# Patient Record
Sex: FEMALE | Race: WHITE | NOT HISPANIC OR LATINO | Employment: UNEMPLOYED | URBAN - METROPOLITAN AREA
[De-identification: names, ages, dates, MRNs, and addresses within clinical notes are randomized per-mention and may not be internally consistent; named-entity substitution may affect disease eponyms.]

---

## 2018-08-09 ENCOUNTER — OFFICE VISIT (OUTPATIENT)
Dept: INTERNAL MEDICINE | Facility: CLINIC | Age: 28
End: 2018-08-09
Payer: COMMERCIAL

## 2018-08-09 VITALS
BODY MASS INDEX: 47.09 KG/M2 | WEIGHT: 293 LBS | HEART RATE: 112 BPM | HEIGHT: 66 IN | SYSTOLIC BLOOD PRESSURE: 110 MMHG | TEMPERATURE: 98.8 F | DIASTOLIC BLOOD PRESSURE: 80 MMHG | RESPIRATION RATE: 16 BRPM

## 2018-08-09 DIAGNOSIS — Z12.4 CERVICAL CANCER SCREENING: ICD-10-CM

## 2018-08-09 DIAGNOSIS — N92.6 IRREGULAR PERIODS: ICD-10-CM

## 2018-08-09 DIAGNOSIS — Z11.3 SCREEN FOR STD (SEXUALLY TRANSMITTED DISEASE): ICD-10-CM

## 2018-08-09 DIAGNOSIS — E66.01 MORBID OBESITY (H): ICD-10-CM

## 2018-08-09 DIAGNOSIS — Z13.6 CARDIOVASCULAR SCREENING; LDL GOAL LESS THAN 160: ICD-10-CM

## 2018-08-09 DIAGNOSIS — Z00.00 ROUTINE GENERAL MEDICAL EXAMINATION AT A HEALTH CARE FACILITY: Primary | ICD-10-CM

## 2018-08-09 LAB — HCG SERPL QL: NEGATIVE

## 2018-08-09 PROCEDURE — 80053 COMPREHEN METABOLIC PANEL: CPT | Performed by: PHYSICIAN ASSISTANT

## 2018-08-09 PROCEDURE — 99213 OFFICE O/P EST LOW 20 MIN: CPT | Mod: 25 | Performed by: PHYSICIAN ASSISTANT

## 2018-08-09 PROCEDURE — 84439 ASSAY OF FREE THYROXINE: CPT | Performed by: PHYSICIAN ASSISTANT

## 2018-08-09 PROCEDURE — 84443 ASSAY THYROID STIM HORMONE: CPT | Performed by: PHYSICIAN ASSISTANT

## 2018-08-09 PROCEDURE — 87591 N.GONORRHOEAE DNA AMP PROB: CPT | Performed by: PHYSICIAN ASSISTANT

## 2018-08-09 PROCEDURE — 80061 LIPID PANEL: CPT | Performed by: PHYSICIAN ASSISTANT

## 2018-08-09 PROCEDURE — G0145 SCR C/V CYTO,THINLAYER,RESCR: HCPCS | Performed by: PHYSICIAN ASSISTANT

## 2018-08-09 PROCEDURE — 84703 CHORIONIC GONADOTROPIN ASSAY: CPT | Performed by: PHYSICIAN ASSISTANT

## 2018-08-09 PROCEDURE — 87491 CHLMYD TRACH DNA AMP PROBE: CPT | Performed by: PHYSICIAN ASSISTANT

## 2018-08-09 PROCEDURE — 99385 PREV VISIT NEW AGE 18-39: CPT | Performed by: PHYSICIAN ASSISTANT

## 2018-08-09 PROCEDURE — G0476 HPV COMBO ASSAY CA SCREEN: HCPCS | Performed by: PHYSICIAN ASSISTANT

## 2018-08-09 PROCEDURE — 36415 COLL VENOUS BLD VENIPUNCTURE: CPT | Performed by: PHYSICIAN ASSISTANT

## 2018-08-09 RX ORDER — NORETHINDRONE ACETATE AND ETHINYL ESTRADIOL .02; 1 MG/1; MG/1
1 TABLET ORAL DAILY
Qty: 28 TABLET | Refills: 11 | Status: SHIPPED | OUTPATIENT
Start: 2018-08-09 | End: 2018-10-31

## 2018-08-09 NOTE — PROGRESS NOTES
SUBJECTIVE:   CC: Siddharth Fontanez is an 28 year old woman who presents for preventive health visit.     Physical   Annual:     Getting at least 3 servings of Calcium per day:  Yes    Bi-annual eye exam:  NO    Dental care twice a year:  NO    Sleep apnea or symptoms of sleep apnea:  None    Diet:  Regular (no restrictions), Low salt and Low fat/cholesterol    Frequency of exercise:  2-3 days/week    Duration of exercise:  30-45 minutes    Taking medications regularly:  Yes    Medication side effects:  None    Additional concerns today:  No    Patient is here for a physical and fasting lab work today. She has a longstanding history of irregular periods and would like to ensure that she is not pregnant and to discuss possible birth control to regulate her periods. She would also like STD screening and other general fasting lab work today.    -------------------------------------    Today's PHQ-2 Score:   PHQ-2 ( 1999 Pf[employer]) 8/9/2018   Q1: Little interest or pleasure in doing things 1   Q2: Feeling down, depressed or hopeless 0   PHQ-2 Score 1   Q1: Little interest or pleasure in doing things Several days   Q2: Feeling down, depressed or hopeless Not at all   PHQ-2 Score 1       Abuse: Current or Past(Physical, Sexual or Emotional)- No  Do you feel safe in your environment - Yes    Social History   Substance Use Topics     Smoking status: Never Smoker     Smokeless tobacco: Never Used     Alcohol use No     Alcohol Use 8/9/2018   If you drink alcohol do you typically have greater than 3 drinks per day OR greater than 7 drinks per week? No       Reviewed orders with patient.  Reviewed health maintenance and updated orders accordingly - Yes  BP Readings from Last 3 Encounters:   08/09/18 110/80   06/18/14 100/80   02/11/14 100/80    Wt Readings from Last 3 Encounters:   08/09/18 313 lb (142 kg)   06/18/14 300 lb (136.1 kg)   02/11/14 300 lb (136.1 kg)         Mammogram not appropriate for this patient based on  "age.    Pertinent mammograms are reviewed under the imaging tab.  History of abnormal Pap smear: NO - age 30- 65 PAP every 3 years recommended  PAP / HPV 9/23/2013   PAP NIL     Reviewed and updated as needed this visit by clinical staff  Tobacco  Allergies         Reviewed and updated as needed this visit by Provider            Review of Systems  CONSTITUTIONAL: NEGATIVE for fever, chills, change in weight  INTEGUMENTARU/SKIN: NEGATIVE for worrisome rashes, moles or lesions  EYES: NEGATIVE for vision changes or irritation  ENT: NEGATIVE for ear, mouth and throat problems  RESP: NEGATIVE for significant cough or SOB  BREAST: NEGATIVE for masses, tenderness or discharge  CV: NEGATIVE for chest pain, palpitations or peripheral edema  GI: NEGATIVE for nausea, abdominal pain, heartburn, or change in bowel habits   female: irregular periods  MUSCULOSKELETAL: NEGATIVE for significant arthralgias or myalgia  NEURO: NEGATIVE for weakness, dizziness or paresthesias  ENDOCRINE: NEGATIVE for temperature intolerance, skin/hair changes  PSYCHIATRIC: NEGATIVE for changes in mood or affect     OBJECTIVE:   /80  Pulse 112  Temp 98.8  F (37.1  C) (Oral)  Resp 16  Ht 5' 5.75\" (1.67 m)  Wt 313 lb (142 kg)  LMP 07/26/2018  BMI 50.9 kg/m2  Physical Exam  GENERAL: alert and obese  EYES: Eyes grossly normal to inspection, PERRL and conjunctivae and sclerae normal  HENT: ear canals and TM's normal, nose and mouth without ulcers or lesions  NECK: no adenopathy, no asymmetry, masses, or scars and thyroid normal to palpation  RESP: lungs clear to auscultation - no rales, rhonchi or wheezes  CV: regular rate and rhythm, normal S1 S2, no S3 or S4, no murmur, click or rub, no peripheral edema and peripheral pulses strong  ABDOMEN: soft, nontender, no hepatosplenomegaly, no masses and bowel sounds normal   (female): normal female external genitalia, normal urethral meatus, vaginal mucosa pink, moist, well rugated, and normal " cervix/adnexa/uterus without masses or discharge  MS: no gross musculoskeletal defects noted, no edema  SKIN: no suspicious lesions or rashes  NEURO: Normal strength and tone, mentation intact and speech normal  PSYCH: mentation appears normal, affect normal/bright    Diagnostic Test Results:  No results found for this or any previous visit (from the past 24 hour(s)).  Patient will get fasting lab work as well as PAP and STD screening.    ASSESSMENT/PLAN:       ICD-10-CM    1. Routine general medical examination at a health care facility Z00.00 Lipid panel reflex to direct LDL Fasting     Comprehensive metabolic panel   2. Morbid obesity (H) E66.01 Lipid panel reflex to direct LDL Fasting     Comprehensive metabolic panel     TSH with free T4 reflex   3. CARDIOVASCULAR SCREENING; LDL GOAL LESS THAN 160 Z13.6 Lipid panel reflex to direct LDL Fasting   4. Irregular periods N92.6 HCG Qual, Blood ([id number])     TSH with free T4 reflex     norethindrone-ethinyl estradiol (MICROGESTIN 1/20) 1-20 MG-MCG per tablet   5. Cervical cancer screening Z12.4 Pap imaged thin layer screen with HPV - recommended age 30 - 65 years (select HPV order below)     HPV High Risk Types DNA Cervical   6. Screen for STD (sexually transmitted disease) Z11.3 NEISSERIA GONORRHOEA PCR     CHLAMYDIA TRACHOMATIS PCR     Patient does not have a PCP here at [facility] and reports that she is also frequently seen at [facility] but does not have a PCP there either.   I will get fasting blood work today and follow up with results. I discussed birth control and irregular periods with patient and will have her start an OCP and follow up if breakthrough bleeding or other concerns.   I discussed diet and exercise changes and she will be making lifestyle changes and was encouraged to follow up and establish care with a PCP in 3-6 months.       COUNSELING:  Reviewed preventive health counseling, as reflected in patient instructions       Regular exercise        "Healthy diet/nutrition       Contraception       Safe sex practices/STD prevention    BP Readings from Last 1 Encounters:   08/09/18 110/80     Estimated body mass index is 50.9 kg/(m^2) as calculated from the following:    Height as of this encounter: 5' 5.75\" (1.67 m).    Weight as of this encounter: 313 lb (142 kg).      Weight management plan: Discussed healthy diet and exercise guidelines and patient will follow up in 3 months in clinic to re-evaluate.     reports that she has never smoked. She has never used smokeless tobacco.      Counseling Resources:  ATP IV Guidelines  Pooled Cohorts Equation Calculator  Breast Cancer Risk Calculator  FRAX Risk Assessment  ICSI Preventive Guidelines  Dietary Guidelines for Americans, 2010  USDA's MyPlate  ASA Prophylaxis  Lung CA Screening    Grisel Palacios PA-C  [facility]  Answers for HPI/ROS submitted by the patient on 8/9/2018   PHQ-2 Score: 1    "

## 2018-08-09 NOTE — LETTER
August 10, 2018      Siddharth Fontanez  [address] [address]        Dear ,    We are writing to inform you of your test results.    Siddharth lab work has returned and shows normal thyroid function, cholesterol levels are at goal with exception of HDL(good cholesterol) which can be improved with increased cardiovascular exercise, Kidney function, liver function, blood sugar and electrolytes are within normal range with exception of a mildly low calcium level. STD screening tests are negative.    Resulted Orders   Lipid panel reflex to direct LDL Fasting   Result Value Ref Range    Cholesterol 140 <200 mg/dL    Triglycerides 78 <150 mg/dL    HDL Cholesterol 45 (L) >49 mg/dL    LDL Cholesterol Calculated 79 <100 mg/dL      Comment:      Desirable:       <100 mg/dl    Non HDL Cholesterol 95 <130 mg/dL   Comprehensive metabolic panel   Result Value Ref Range    Sodium 139 133 - 144 mmol/L    Potassium 3.9 3.4 - 5.3 mmol/L    Chloride 107 94 - 109 mmol/L    Carbon Dioxide 22 20 - 32 mmol/L    Anion Gap 10 3 - 14 mmol/L    Glucose 91 70 - 99 mg/dL    Urea Nitrogen 11 7 - 30 mg/dL    Creatinine 0.74 0.52 - 1.04 mg/dL    GFR Estimate >90 >60 mL/min/1.7m2      Comment:      Non  GFR Calc    GFR Estimate If Black >90 >60 mL/min/1.7m2      Comment:       GFR Calc    Calcium 7.9 (L) 8.5 - 10.1 mg/dL    Bilirubin Total 0.3 0.2 - 1.3 mg/dL    Albumin 3.2 (L) 3.4 - 5.0 g/dL    Protein Total 8.0 6.8 - 8.8 g/dL    Alkaline Phosphatase 116 40 - 150 U/L    ALT 15 0 - 50 U/L    AST 12 0 - 45 U/L   HCG Qual, Blood ([id number])   Result Value Ref Range    HCG Qualitative Serum Negative NEG^Negative      Comment:      This test is for screening purposes.  Results should be interpreted along with   the clinical picture.  Confirmation testing is available if warranted by   ordering [id number], HCG Quantitative Pregnancy.     TSH with free T4 reflex   Result Value Ref Range    TSH 4.24 (H) 0.40 -  4.00 mU/L   NEISSERIA GONORRHOEA PCR   Result Value Ref Range    Specimen Descrip Cervix     N Gonorrhea PCR Negative NEG^Negative      Comment:      Negative for N. gonorrhoeae rRNA by transcription mediated amplification.  A negative result by transcription mediated amplification does not preclude   the presence of N. gonorrhoeae infection because results are dependent on   proper and adequate collection, absence of inhibitors, and sufficient rRNA to   be detected.     CHLAMYDIA TRACHOMATIS PCR   Result Value Ref Range    Specimen Description Cervix     Chlamydia Trachomatis PCR Negative NEG^Negative      Comment:      Negative for C. trachomatis rRNA by transcription mediated amplification.  A negative result by transcription mediated amplification does not preclude   the presence of C. trachomatis infection because results are dependent on   proper and adequate collection, absence of inhibitors, and sufficient rRNA to   be detected.     T4 free   Result Value Ref Range    T4 Free 1.01 0.76 - 1.46 ng/dL       If you have any questions or concerns, please call the clinic at the number listed above.       Sincerely,        Grisel Palacios PA-C

## 2018-08-09 NOTE — LETTER
August 16, 2018    Siddharth Fontanez  [address] [facility] [address]    Dear Siddharth,  We are happy to inform you that your PAP smear result from 08/09/18 is normal.  We are now able to do a follow up test on PAP smears. The DNA test is for HPV (Human Papilloma Virus). Cervical cancer is closely linked with certain types of HPV. Your results showed no evidence of high risk HPV.  Therefore we recommend you return in 3 years for your next pap smear.  You will still need to return to the clinic every year for an annual exam and other preventive tests.  Please contact the clinic at [phone] with any questions.  Sincerely,    Grisel Palacios PA-C/latisha

## 2018-08-09 NOTE — MR AVS SNAPSHOT
After Visit Summary   8/9/2018    Siddharth Fontanez    MRN: [id number]           Patient Information     Date Of Birth          1990        Visit Information        Provider Department      8/9/2018 12:00 PM Grisel Palacios PA-C [facility]        Today's Diagnoses     Routine general medical examination at a health care facility    -  1    Morbid obesity (H)        CARDIOVASCULAR SCREENING; LDL GOAL LESS THAN 160        Irregular periods        Cervical cancer screening        Screen for STD (sexually transmitted disease)          Care Instructions      Preventive Health Recommendations  Female Ages 26 - 39  Yearly exam:   See your health care provider every year in order to    Review health changes.     Discuss preventive care.      Review your medicines if you your doctor has prescribed any.    Until age 30: Get a Pap test every three years (more often if you have had an abnormal result).    After age 30: Talk to your doctor about whether you should have a Pap test every 3 years or have a Pap test with HPV screening every 5 years.   You do not need a Pap test if your uterus was removed (hysterectomy) and you have not had cancer.  You should be tested each year for STDs (sexually transmitted diseases), if you're at risk.   Talk to your provider about how often to have your cholesterol checked.  If you are at risk for diabetes, you should have a diabetes test (fasting glucose).  Shots: Get a flu shot each year. Get a tetanus shot every 10 years.   Nutrition:     Eat at least 5 servings of fruits and vegetables each day.    Eat whole-grain bread, whole-wheat pasta and brown rice instead of white grains and rice.    Get adequate Calcium and Vitamin D.     Lifestyle    Exercise at least 150 minutes a week (30 minutes a day, 5 days of the week). This will help you control your weight and prevent disease.    Limit alcohol to one drink per day.    No smoking.     Wear sunscreen to prevent  "skin cancer.    See your dentist every six months for an exam and cleaning.            Follow-ups after your visit        Your next 10 appointments already scheduled     Aug 09, 2018 12:00 PM CDT   PHYSICAL with Grisel Palacios PA-C   [facility]    [address]  [facility] [id number] [phone]              Who to contact     If you have questions or need follow up information about today's clinic visit or your schedule please contact [facility] directly at [phone].  Normal or non-critical lab and imaging results will be communicated to you by woohoo mobile marketinghart, letter or phone within 4 business days after the clinic has received the results. If you do not hear from us within 7 days, please contact the clinic through woohoo mobile marketinghart or phone. If you have a critical or abnormal lab result, we will notify you by phone as soon as possible.  Submit refill requests through Solavei or call your pharmacy and they will forward the refill request to us. Please allow 3 business days for your refill to be completed.          Additional Information About Your Visit        MyChart Information     [employer]i lets you send messages to your doctor, view your test results, renew your prescriptions, schedule appointments and more. To sign up, go to [email or web address] . Click on \"Log in\" on the left side of the screen, which will take you to the Welcome page. Then click on \"Sign up Now\" on the right side of the page.     You will be asked to enter the access code listed below, as well as some personal information. Please follow the directions to create your username and password.     Your access code is: [email or web address]  Expires: 2018 11:55 AM     Your access code will  in 90 days. If you need help or a new code, please call your [facility] or [phone].        Care EveryWhere ID     This is your Care EveryWhere ID. This could be used by other " "organizations to access your [place] medical records  [id number]        Your Vitals Were     Pulse Temperature Respirations Height Last Period BMI (Body Mass Index)    112 98.8  F (37.1  C) (Oral) 16 5' 5.75\" (1.67 m) 07/26/2018 50.9 kg/m2       Blood Pressure from Last 3 Encounters:   08/09/18 110/80   06/18/14 100/80   02/11/14 100/80    Weight from Last 3 Encounters:   08/09/18 313 lb (142 kg)   06/18/14 300 lb (136.1 kg)   02/11/14 300 lb (136.1 kg)              We Performed the Following     CHLAMYDIA TRACHOMATIS PCR     Comprehensive metabolic panel     HCG Qual, Blood ([id number])     HPV High Risk Types DNA Cervical     Lipid panel reflex to direct LDL Fasting     NEISSERIA GONORRHOEA PCR     Pap imaged thin layer screen with HPV - recommended age 30 - 65 years (select HPV order below)     TSH with free T4 reflex          Today's Medication Changes          These changes are accurate as of 8/9/18 11:55 AM.  If you have any questions, ask your nurse or doctor.               Start taking these medicines.        Dose/Directions    norethindrone-ethinyl estradiol 1-20 MG-MCG per tablet   Commonly known as:  MICROGESTIN 1/20   Used for:  Irregular periods   Started by:  Grisel Palacios PA-C        Dose:  1 tablet   Take 1 tablet by mouth daily   Quantity:  28 tablet   Refills:  11         Stop taking these medicines if you haven't already. Please contact your care team if you have questions.     cyclobenzaprine 10 MG tablet   Commonly known as:  FLEXERIL   Stopped by:  Grisel Palacios PA-C           naproxen 500 MG tablet   Commonly known as:  NAPROSYN   Stopped by:  Grisel Palacios PA-C           order for DME   Stopped by:  Grisel Palacios PA-C                Where to get your medicines      These medications were sent to [facility] [address] [facility] [address] [facility] [address]  [address], [facility] [address]     Phone:  [phone]     " norethindrone-ethinyl estradiol 1-20 MG-MCG per tablet                Primary Care Provider Fax #    Physician No Ref-Primary [phone]       No address on file        Equal Access to Services     SHIRLEY HARTLEY : Hadii aad ku hadprimo Hagan, spencer gadarren, vitaliy kaearlene sepulveda, jae torres laGloriamatt hunter. So [facility] [phone].    ATENCIÓN: Si habla español, tiene a lowry disposición servicios gratuitos de asistencia lingüística. Llame al [phone].    We comply with applicable federal civil rights laws and [place] laws. We do not discriminate on the basis of race, color, national origin, age, disability, sex, sexual orientation, or gender identity.            Thank you!     Thank you for choosing [facility]  for your care. Our goal is always to provide you with excellent care. Hearing back from our patients is one way we can continue to improve our services. Please take a few minutes to complete the written survey that you may receive in the mail after your visit with us. Thank you!             Your Updated Medication List - Protect others around you: Learn how to safely use, store and throw away your medicines at www.disposemymeds.org.          This list is accurate as of 8/9/18 11:55 AM.  Always use your most recent med list.                   Brand Name Dispense Instructions for use Diagnosis    norethindrone-ethinyl estradiol 1-20 MG-MCG per tablet    MICROGESTIN 1/20    28 tablet    Take 1 tablet by mouth daily    Irregular periods

## 2018-08-09 NOTE — LETTER
[facility]  [address], [address]  [place] [address]  [phone]        August 10, 2018    Siddharth Fontanez  [address] [facility] [address]            Dear Ms. Siddharth Fontanez:      The results of your recent pregnancy tests were negative.  If you have any further questions or problems, please contact our office.    Sincerely,        Grisel Palacios NP

## 2018-08-10 LAB
ALBUMIN SERPL-MCNC: 3.2 G/DL (ref 3.4–5)
ALP SERPL-CCNC: 116 U/L (ref 40–150)
ALT SERPL W P-5'-P-CCNC: 15 U/L (ref 0–50)
ANION GAP SERPL CALCULATED.3IONS-SCNC: 10 MMOL/L (ref 3–14)
AST SERPL W P-5'-P-CCNC: 12 U/L (ref 0–45)
BILIRUB SERPL-MCNC: 0.3 MG/DL (ref 0.2–1.3)
BUN SERPL-MCNC: 11 MG/DL (ref 7–30)
C TRACH DNA SPEC QL NAA+PROBE: NEGATIVE
CALCIUM SERPL-MCNC: 7.9 MG/DL (ref 8.5–10.1)
CHLORIDE SERPL-SCNC: 107 MMOL/L (ref 94–109)
CHOLEST SERPL-MCNC: 140 MG/DL
CO2 SERPL-SCNC: 22 MMOL/L (ref 20–32)
CREAT SERPL-MCNC: 0.74 MG/DL (ref 0.52–1.04)
GFR SERPL CREATININE-BSD FRML MDRD: >90 ML/MIN/1.7M2
GLUCOSE SERPL-MCNC: 91 MG/DL (ref 70–99)
HDLC SERPL-MCNC: 45 MG/DL
LDLC SERPL CALC-MCNC: 79 MG/DL
N GONORRHOEA DNA SPEC QL NAA+PROBE: NEGATIVE
NONHDLC SERPL-MCNC: 95 MG/DL
POTASSIUM SERPL-SCNC: 3.9 MMOL/L (ref 3.4–5.3)
PROT SERPL-MCNC: 8 G/DL (ref 6.8–8.8)
SODIUM SERPL-SCNC: 139 MMOL/L (ref 133–144)
SPECIMEN SOURCE: NORMAL
SPECIMEN SOURCE: NORMAL
T4 FREE SERPL-MCNC: 1.01 NG/DL (ref 0.76–1.46)
TRIGL SERPL-MCNC: 78 MG/DL
TSH SERPL DL<=0.005 MIU/L-ACNC: 4.24 MU/L (ref 0.4–4)

## 2018-08-13 LAB
COPATH REPORT: NORMAL
PAP: NORMAL

## 2018-08-15 LAB
FINAL DIAGNOSIS: NORMAL
HPV HR 12 DNA CVX QL NAA+PROBE: NEGATIVE
HPV16 DNA SPEC QL NAA+PROBE: NEGATIVE
HPV18 DNA SPEC QL NAA+PROBE: NEGATIVE
SPECIMEN DESCRIPTION: NORMAL
SPECIMEN SOURCE CVX/VAG CYTO: NORMAL

## 2018-10-31 ENCOUNTER — HOSPITAL ENCOUNTER (EMERGENCY)
Facility: CLINIC | Age: 28
Discharge: HOME | End: 2018-10-31
Attending: EMERGENCY MEDICINE | Admitting: EMERGENCY MEDICINE
Payer: COMMERCIAL

## 2018-10-31 ENCOUNTER — APPOINTMENT (OUTPATIENT)
Dept: CT IMAGING | Facility: CLINIC | Age: 28
End: 2018-10-31
Attending: EMERGENCY MEDICINE
Payer: COMMERCIAL

## 2018-10-31 ENCOUNTER — MEDICAL CORRESPONDENCE (OUTPATIENT)
Dept: HEALTH INFORMATION MANAGEMENT | Facility: CLINIC | Age: 28
End: 2018-10-31

## 2018-10-31 VITALS
RESPIRATION RATE: 23 BRPM | DIASTOLIC BLOOD PRESSURE: 84 MMHG | OXYGEN SATURATION: 96 % | TEMPERATURE: 97 F | SYSTOLIC BLOOD PRESSURE: 100 MMHG | HEART RATE: 61 BPM | WEIGHT: 293 LBS | BODY MASS INDEX: 52.35 KG/M2

## 2018-10-31 DIAGNOSIS — R51.9 ACUTE NONINTRACTABLE HEADACHE, UNSPECIFIED HEADACHE TYPE: ICD-10-CM

## 2018-10-31 LAB
ANION GAP SERPL CALCULATED.3IONS-SCNC: 3 MMOL/L (ref 3–14)
B-HCG FREE SERPL-ACNC: <5 IU/L
BASOPHILS # BLD AUTO: 0 10E9/L (ref 0–0.2)
BASOPHILS NFR BLD AUTO: 0.2 %
BUN SERPL-MCNC: 11 MG/DL (ref 7–30)
CALCIUM SERPL-MCNC: 8 MG/DL (ref 8.5–10.1)
CHLORIDE SERPL-SCNC: 106 MMOL/L (ref 94–109)
CO2 SERPL-SCNC: 29 MMOL/L (ref 20–32)
CREAT SERPL-MCNC: 0.84 MG/DL (ref 0.52–1.04)
DIFFERENTIAL METHOD BLD: ABNORMAL
EOSINOPHIL # BLD AUTO: 0.2 10E9/L (ref 0–0.7)
EOSINOPHIL NFR BLD AUTO: 1.9 %
ERYTHROCYTE [DISTWIDTH] IN BLOOD BY AUTOMATED COUNT: 14.3 % (ref 10–15)
GFR SERPL CREATININE-BSD FRML MDRD: 79 ML/MIN/1.7M2
GLUCOSE BLDC GLUCOMTR-MCNC: 93 MG/DL (ref 70–99)
GLUCOSE SERPL-MCNC: 104 MG/DL (ref 70–99)
HCT VFR BLD AUTO: 40.5 % (ref 35–47)
HGB BLD-MCNC: 12.6 G/DL (ref 11.7–15.7)
IMM GRANULOCYTES # BLD: 0.1 10E9/L (ref 0–0.4)
IMM GRANULOCYTES NFR BLD: 0.4 %
LYMPHOCYTES # BLD AUTO: 2.6 10E9/L (ref 0.8–5.3)
LYMPHOCYTES NFR BLD AUTO: 20.1 %
MCH RBC QN AUTO: 27.8 PG (ref 26.5–33)
MCHC RBC AUTO-ENTMCNC: 31.1 G/DL (ref 31.5–36.5)
MCV RBC AUTO: 89 FL (ref 78–100)
MONOCYTES # BLD AUTO: 0.5 10E9/L (ref 0–1.3)
MONOCYTES NFR BLD AUTO: 3.8 %
NEUTROPHILS # BLD AUTO: 9.5 10E9/L (ref 1.6–8.3)
NEUTROPHILS NFR BLD AUTO: 73.6 %
NRBC # BLD AUTO: 0 10*3/UL
NRBC BLD AUTO-RTO: 0 /100
PLATELET # BLD AUTO: 375 10E9/L (ref 150–450)
POTASSIUM SERPL-SCNC: 4.1 MMOL/L (ref 3.4–5.3)
RBC # BLD AUTO: 4.53 10E12/L (ref 3.8–5.2)
SODIUM SERPL-SCNC: 138 MMOL/L (ref 133–144)
TROPONIN I SERPL-MCNC: <0.015 UG/L (ref 0–0.04)
TSH SERPL DL<=0.005 MIU/L-ACNC: 2.44 MU/L (ref 0.4–4)
WBC # BLD AUTO: 12.9 10E9/L (ref 4–11)

## 2018-10-31 PROCEDURE — 70450 CT HEAD/BRAIN W/O DYE: CPT

## 2018-10-31 PROCEDURE — 84443 ASSAY THYROID STIM HORMONE: CPT | Performed by: EMERGENCY MEDICINE

## 2018-10-31 PROCEDURE — 84484 ASSAY OF TROPONIN QUANT: CPT | Performed by: EMERGENCY MEDICINE

## 2018-10-31 PROCEDURE — 99284 EMERGENCY DEPT VISIT MOD MDM: CPT | Mod: 25

## 2018-10-31 PROCEDURE — 96361 HYDRATE IV INFUSION ADD-ON: CPT

## 2018-10-31 PROCEDURE — 85025 COMPLETE CBC W/AUTO DIFF WBC: CPT | Performed by: EMERGENCY MEDICINE

## 2018-10-31 PROCEDURE — 96374 THER/PROPH/DIAG INJ IV PUSH: CPT

## 2018-10-31 PROCEDURE — 96375 TX/PRO/DX INJ NEW DRUG ADDON: CPT

## 2018-10-31 PROCEDURE — 84702 CHORIONIC GONADOTROPIN TEST: CPT

## 2018-10-31 PROCEDURE — 93005 ELECTROCARDIOGRAM TRACING: CPT

## 2018-10-31 PROCEDURE — 25000128 H RX IP 250 OP 636: Performed by: EMERGENCY MEDICINE

## 2018-10-31 PROCEDURE — 36415 COLL VENOUS BLD VENIPUNCTURE: CPT | Performed by: EMERGENCY MEDICINE

## 2018-10-31 PROCEDURE — 80048 BASIC METABOLIC PNL TOTAL CA: CPT | Performed by: EMERGENCY MEDICINE

## 2018-10-31 PROCEDURE — 00000146 ZZHCL STATISTIC GLUCOSE BY METER IP

## 2018-10-31 RX ORDER — DIPHENHYDRAMINE HYDROCHLORIDE 50 MG/ML
25 INJECTION INTRAMUSCULAR; INTRAVENOUS ONCE
Status: COMPLETED | OUTPATIENT
Start: 2018-10-31 | End: 2018-10-31

## 2018-10-31 RX ORDER — KETOROLAC TROMETHAMINE 15 MG/ML
15 INJECTION, SOLUTION INTRAMUSCULAR; INTRAVENOUS ONCE
Status: COMPLETED | OUTPATIENT
Start: 2018-10-31 | End: 2018-10-31

## 2018-10-31 RX ORDER — METOCLOPRAMIDE HYDROCHLORIDE 5 MG/ML
10 INJECTION INTRAMUSCULAR; INTRAVENOUS ONCE
Status: COMPLETED | OUTPATIENT
Start: 2018-10-31 | End: 2018-10-31

## 2018-10-31 RX ADMIN — METOCLOPRAMIDE 10 MG: 5 INJECTION, SOLUTION INTRAMUSCULAR; INTRAVENOUS at 19:54

## 2018-10-31 RX ADMIN — SODIUM CHLORIDE 1000 ML: 9 INJECTION, SOLUTION INTRAVENOUS at 19:52

## 2018-10-31 RX ADMIN — KETOROLAC TROMETHAMINE 15 MG: 15 INJECTION, SOLUTION INTRAMUSCULAR; INTRAVENOUS at 22:14

## 2018-10-31 RX ADMIN — DIPHENHYDRAMINE HYDROCHLORIDE 25 MG: 50 INJECTION, SOLUTION INTRAMUSCULAR; INTRAVENOUS at 19:53

## 2018-10-31 ASSESSMENT — ENCOUNTER SYMPTOMS
VOMITING: 0
FEVER: 0
DIZZINESS: 1
TREMORS: 0
NAUSEA: 0
NECK PAIN: 1
HEADACHES: 1

## 2018-10-31 NOTE — ED AVS SNAPSHOT
[facility] Emergency Department    [address]    [facility] [address]    Phone:  [phone]    Fax:  [phone]                                       Siddharth Fontanez   MRN: [id number]    Department:  [facility] Emergency Department   Date of Visit:  10/31/2018           Patient Information     Date Of Birth          1990        Your diagnoses for this visit were:     Acute nonintractable headache, unspecified headache type        You were seen by Saulo Sims MD and Trey Romano MD.      Follow-up Information     Schedule an appointment as soon as possible for a visit with [facility].    Contact information:    [address]  [facility] [address] [phone]        Follow up with [facility] Emergency Department.    Specialty:  EMERGENCY MEDICINE    Why:  As needed, If symptoms worsen    Contact information:    [address]  [facility] [address] [phone]        Schedule an appointment as soon as possible for a visit with primary care provider .    Why:  for emergency department follow-up         Discharge Instructions       Discharge Instructions  Headache    You were seen today for a headache. Headaches may be caused by many different things such as muscle tension, sinus inflammation, anxiety and stress, having too little sleep, too much alcohol, some medical conditions or injury. You may have a migraine, which is caused by changes in the blood vessels in your head.  At this time your provider does not find that your headache is a sign of anything dangerous or life-threatening.  However, sometimes the signs of serious illness do not show up right away.      Generally, every Emergency Department visit should have a follow-up clinic visit with either a primary or a specialty clinic/provider. Please follow-up as instructed by your emergency provider today.    Return to the  Emergency Department if:    You get a new fever of 100.4 F or higher.    Your headache gets much worse.    You get a stiff neck with your headache.    You get a new headache that is significantly different or worse than headaches you have had before.    You are vomiting (throwing up) and cannot keep food or water down.    You have blurry or double vision or other problems with your eyes.    You have a new weakness on one side of your body.    You have difficulty with balance which is new.    You or your family thinks you are confused.    You have a seizure.    What can I do to help myself?    Pain medications - You may take a pain medication such as Tylenol  (acetaminophen), Advil , Motrin  (ibuprofen) or Aleve  (naproxen).    Take a pain reliever as soon as you notice symptoms.  Starting medications as soon as you start to have symptoms may lessen the amount of pain you have.    Relaxing in a quiet, dark room may help.    Get enough sleep and eat meals regularly.    You may need to watch for certain foods or other things which may trigger your headaches.  Keeping a journal of your headaches and possible triggers may help you and your primary provider to identify things which you should avoid which may be causing your headaches.  If you were given a prescription for medicine here today, be sure to read all of the information (including the package insert) that comes with your prescription.  This will include important information about the medicine, its side effects, and any warnings that you need to know about.  The pharmacist who fills the prescription can provide more information and answer questions you may have about the medicine.  If you have questions or concerns that the pharmacist cannot address, please call or return to the Emergency Department.   Remember that you can always come back to the Emergency Department if you are not able to see your regular provider in the amount of time listed above, if you  get any new symptoms, or if there is anything that worries you.      24 Hour Appointment Hotline       To make an appointment at any [facility], call [phone] ([phone]). If you don't have a family doctor or clinic, we will help you find one. [facility] are conveniently located to serve the needs of you and your family.             Review of your medicines      Notice     You have not been prescribed any medications.            Procedures and tests performed during your visit     Basic metabolic panel    CBC with platelets differential    EKG 12 lead    Glucose by meter    Head CT w/o contrast    ISTAT HCG Quantitative Pregnancy Nursing POCT    ISTAT HCG Quantitative Pregnancy POCT    Peripheral IV catheter    TSH with free T4 reflex    Troponin I      Orders Needing Specimen Collection     None      Pending Results     Date and Time Order Name Status Description    10/31/2018 1947 EKG 12 lead Preliminary             Pending Culture Results     No orders found from 10/29/2018 to 11/1/2018.            Pending Results Instructions     If you had any lab results that were not finalized at the time of your Discharge, you can call the ED Lab Result RN at [phone]. You will be contacted by this team for any positive Lab results or changes in treatment. The nurses are available 7 days a week from 10A to 6:30P.  You can leave a message 24 hours per day and they will return your call.        Test Results From Your Hospital Stay        10/31/2018  7:43 PM      Component Results     Component Value Ref Range & Units Status    Glucose 93 70 - 99 mg/dL Final         10/31/2018  9:23 PM      Narrative     CT SCAN OF THE HEAD WITHOUT CONTRAST   10/31/2018 9:16 PM     HISTORY: Severe headache.    TECHNIQUE:  Axial images of the head and coronal reformations without  IV contrast material. Radiation dose for this scan was reduced using  automated exposure control, adjustment of the mA and/or kV  according  to patient size, or iterative reconstruction technique.    COMPARISON: None.    FINDINGS:  The ventricles are normal in size, shape and configuration.   The brain parenchyma and subarachnoid spaces are normal. There is no  evidence of intracranial hemorrhage, mass, acute infarct or anomaly.     The visualized portions of the sinuses and mastoids appear normal.  There is no evidence of trauma.        Impression     IMPRESSION: Normal CT scan of the head.      NU MATHUR MD         10/31/2018  9:02 PM      Component Results     Component Value Ref Range & Units Status    Sodium 138 133 - 144 mmol/L Final    Potassium 4.1 3.4 - 5.3 mmol/L Final    Chloride 106 94 - 109 mmol/L Final    Carbon Dioxide 29 20 - 32 mmol/L Final    Anion Gap 3 3 - 14 mmol/L Final    Glucose 104 (H) 70 - 99 mg/dL Final    Urea Nitrogen 11 7 - 30 mg/dL Final    Creatinine 0.84 0.52 - 1.04 mg/dL Final    GFR Estimate 79 >60 mL/min/1.7m2 Final    Non  GFR Calc    GFR Estimate If Black >90 >60 mL/min/1.7m2 Final    African American GFR Calc    Calcium 8.0 (L) 8.5 - 10.1 mg/dL Final         10/31/2018  8:31 PM      Component Results     Component Value Ref Range & Units Status    WBC 12.9 (H) 4.0 - 11.0 10e9/L Final    RBC Count 4.53 3.8 - 5.2 10e12/L Final    Hemoglobin 12.6 11.7 - 15.7 g/dL Final    Hematocrit 40.5 35.0 - 47.0 % Final    MCV 89 78 - 100 fl Final    MCH 27.8 26.5 - 33.0 pg Final    MCHC 31.1 (L) 31.5 - 36.5 g/dL Final    RDW 14.3 10.0 - 15.0 % Final    Platelet Count 375 150 - 450 10e9/L Final    Diff Method Automated Method  Final    % Neutrophils 73.6 % Final    % Lymphocytes 20.1 % Final    % Monocytes 3.8 % Final    % Eosinophils 1.9 % Final    % Basophils 0.2 % Final    % Immature Granulocytes 0.4 % Final    Nucleated RBCs 0 0 /100 Final    Absolute Neutrophil 9.5 (H) 1.6 - 8.3 10e9/L Final    Absolute Lymphocytes 2.6 0.8 - 5.3 10e9/L Final    Absolute Monocytes 0.5 0.0 - 1.3 10e9/L Final     Absolute Eosinophils 0.2 0.0 - 0.7 10e9/L Final    Absolute Basophils 0.0 0.0 - 0.2 10e9/L Final    Abs Immature Granulocytes 0.1 0 - 0.4 10e9/L Final    Absolute Nucleated RBC 0.0  Final         10/31/2018  9:02 PM      Component Results     Component Value Ref Range & Units Status    Troponin I ES <0.015 0.000 - 0.045 ug/L Final    The 99th percentile for upper reference range is 0.045 ug/L.  Troponin values   in the range of 0.045 - 0.120 ug/L may be associated with risks of adverse   clinical events.           10/31/2018  9:05 PM      Component Results     Component Value Ref Range & Units Status    TSH 2.44 0.40 - 4.00 mU/L Final         10/31/2018  8:34 PM      Component Results     Component Value Ref Range & Units Status    HCG Quantitative Serum <5.0 <5.0 IU/L Final                Clinical Quality Measure: Blood Pressure Screening     Your blood pressure was checked while you were in the emergency department today. The last reading we obtained was  BP: 126/78 . Please read the guidelines below about what these numbers mean and what you should do about them.  If your systolic blood pressure (the top number) is less than 120 and your diastolic blood pressure (the bottom number) is less than 80, then your blood pressure is normal. There is nothing more that you need to do about it.  If your systolic blood pressure (the top number) is 120-139 or your diastolic blood pressure (the bottom number) is 80-89, your blood pressure may be higher than it should be. You should have your blood pressure rechecked within a year by a primary care provider.  If your systolic blood pressure (the top number) is 140 or greater or your diastolic blood pressure (the bottom number) is 90 or greater, you may have high blood pressure. High blood pressure is treatable, but if left untreated over time it can put you at risk for heart attack, stroke, or kidney failure. You should have your blood pressure rechecked by a primary care  "provider within the next 4 weeks.  If your provider in the emergency department today gave you specific instructions to follow-up with your doctor or provider even sooner than that, you should follow that instruction and not wait for up to 4 weeks for your follow-up visit.        Thank you for choosing [facility]       Thank you for choosing [facility] for your care. Our goal is always to provide you with excellent care. Hearing back from our patients is one way we can continue to improve our services. Please take a few minutes to complete the written survey that you may receive in the mail after you visit with us. Thank you!        [facility] Information     [facility] lets you send messages to your doctor, view your test results, renew your prescriptions, schedule appointments and more. To sign up, go to [email or web address] . Click on \"Log in\" on the left side of the screen, which will take you to the Welcome page. Then click on \"Sign up Now\" on the right side of the page.     You will be asked to enter the access code listed below, as well as some personal information. Please follow the directions to create your username and password.     Your access code is: [email or web address]  Expires: 2018 11:55 AM     Your access code will  in 90 days. If you need help or a new code, please call your [facility] clinic or [phone].        Care EveryWhere ID     This is your Care EveryWhere ID. This could be used by other organizations to access your [facility] medical records  [id number]        Equal Access to Services     SHIRLEY HARTLEY : Hadii pranav mullins Somonika, waaxda ludarren, qaybta kaalmajae preciado . So [facility] [phone].    ATENCIÓN: Si habla español, tiene a lowry disposición servicios gratuitos de asistencia lingüística. Llame al [phone].    We comply with applicable federal civil rights laws and [place] laws. We do not discriminate on the basis of race, color, " national origin, age, disability, sex, sexual orientation, or gender identity.            After Visit Summary       This is your record. Keep this with you and show to your community pharmacist(s) and doctor(s) at your next visit.

## 2018-10-31 NOTE — ED AVS SNAPSHOT
[facility] Emergency Department    [address]    [facility] [address]    Phone:  [phone]    Fax:  [phone]                                       Siddharth Fontanez   MRN: [id number]    Department:  [facility] Emergency Department   Date of Visit:  10/31/2018           After Visit Summary Signature Page     I have received my discharge instructions, and my questions have been answered. I have discussed any challenges I see with this plan with the nurse or doctor.    ..........................................................................................................................................  Patient/Patient Representative Signature      ..........................................................................................................................................  Patient Representative Print Name and Relationship to Patient    ..................................................               ................................................  Date                                   Time    ..........................................................................................................................................  Reviewed by Signature/Title    ...................................................              ..............................................  Date                                               Time          22EPIC Rev 08/18

## 2018-11-01 LAB — INTERPRETATION ECG - MUSE: NORMAL

## 2018-11-01 NOTE — ED NOTES
MD called to room after pt had spike in pt's pain and pt then went unresponsive with eyes fluttering. And heart rate spiked to the 150's.

## 2018-11-01 NOTE — ED PROVIDER NOTES
"  History     Chief Complaint:  Headache     HPI   Siddharth Fontanez is a 28 year old female who presents with her friend to the ED for evaluation of headache. Per nurse's note, the patient was found clutching her head while sitting in the triage room chair. Her muscles were tensed, and she did not respond to touch or voice. Her eyes were rolled back, but no tremors. This lasted approximately 30-45 seconds. The patient was awake immediately afterwards but stated she did not recall the event. Upon evaluation, the patient reports she developed a headache a week ago. She has severe shooting pain that radiates down into her neck. She has lost consciousness x4 within the past 2 days. She has an increase in intensity of pain before she faints. She also has had \"sleep paralysis and panic attacks.\" The patient notes she currently as well feels dizzy. She does have a history of headaches. The patient denies any head trauma, injuries, fever, nausea, or vomiting.      Allergies:  No known drug allergies    Medications:    The patient is not currently taking any prescribed medications.    Past Medical History:    The patient does not have any past pertinent medical history.    Past Surgical History:    History reviewed. No pertinent surgical history.    Family History:    Diabetes  HTN    Social History:  Smoking status: Never smoker    Alcohol use: No  Presents to ED with friend    Marital Status:  Single [1]     Review of Systems   Constitutional: Negative for fever.   Gastrointestinal: Negative for nausea and vomiting.   Musculoskeletal: Positive for neck pain.   Neurological: Positive for dizziness, syncope and headaches. Negative for tremors.   All other systems reviewed and are negative.    Physical Exam     Patient Vitals for the past 24 hrs:   BP Temp Temp src Pulse Heart Rate Resp SpO2 Weight   10/31/18 2230 100/84 - - - - - - -   10/31/18 2225 - - - - - - 96 % -   10/31/18 2200 95/54 - - - - - 99 % -   10/31/18 2147 99/55 - " - - - - 100 % -   10/31/18 2124 98/58 - - - - - 100 % -   10/31/18 2123 - - - - - - 100 % -   10/31/18 2000 108/79 - - - 87 23 99 % -   10/31/18 1953 105/71 - - - 82 22 100 % -   10/31/18 1946 121/83 - - - 90 17 (!) 86 % -   10/31/18 1921 - - - - - - - 146 kg (321 lb 14 oz)   10/31/18 1920 126/78 97  F (36.1  C) Temporal 61 61 18 94 % -     Physical Exam  Nursing note and vitals reviewed.  Constitutional: When I presented into room, patient turned her head and looked at me, however initially did not to respond to any questions.  Occasionally through her head back like she was in pain.  HENT:   Mouth/Throat: Moist mucous membranes.   Eyes: EOMI, nonicteric sclera  Cardiovascular: Tachycardic initially, but rapidly improved to a normal heart rate, regular rhythm, no murmurs, rubs, or gallops  Pulmonary/Chest: Effort normal and breath sounds normal. No respiratory distress. No wheezes. No rales.   Abdominal: Soft. Nontender, nondistended, no guarding or rigidity. BS present.   Musculoskeletal: Normal range of motion.   Neurological: Alert. Moves all extremities spontaneously.     CN's II-XII intact. 5/5 BUE and BLE strength. PERRL    EOMI without nystagmus.      Sensation intact to light touch. Negative pronator drift.    Finger to nose intact.   Skin: Skin is warm and dry. No rash noted.   Psychiatric: Normal mood and affect.       Emergency Department Course     ECG (19:30:00):  Rate 98 bpm. VA interval 166. QRS duration 74. QT/QTc 338/431. P-R-T axes 56 40 39. Normal sinus rhythm. Normal ECG. Interpreted at 1934 by Trey Romano MD.    Imaging:  Radiographic findings were communicated with the patient and family who voiced understanding of the findings.    Head CT w/o Contrast  IMPRESSION: Normal CT scan of the head. Imaging independently reviewed and agree with radiologist interpretation.     Laboratory:  Glucose meter (1931): 93    ISTAT HCG Pregnancy POCT (2021): <5.0    Troponin I (2022): <0.015  TSH:  "2.44    CBC: WBC 12.9(H), o/w WNL (HGB 12.6, )  BMP: Glucose 104(H), Calcium 8.0(L), o/w WNL (Creatinine 0.84)     Interventions:  1952: NS 1L Bolus IV  1953: Benadryl 25mg IV  1954: Reglan 10mg IV  2214: Toradol 15mg IV    Emergency Department Course:  Past medical records, nursing notes, and vitals reviewed.  1941: I performed an exam of the patient and obtained history, as documented above.    IV inserted and blood drawn.    The patient was sent for a head CT while in the emergency department, findings above.    2236: I rechecked the patient. Explained findings to patient and friend.    Findings and plan explained to the Patient and friend. Patient discharged home with instructions regarding supportive care, medications, and reasons to return. The importance of close follow-up was reviewed.     Impression & Plan      Medical Decision Making:  Patient presents with multiple complaints, mostly resolving around worsening headaches in the last week.  I was called into exam room when patient all of a sudden screamed out and stopped responding to nursing.  On my arrival, patient was tachycardic, but she turned her head to look at me when I introduced myself and asked questions.  Within 30 seconds to 1 minute she gave a full history of her reason for presentation and I performed a normal neurologic exam.  Patient asked at this time if I thought she had a \"tonic-clonic seizure,\" which is stated was unlikely given her normal mental status and how she never lost consciousness.  Patient reports the episode that she is having at home are very similar to what she had here throwing into doubt whether or not she is truly having syncopal episodes head CT was performed given her complaints of new headaches and this was fortunately negative.  Given normal neurologic exam, I do not believe emergent MRI is indicated tonight.  Remainder of patient's labs are normal.  She was symptomatically improved after a headache/migraine " cocktail.  Overall, uncertain of the etiology of patient's complaints, however pseudoseizure is possible.  Seizure, migraine, bleed, other etiologies are also on differential.  I explained to patient that she needs to follow-up with neurology and they may perform further testing to get to the bottom of these events.  Referral filled out and faxed.  I do not believe any antiepileptics are indicated at this time, nor his hospitalization.  Patient is wanting to return home after her headache is improved therefore she is discharged in stable condition.  All of her questions are answered.    Diagnosis:    ICD-10-CM   1. Acute nonintractable headache, unspecified headache type R51     Disposition: Patient discharged to home with friend      Leeanna Davenportuyen  10/31/2018   [facility] EMERGENCY DEPARTMENT    Scribe Disclosure:  I, Leeanna Silverio, am serving as a scribe at 7:41 PM on 10/31/2018 to document services personally performed by Trey Romano MD based on my observations and the provider's statements to me.          Trey Romano MD  11/02/18 044

## 2018-11-01 NOTE — ED TRIAGE NOTES
"Reports severe shooting pain to neck and head for 1 week, states she has fainted 4 times in the past 2 days but denies falling or hitting head; reports she currently feels dizzy; denies any injuries, ABCs intact.  States \"it's not just a headache\"; states pain is \"50\"/10. Nurse went to get w/c for pt; when returned to triage room 30 seconds later, pt found sitting in chair clutching head with muscles tensed, not responding to touch or voice, eyes rolled back, no tremors noted. Episode lasted approximately 30-45 seconds and pt afterward became immediately awake but states she was not aware of episode happening. Brought back to room; handoff to RN paged to room.  "

## 2019-02-24 ENCOUNTER — HOSPITAL ENCOUNTER (EMERGENCY)
Facility: CLINIC | Age: 29
Discharge: HOME | End: 2019-02-24
Attending: EMERGENCY MEDICINE | Admitting: EMERGENCY MEDICINE

## 2019-02-24 ENCOUNTER — APPOINTMENT (OUTPATIENT)
Dept: ULTRASOUND IMAGING | Facility: CLINIC | Age: 29
End: 2019-02-24
Attending: EMERGENCY MEDICINE

## 2019-02-24 VITALS
HEART RATE: 69 BPM | BODY MASS INDEX: 48.82 KG/M2 | TEMPERATURE: 97.4 F | RESPIRATION RATE: 16 BRPM | HEIGHT: 65 IN | WEIGHT: 293 LBS | SYSTOLIC BLOOD PRESSURE: 116 MMHG | OXYGEN SATURATION: 98 % | DIASTOLIC BLOOD PRESSURE: 68 MMHG

## 2019-02-24 DIAGNOSIS — R10.84 ABDOMINAL PAIN, GENERALIZED: ICD-10-CM

## 2019-02-24 DIAGNOSIS — N93.8 DYSFUNCTIONAL UTERINE BLEEDING: ICD-10-CM

## 2019-02-24 LAB
ALBUMIN SERPL-MCNC: 3.1 G/DL (ref 3.4–5)
ALBUMIN UR-MCNC: NEGATIVE MG/DL
ALP SERPL-CCNC: 128 U/L (ref 40–150)
ALT SERPL W P-5'-P-CCNC: 18 U/L (ref 0–50)
ANION GAP SERPL CALCULATED.3IONS-SCNC: 5 MMOL/L (ref 3–14)
APPEARANCE UR: ABNORMAL
AST SERPL W P-5'-P-CCNC: 16 U/L (ref 0–45)
BACTERIA #/AREA URNS HPF: ABNORMAL /HPF
BASOPHILS # BLD AUTO: 0 10E9/L (ref 0–0.2)
BASOPHILS NFR BLD AUTO: 0.3 %
BILIRUB SERPL-MCNC: 0.3 MG/DL (ref 0.2–1.3)
BILIRUB UR QL STRIP: NEGATIVE
BUN SERPL-MCNC: 11 MG/DL (ref 7–30)
CALCIUM SERPL-MCNC: 8.3 MG/DL (ref 8.5–10.1)
CHLORIDE SERPL-SCNC: 108 MMOL/L (ref 94–109)
CO2 SERPL-SCNC: 25 MMOL/L (ref 20–32)
COLOR UR AUTO: YELLOW
CREAT SERPL-MCNC: 0.77 MG/DL (ref 0.52–1.04)
DIFFERENTIAL METHOD BLD: ABNORMAL
EOSINOPHIL # BLD AUTO: 0.2 10E9/L (ref 0–0.7)
EOSINOPHIL NFR BLD AUTO: 1.7 %
ERYTHROCYTE [DISTWIDTH] IN BLOOD BY AUTOMATED COUNT: 13.5 % (ref 10–15)
GFR SERPL CREATININE-BSD FRML MDRD: >90 ML/MIN/{1.73_M2}
GLUCOSE SERPL-MCNC: 93 MG/DL (ref 70–99)
GLUCOSE UR STRIP-MCNC: NEGATIVE MG/DL
HCT VFR BLD AUTO: 38 % (ref 35–47)
HGB BLD-MCNC: 11.9 G/DL (ref 11.7–15.7)
HGB UR QL STRIP: ABNORMAL
IMM GRANULOCYTES # BLD: 0 10E9/L (ref 0–0.4)
IMM GRANULOCYTES NFR BLD: 0.4 %
INR PPP: 1.06 (ref 0.86–1.14)
KETONES UR STRIP-MCNC: NEGATIVE MG/DL
LEUKOCYTE ESTERASE UR QL STRIP: NEGATIVE
LYMPHOCYTES # BLD AUTO: 2.1 10E9/L (ref 0.8–5.3)
LYMPHOCYTES NFR BLD AUTO: 22 %
MCH RBC QN AUTO: 28.5 PG (ref 26.5–33)
MCHC RBC AUTO-ENTMCNC: 31.3 G/DL (ref 31.5–36.5)
MCV RBC AUTO: 91 FL (ref 78–100)
MONOCYTES # BLD AUTO: 0.5 10E9/L (ref 0–1.3)
MONOCYTES NFR BLD AUTO: 5.1 %
MUCOUS THREADS #/AREA URNS LPF: PRESENT /LPF
NEUTROPHILS # BLD AUTO: 6.7 10E9/L (ref 1.6–8.3)
NEUTROPHILS NFR BLD AUTO: 70.5 %
NITRATE UR QL: NEGATIVE
NRBC # BLD AUTO: 0 10*3/UL
NRBC BLD AUTO-RTO: 0 /100
PH UR STRIP: 5 PH (ref 5–7)
PLATELET # BLD AUTO: 336 10E9/L (ref 150–450)
POTASSIUM SERPL-SCNC: 3.9 MMOL/L (ref 3.4–5.3)
PROT SERPL-MCNC: 7.7 G/DL (ref 6.8–8.8)
RBC # BLD AUTO: 4.18 10E12/L (ref 3.8–5.2)
RBC #/AREA URNS AUTO: >182 /HPF (ref 0–2)
SODIUM SERPL-SCNC: 138 MMOL/L (ref 133–144)
SOURCE: ABNORMAL
SP GR UR STRIP: 1.02 (ref 1–1.03)
SQUAMOUS #/AREA URNS AUTO: 2 /HPF (ref 0–1)
UROBILINOGEN UR STRIP-MCNC: 0 MG/DL (ref 0–2)
WBC # BLD AUTO: 9.4 10E9/L (ref 4–11)
WBC #/AREA URNS AUTO: 4 /HPF (ref 0–5)

## 2019-02-24 PROCEDURE — 80053 COMPREHEN METABOLIC PANEL: CPT | Performed by: EMERGENCY MEDICINE

## 2019-02-24 PROCEDURE — 25000132 ZZH RX MED GY IP 250 OP 250 PS 637: Performed by: EMERGENCY MEDICINE

## 2019-02-24 PROCEDURE — 36415 COLL VENOUS BLD VENIPUNCTURE: CPT | Performed by: EMERGENCY MEDICINE

## 2019-02-24 PROCEDURE — 93976 VASCULAR STUDY: CPT

## 2019-02-24 PROCEDURE — 96360 HYDRATION IV INFUSION INIT: CPT

## 2019-02-24 PROCEDURE — 96361 HYDRATE IV INFUSION ADD-ON: CPT

## 2019-02-24 PROCEDURE — 85025 COMPLETE CBC W/AUTO DIFF WBC: CPT | Performed by: EMERGENCY MEDICINE

## 2019-02-24 PROCEDURE — 25000128 H RX IP 250 OP 636: Performed by: EMERGENCY MEDICINE

## 2019-02-24 PROCEDURE — 99284 EMERGENCY DEPT VISIT MOD MDM: CPT | Mod: 25

## 2019-02-24 PROCEDURE — 85610 PROTHROMBIN TIME: CPT | Performed by: EMERGENCY MEDICINE

## 2019-02-24 PROCEDURE — 81001 URINALYSIS AUTO W/SCOPE: CPT | Performed by: EMERGENCY MEDICINE

## 2019-02-24 RX ORDER — ACETAMINOPHEN 500 MG
500 TABLET ORAL EVERY 4 HOURS PRN
Status: DISCONTINUED | OUTPATIENT
Start: 2019-02-24 | End: 2019-02-24 | Stop reason: HOSPADM

## 2019-02-24 RX ADMIN — ACETAMINOPHEN 500 MG: 500 TABLET, FILM COATED ORAL at 11:39

## 2019-02-24 RX ADMIN — SODIUM CHLORIDE 1000 ML: 9 INJECTION, SOLUTION INTRAVENOUS at 11:39

## 2019-02-24 ASSESSMENT — MIFFLIN-ST. JEOR: SCORE: 2180.88

## 2019-02-24 ASSESSMENT — ENCOUNTER SYMPTOMS: ABDOMINAL PAIN: 1

## 2019-02-24 NOTE — DISCHARGE INSTRUCTIONS
Discharge Instructions  Vaginal Bleeding    You were seen today for unusual vaginal bleeding. Heavy or irregular bleeding may be caused by many different things such as hormone changes, infection, birth control, or cancer. At this time your doctor does not find that your bleeding is a sign of anything dangerous or life-threatening, and you have not lost enough blood to be dangerous.  However, sometimes the signs of serious illness do not show up right away.  If you have new or worse symptoms, you may need to be seen again in the Emergency Department or by your primary doctor.      Return to the Emergency Department if:  You feel lightheaded or faint.  You have a fever of 100.5 degrees or higher.  Your bleeding becomes much heavier than it is now, or if you start passing clots larger than a quarter.  You have severe cramping or abdominal pain.  You have any new or different symptoms.    Treatment:  Motrin , Advil  (ibuprofen) can help relieve cramps and can also decrease bleeding. You may use this according to the directions on the package. Do not use a medicine that you are allergic to, or if your doctor has told you not to use it.  Hormone pills or birth control pills may be used to help control the bleeding. These can cause problems if you have a history of blood clots or stroke, so tell your doctor if you have these problems before you leave.  Iron tablets may be recommended if you have anemia (low blood count.) Iron can cause constipation, so be sure to have plenty of fiber in your diet and let your doctor know if you have problems.  Drinking plenty of fluid is important. Be sure to drink extra water and other healthy drinks, like milk and juice.     Follow-up:  You should be seen by your regular doctor or a gynecologist within 2-3 days, or as directed by your provider here today.  We may have done tests for infection that take time to come back. We should contact you if these show infection that needs  treatment, but be sure to ask your regular doctor to check on them when you are seen.  If you were given a prescription for medicine here today, be sure to read all of the information (including the package insert) that comes with your prescription.  This will include important information about the medicine, its side effects, and any warnings that you need to know about.  The pharmacist who fills the prescription can provide more information and answer questions you may have about the medicine.  If you have questions or concerns that the pharmacist cannot address, please call or return to the Emergency Department.         Opioid Medication Information    Pain medications are among the most commonly prescribed medicines, so we are including this information for all our patients. If you did not receive pain medication or get a prescription for pain medicine, you can ignore it.     You may have been given a prescription for an opioid (narcotic) pain medicine and/or have received a pain medicine while here in the Emergency Department. These medicines can make you drowsy or impaired. You must not drive, operate dangerous equipment, or engage in any other dangerous activities while taking these medications. If you drive while taking these medications, you could be arrested for DUI, or driving under the influence. Do not drink any alcohol while you are taking these medications.     Opioid pain medications can cause addiction. If you have a history of chemical dependency of any type, you are at a higher risk of becoming addicted to pain medications.  Only take these prescribed medications to treat your pain when all other options have been tried. Take it for as short a time and as few doses as possible. Store your pain pills in a secure place, as they are frequently stolen and provide a dangerous opportunity for children or visitors in your house to start abusing these powerful medications. We will not replace any lost or  stolen medicine.  As soon as your pain is better, you should flush all your remaining medication.     Many prescription pain medications contain Tylenol  (acetaminophen), including Vicodin , Tylenol #3 , Norco , Lortab , and Percocet .  You should not take any extra pills of Tylenol  if you are using these prescription medications or you can get very sick.  Do not ever take more than 3000 mg of acetaminophen in any 24 hour period.    All opioids tend to cause constipation. Drink plenty of water and eat foods that have a lot of fiber, such as fruits, vegetables, prune juice, apple juice and high fiber cereal.  Take a laxative if you don?t move your bowels at least every other day. Miralax , Milk of Magnesia, Colace , or Senna  can be used to keep you regular.      Remember that you can always come back to the Emergency Department if you are not able to see your regular doctor in the amount of time listed above, if you get any new symptoms, or if there is anything that worries you.

## 2019-02-24 NOTE — ED AVS SNAPSHOT
[facility] Emergency Department  [address]  [facility] [address]  Phone:  [phone]  Fax:  [phone]                                    Siddharth Fontanez   MRN: [id number]    Department:  [facility] Emergency Department   Date of Visit:  2/24/2019           After Visit Summary Signature Page    I have received my discharge instructions, and my questions have been answered. I have discussed any challenges I see with this plan with the nurse or doctor.    ..........................................................................................................................................  Patient/Patient Representative Signature      ..........................................................................................................................................  Patient Representative Print Name and Relationship to Patient    ..................................................               ................................................  Date                                   Time    ..........................................................................................................................................  Reviewed by Signature/Title    ...................................................              ..............................................  Date                                               Time          22EPIC Rev 08/18

## 2019-02-24 NOTE — ED PROVIDER NOTES
"  History     Chief Complaint:    Vaginal Bleeding and Abdominal Pain    HPI   Siddharth Fontanez is a 29 year old female who presents with vaginal bleeding and abdominal pain. The patient reports that she has always had a history of irregular menstrual cycles and her primary physician wanted her to be started on birth control, however she chose not to due to weight gain as a possible side effect. The patient notes that 6 weeks ago, she started to spot for 2 weeks then for the past 4 weeks has had constant vaginal bleeding with large amounts of clotting. The patient states that she is also experiencing abdominal pain that sometimes radiates.  She describes the pain to be stabbing and excruciating. The patient has been taking ibuprofen and Tylenol to combat the pain with minimal relief.  She denies stool or urine changes. The patient also recalls that she was recently started on medications for seizure like activity.     Allergies:  No known drug allergies.       Medications:    No current medications.     Past Medical History:    Morbid obesity  Headache  Foot dislocation     Past Surgical History:    Past surgical history reviewed. No pertinent past surgical history.     Family History:    Mother: Diabetes, Hypertension    Social History:  The patient was accompanied to the ED by herself.  Smoking Status: Never Smoker  Smokeless Tobacco: Never Used  Alcohol Use: Negative  Drug Use: Negative  PCP: No Ref-Primary, Physician   Marital Status:  Single      Review of Systems   Gastrointestinal: Positive for abdominal pain.        No stool changes   Genitourinary: Positive for menstrual problem and vaginal bleeding.        No urine changes   All other systems reviewed and are negative.    Physical Exam   First Vitals:  BP: (!) 137/93  Pulse: 90  Temp: 97.4  F (36.3  C)  Resp: 16  Height: 165.1 cm (5' 5\")  Weight: 146.5 kg (322 lb 15.6 oz)  SpO2: 99 %    Physical Exam    General: The patient is alert, in no respiratory " distress.    HENT: Mucous membranes moist.    Cardiovascular: Regular rate and rhythm. Good pulses in all four extremities. Normal capillary refill and skin turgor.     Respiratory: Lungs are clear. No nasal flaring. No retractions. No wheezing, no crackles.    Gastrointestinal: Abdomen soft. No guarding, no rebound. No palpable hernias. Suprapubic abdominal tenderness. Large BMI.    Musculoskeletal: No gross deformity.     Skin: No rashes or petechiae.     Neurologic: The patient is alert and oriented x3. GCS 15. No testable cranial nerve deficit. Follows commands with clear and appropriate speech. Gives appropriate answers. Good strength in all extremities. No gross neurologic deficit. Gross sensation intact. Pupils are round and reactive. No meningismus.     Lymphatic: No cervical adenopathy. No lower extremity swelling.    Psychiatric: The patient is non-tearful.    Emergency Department Course     Imaging:  Radiology findings were communicated with the patient who voiced understanding of the findings.    US Pelvic Complete w Transvaginal & Abd/Pel Duplex Limited   Preliminary Result   IMPRESSION: Dominant right ovarian follicle.        Laboratory:  Laboratory findings were communicated with the patient who voiced understanding of the findings.    CBC: WBC 9.4, HGB 11.9,   CMP: Calcium 8.3 (L), Albumin 3.1 (L) o/w WNL (Creatinine 0.77)  INR: 1.06  UA: Blood large (A), RBC >182 (H), Bacteria few (A), Squamous Epithelial 2 (H), Mucous present (A), o/w WNL.     Interventions:  1139 NS 1000 mL IV  1139 Tylenol 500 mg PO     Emergency Department Course:     Nursing notes and vitals reviewed.    1031 I performed an exam of the patient as documented above.     1120 The patient provided a urine sample here in the emergency department. This was sent for laboratory testing, findings above.     1157 IV was inserted and blood was drawn for laboratory testing, results above.     1210 The patient was sent for a US while  in the emergency department, results above.      1355 Patient rechecked and updated.      1358 I personally reviewed the imaging and lab results with the patient and answered all related questions prior to discharge.    Impression & Plan      Medical Decision Making:  Siddharth Fontanez is a 29 year old female who presents to the emergency department today for evaluation of vaginal bleeding. The patient has had a longstanding history of abnormal periods with bleeding. The current period has been going on for several weeks. She said in the past she declined any oral contraceptives but would be open to it at this point. I did consider uterine fibroids, bleeding disorders, hepatitis, or other issues for a cause behind this but these are all unlikely given labs. She has no history of any abnormal bleeding. The patient was stable here, her hemoglobin is adequate, and she was started on OCP. She will follow up with her primary care doctor to see how this works and she was discharged in stable and good condition.     Diagnosis:    ICD-10-CM    1. Abdominal pain, generalized R10.84    2. Dysfunctional uterine bleeding N93.8       Disposition:   The patient is discharged to home.     Discharge Medications:     Review of your medicines      START taking      Dose / Directions   norgestrel-ethinyl estradiol 0.3-30 MG-MCG tablet  Commonly known as:  LO/OVRAL      Dose:  1 tablet  Take 1 tablet by mouth daily  Quantity:  30 tablet  Refills:  0           Where to get your medicines      Some of these will need a paper prescription and others can be bought over the counter. Ask your nurse if you have questions.    Bring a paper prescription for each of these medications    norgestrel-ethinyl estradiol 0.3-30 MG-MCG tablet       Scribe Disclosure:  I, Orla Severson, am serving as a scribe at 10:35 AM on 2/24/2019 to document services personally performed by Michael Abreu MD based on my observations and the provider's statements  to me.     [facility] EMERGENCY DEPARTMENT       Michael Abreu MD  02/24/19 6418

## 2019-02-24 NOTE — ED TRIAGE NOTES
Period started 2/5/19.  Has been having abdominal pain since 2 weeks before period started.  ABCDs intact.

## 2020-02-01 ENCOUNTER — HOSPITAL ENCOUNTER (EMERGENCY)
Facility: CLINIC | Age: 30
Discharge: HOME | End: 2020-02-01
Attending: EMERGENCY MEDICINE | Admitting: EMERGENCY MEDICINE
Payer: COMMERCIAL

## 2020-02-01 VITALS
OXYGEN SATURATION: 99 % | SYSTOLIC BLOOD PRESSURE: 126 MMHG | TEMPERATURE: 97.8 F | HEART RATE: 73 BPM | RESPIRATION RATE: 18 BRPM | DIASTOLIC BLOOD PRESSURE: 71 MMHG

## 2020-02-01 DIAGNOSIS — G43.809 OTHER MIGRAINE WITHOUT STATUS MIGRAINOSUS, NOT INTRACTABLE: ICD-10-CM

## 2020-02-01 DIAGNOSIS — D64.9 ANEMIA, UNSPECIFIED TYPE: ICD-10-CM

## 2020-02-01 LAB
ANION GAP SERPL CALCULATED.3IONS-SCNC: 4 MMOL/L (ref 3–14)
B-HCG FREE SERPL-ACNC: <5 IU/L
BASOPHILS # BLD AUTO: 0 10E9/L (ref 0–0.2)
BASOPHILS NFR BLD AUTO: 0.3 %
BUN SERPL-MCNC: 12 MG/DL (ref 7–30)
CALCIUM SERPL-MCNC: 8.2 MG/DL (ref 8.5–10.1)
CHLORIDE SERPL-SCNC: 107 MMOL/L (ref 94–109)
CO2 SERPL-SCNC: 29 MMOL/L (ref 20–32)
CREAT SERPL-MCNC: 0.88 MG/DL (ref 0.52–1.04)
DIFFERENTIAL METHOD BLD: ABNORMAL
EOSINOPHIL # BLD AUTO: 0.2 10E9/L (ref 0–0.7)
EOSINOPHIL NFR BLD AUTO: 2.3 %
ERYTHROCYTE [DISTWIDTH] IN BLOOD BY AUTOMATED COUNT: 16.9 % (ref 10–15)
GFR SERPL CREATININE-BSD FRML MDRD: 87 ML/MIN/{1.73_M2}
GLUCOSE SERPL-MCNC: 101 MG/DL (ref 70–99)
HCT VFR BLD AUTO: 29.8 % (ref 35–47)
HGB BLD-MCNC: 8.8 G/DL (ref 11.7–15.7)
IMM GRANULOCYTES # BLD: 0 10E9/L (ref 0–0.4)
IMM GRANULOCYTES NFR BLD: 0.4 %
LYMPHOCYTES # BLD AUTO: 3.3 10E9/L (ref 0.8–5.3)
LYMPHOCYTES NFR BLD AUTO: 31.5 %
MCH RBC QN AUTO: 23.3 PG (ref 26.5–33)
MCHC RBC AUTO-ENTMCNC: 29.5 G/DL (ref 31.5–36.5)
MCV RBC AUTO: 79 FL (ref 78–100)
MONOCYTES # BLD AUTO: 0.4 10E9/L (ref 0–1.3)
MONOCYTES NFR BLD AUTO: 4.1 %
NEUTROPHILS # BLD AUTO: 6.4 10E9/L (ref 1.6–8.3)
NEUTROPHILS NFR BLD AUTO: 61.4 %
NRBC # BLD AUTO: 0 10*3/UL
NRBC BLD AUTO-RTO: 0 /100
PLATELET # BLD AUTO: 459 10E9/L (ref 150–450)
POTASSIUM SERPL-SCNC: 3.2 MMOL/L (ref 3.4–5.3)
RBC # BLD AUTO: 3.78 10E12/L (ref 3.8–5.2)
SODIUM SERPL-SCNC: 140 MMOL/L (ref 133–144)
WBC # BLD AUTO: 10.4 10E9/L (ref 4–11)

## 2020-02-01 PROCEDURE — 80048 BASIC METABOLIC PNL TOTAL CA: CPT | Performed by: EMERGENCY MEDICINE

## 2020-02-01 PROCEDURE — 96375 TX/PRO/DX INJ NEW DRUG ADDON: CPT

## 2020-02-01 PROCEDURE — 25800030 ZZH RX IP 258 OP 636: Performed by: EMERGENCY MEDICINE

## 2020-02-01 PROCEDURE — 99284 EMERGENCY DEPT VISIT MOD MDM: CPT | Mod: 25

## 2020-02-01 PROCEDURE — 25000128 H RX IP 250 OP 636: Performed by: EMERGENCY MEDICINE

## 2020-02-01 PROCEDURE — 84702 CHORIONIC GONADOTROPIN TEST: CPT

## 2020-02-01 PROCEDURE — 25000125 ZZHC RX 250: Performed by: EMERGENCY MEDICINE

## 2020-02-01 PROCEDURE — 96374 THER/PROPH/DIAG INJ IV PUSH: CPT

## 2020-02-01 PROCEDURE — 25000132 ZZH RX MED GY IP 250 OP 250 PS 637: Performed by: EMERGENCY MEDICINE

## 2020-02-01 PROCEDURE — 85025 COMPLETE CBC W/AUTO DIFF WBC: CPT | Performed by: EMERGENCY MEDICINE

## 2020-02-01 PROCEDURE — 96361 HYDRATE IV INFUSION ADD-ON: CPT

## 2020-02-01 RX ORDER — ONDANSETRON 2 MG/ML
4 INJECTION INTRAMUSCULAR; INTRAVENOUS ONCE
Status: COMPLETED | OUTPATIENT
Start: 2020-02-01 | End: 2020-02-01

## 2020-02-01 RX ORDER — KETOROLAC TROMETHAMINE 15 MG/ML
10 INJECTION, SOLUTION INTRAMUSCULAR; INTRAVENOUS ONCE
Status: COMPLETED | OUTPATIENT
Start: 2020-02-01 | End: 2020-02-01

## 2020-02-01 RX ORDER — DIPHENHYDRAMINE HYDROCHLORIDE 50 MG/ML
50 INJECTION INTRAMUSCULAR; INTRAVENOUS ONCE
Status: COMPLETED | OUTPATIENT
Start: 2020-02-01 | End: 2020-02-01

## 2020-02-01 RX ORDER — SODIUM CHLORIDE 9 MG/ML
1000 INJECTION, SOLUTION INTRAVENOUS CONTINUOUS
Status: DISCONTINUED | OUTPATIENT
Start: 2020-02-01 | End: 2020-02-01 | Stop reason: HOSPADM

## 2020-02-01 RX ORDER — PHENTERMINE HYDROCHLORIDE 37.5 MG/1
37.5 TABLET ORAL
COMMUNITY
End: 2023-01-06

## 2020-02-01 RX ORDER — POTASSIUM CHLORIDE 1.5 G/1.58G
40 POWDER, FOR SOLUTION ORAL ONCE
Status: COMPLETED | OUTPATIENT
Start: 2020-02-01 | End: 2020-02-01

## 2020-02-01 RX ORDER — LIDOCAINE HYDROCHLORIDE 40 MG/ML
1 INJECTION, SOLUTION RETROBULBAR ONCE
Status: COMPLETED | OUTPATIENT
Start: 2020-02-01 | End: 2020-02-01

## 2020-02-01 RX ORDER — DEXAMETHASONE SODIUM PHOSPHATE 10 MG/ML
10 INJECTION, SOLUTION INTRAMUSCULAR; INTRAVENOUS ONCE
Status: COMPLETED | OUTPATIENT
Start: 2020-02-01 | End: 2020-02-01

## 2020-02-01 RX ADMIN — SODIUM CHLORIDE 1000 ML: 9 INJECTION, SOLUTION INTRAVENOUS at 02:30

## 2020-02-01 RX ADMIN — PROCHLORPERAZINE EDISYLATE 10 MG: 5 INJECTION INTRAMUSCULAR; INTRAVENOUS at 02:41

## 2020-02-01 RX ADMIN — DEXAMETHASONE SODIUM PHOSPHATE 10 MG: 10 INJECTION, SOLUTION INTRAMUSCULAR; INTRAVENOUS at 02:39

## 2020-02-01 RX ADMIN — DIPHENHYDRAMINE HYDROCHLORIDE 50 MG: 50 INJECTION, SOLUTION INTRAMUSCULAR; INTRAVENOUS at 02:35

## 2020-02-01 RX ADMIN — LIDOCAINE HYDROCHLORIDE 1 ML: 40 INJECTION, SOLUTION RETROBULBAR; TOPICAL at 02:31

## 2020-02-01 RX ADMIN — KETOROLAC TROMETHAMINE 10 MG: 15 INJECTION, SOLUTION INTRAMUSCULAR; INTRAVENOUS at 02:37

## 2020-02-01 RX ADMIN — ONDANSETRON HYDROCHLORIDE 4 MG: 2 INJECTION, SOLUTION INTRAMUSCULAR; INTRAVENOUS at 03:57

## 2020-02-01 RX ADMIN — POTASSIUM CHLORIDE 40 MEQ: 1.5 FOR SOLUTION ORAL at 03:26

## 2020-02-01 ASSESSMENT — ENCOUNTER SYMPTOMS
VOMITING: 1
HEADACHES: 1
NAUSEA: 1
PHOTOPHOBIA: 1
MYALGIAS: 1

## 2020-02-01 NOTE — ED PROVIDER NOTES
History   Chief Complaint:  Headache     HPI   Siddharth Fontanez is an otherwise healthy 30 year old female who presents with a headache. The patient reports that two days ago she developed a gradually worsening sharp, generalized headache and photosensitivity as well as myalgias. She states that today she further developed nausea and vomiting. The patient notes that it feels similar to past headaches although it is more severe.     Allergies:  No known drug allergies    Medications:   Norgestrel-ethinyl estradiol     Past Medical History:    Morbid obesity  Headache     Past Surgical History:    History reviewed. No pertinent surgical history.    Family History:    Hypertension  Diabetes    Social History:  Smoking status: Never smoker  Alcohol use: No    Review of Systems   Eyes: Positive for photophobia.   Gastrointestinal: Positive for nausea and vomiting.   Musculoskeletal: Positive for myalgias.   Neurological: Positive for headaches.   All other systems reviewed and are negative.        Physical Exam   Patient Vitals for the past 24 hrs:   BP Temp Temp src Pulse Heart Rate Resp SpO2   02/01/20 0345 -- -- -- -- -- -- 99 %   02/01/20 0330 126/71 -- -- 73 -- -- 99 %   02/01/20 0315 -- -- -- -- -- -- 99 %   02/01/20 0300 119/59 -- -- 78 -- -- 100 %   02/01/20 0245 122/67 -- -- 76 -- -- 100 %   02/01/20 0130 117/78 97.8  F (36.6  C) Oral 94 94 18 97 %     Physical Exam  Constitutional:  Oriented to person, place, and time. Minor distress secondary to pain.  HENT:   Head:    Normocephalic.   Mouth/Throat:   Oropharynx is clear and moist.   Eyes:    EOM are normal. Pupils are equal, round, and reactive to light.   Neck:    Neck supple.   Cardiovascular:  Normal rate, regular rhythm and normal heart sounds.      Exam reveals no gallop and no friction rub.       No murmur heard.  Pulmonary/Chest:  Effort normal and breath sounds normal.      No respiratory distress. No wheezes. No rales.      No reproducible chest wall  pain.  Abdominal:   Soft. No distension. No tenderness. No rebound and no guarding.   Musculoskeletal:  Normal range of motion.   Neurological:   Cranial nerves intact. No meningeal signs. Alert and oriented to person, place, and time.           Moves all 4 extremities spontaneously    Skin:    No rash noted. No pallor.     Emergency Department Course   Laboratory:  Laboratory findings were communicated with the patient who voiced understanding of the findings.    CBC: HGB 8.8 (L),  (H) o/w WNL (WBC 10.4)  BMP: Potassium 3.2 (L), Glucose 101 (H), Calcium 8.2 (L) o/w WNL (Creatinine 0.88)  ISTAT hCG Quant: <5.0    Interventions:  0230 NS 1L IV Bolus  0235 Benadryl 50 mg IV  0237 Toradol 10 mg IV  0239 Decadron 10 mg IV  0241 Compazine 10 mg IV  0326 potassium chloride 40 mEq PO  0357 Zofran 4 mg IV     Emergency Department Course:  Past medical records, nursing notes, and vitals reviewed.   0140 I performed an exam of the patient and obtained history, as documented above.    IV inserted and blood drawn.    I rechecked the patient. Explained findings to the patient.    Findings and plan explained to the patient. Patient discharged home with instructions regarding supportive care, medications, and reasons to return. The importance of close follow-up was reviewed.     Impression & Plan    Medical Decision Making:  Siddharth Fontanez is a 30 year old female who presents with a headache. She has a history of migraines in the past. I considered a broad differential diagnosis for this patient including tension, migraine, analgesic rebound, occipital neuralgia, etc. also considered other less common but serious causes considered included meningitis, encephalitis, subarachnoid bleed, temporal arteritis, stroke, tumor, etc. She has no signs of serious headache etiologies at this point. No advanced imaging is indicated, nor is CT/lumbar puncture for SAH. Of note, the patient was noted to be anemic to 8.8 likely due to heavy  menstrual bleeding. Her questions were answered and she feels improved after above interventions in ED. Supportive outpatient management is therefore indicated.  Headache precautions given for home.      Diagnosis:    ICD-10-CM   1. Other migraine without status migrainosus, not intractable G43.809   2. Anemia, unspecified type D64.9        Disposition:  Discharged to home.        2/1/2020   Brody Escalante I, Brody Escalante, am serving as a scribe at 1:40 AM on 2/1/2020 to document services personally performed by Eleno Lazaro MD based on my observations and the provider's statements to me.      Eleno Lazaro MD  02/01/20 0552

## 2020-02-01 NOTE — ED TRIAGE NOTES
Here for migraine headache started few days ago and getting worse associated with n/v and dizziness. Have been taking tylenol and ibuprofen for pain, Excedrin around 7:30pm and last ibuprofen at 2pm. ABCs intact.

## 2020-02-01 NOTE — DISCHARGE INSTRUCTIONS
Discharge Instructions  Migraine    You were seen today for a headache that your doctor thinks is a migraine. At this time your doctor does not find that your headache is a sign of anything dangerous or life-threatening.  However, sometimes the signs of serious illness do not show up right away.  If you have new or worse symptoms, you may need to be seen again in the Emergency Department or by your primary doctor.  Follow up with your regular doctor as directed today, or within the next week.     Return to the Emergency Department if:  You get a fever of 101 F or higher.  Your headache gets much worse.  You get a stiff neck with your headache.  You get a new headache that is different or worse than headaches you have had before.  You are vomiting and can t keep food or water down.  You have blurry or double vision or other problems with your eyes.  You have a new weakness on one side of your body.  You have difficulty with balance which is new.  You or your family thinks you are confused.  You have a seizure or convulsion.    Treatment:  Often, treatment for your migraine will take some time to make you headache stop.  Going home to sleep can be very effective.  Use your medications as directed because overuse can actually cause headaches.  Once your headache has gone away, avoid triggers such as certain foods, skipping meals, bright lights, changes in sleep, exercise and stress.  Migraine headaches can have symptoms before the pain starts, like vision changes, funny smells/tastes, dizziness or other symptoms.  Treating a headache as soon as the first symptoms come on is very important and gives the best chance of stopping the headache.  If headaches are severe or frequent you may need to start daily medication to prevent the headaches.  Carbon monoxide can cause headaches, so not burning things in your home is important.  Also get a carbon monoxide detector.  Some medications for migraines may raise your blood  pressure, so use with caution if you have high blood pressure or heart problems.  If you were given a prescription for medicine here today, be sure to read all of the information (including the package insert) that comes with your prescription.  This will include important information about the medicine, its side effects, and any warnings that you need to know about.  The pharmacist who fills the prescription can provide more information and answer questions you may have about the medicine.  If you have questions or concerns that the pharmacist cannot address, please call or return to the Emergency Department.       Opioid Medication Information    Pain medications are among the most commonly prescribed medicines, so we are including this information for all our patients. If you did not receive pain medication or get a prescription for pain medicine, you can ignore it.     You may have been given a prescription for an opioid (narcotic) pain medicine and/or have received a pain medicine while here in the Emergency Department. These medicines can make you drowsy or impaired. You must not drive, operate dangerous equipment, or engage in any other dangerous activities while taking these medications. If you drive while taking these medications, you could be arrested for DUI, or driving under the influence. Do not drink any alcohol while you are taking these medications.     Opioid pain medications can cause addiction. If you have a history of chemical dependency of any type, you are at a higher risk of becoming addicted to pain medications.  Only take these prescribed medications to treat your pain when all other options have been tried. Take it for as short a time and as few doses as possible. Store your pain pills in a secure place, as they are frequently stolen and provide a dangerous opportunity for children or visitors in your house to start abusing these powerful medications. We will not replace any lost or stolen  medicine.  As soon as your pain is better, you should flush all your remaining medication.     Many prescription pain medications contain Tylenol  (acetaminophen), including Vicodin , Tylenol #3 , Norco , Lortab , and Percocet .  You should not take any extra pills of Tylenol  if you are using these prescription medications or you can get very sick.  Do not ever take more than 3000 mg of acetaminophen in any 24 hour period.    All opioids tend to cause constipation. Drink plenty of water and eat foods that have a lot of fiber, such as fruits, vegetables, prune juice, apple juice and high fiber cereal.  Take a laxative if you don t move your bowels at least every other day. Miralax , Milk of Magnesia, Colace , or Senna  can be used to keep you regular.      Remember that you can always come back to the Emergency Department if you are not able to see your regular doctor in the amount of time listed above, if you get any new symptoms, or if there is anything that worries you.

## 2020-02-01 NOTE — ED AVS SNAPSHOT
[facility] Emergency Department  [address]  [facility] [address]  Phone:  [phone]  Fax:  [phone]                                    Siddharth Fontanez   MRN: [id number]    Department:  [facility] Emergency Department   Date of Visit:  2/1/2020           After Visit Summary Signature Page    I have received my discharge instructions, and my questions have been answered. I have discussed any challenges I see with this plan with the nurse or doctor.    ..........................................................................................................................................  Patient/Patient Representative Signature      ..........................................................................................................................................  Patient Representative Print Name and Relationship to Patient    ..................................................               ................................................  Date                                   Time    ..........................................................................................................................................  Reviewed by Signature/Title    ...................................................              ..............................................  Date                                               Time          22EPIC Rev 08/18

## 2020-02-10 ENCOUNTER — HOSPITAL ENCOUNTER (EMERGENCY)
Facility: CLINIC | Age: 30
Discharge: HOME | End: 2020-02-10
Attending: EMERGENCY MEDICINE | Admitting: EMERGENCY MEDICINE
Payer: COMMERCIAL

## 2020-02-10 ENCOUNTER — APPOINTMENT (OUTPATIENT)
Dept: CT IMAGING | Facility: CLINIC | Age: 30
End: 2020-02-10
Attending: EMERGENCY MEDICINE
Payer: COMMERCIAL

## 2020-02-10 VITALS
SYSTOLIC BLOOD PRESSURE: 124 MMHG | HEIGHT: 65 IN | TEMPERATURE: 97.8 F | WEIGHT: 293 LBS | RESPIRATION RATE: 16 BRPM | BODY MASS INDEX: 48.82 KG/M2 | DIASTOLIC BLOOD PRESSURE: 82 MMHG | OXYGEN SATURATION: 98 % | HEART RATE: 97 BPM

## 2020-02-10 DIAGNOSIS — R03.0 ELEVATED BLOOD PRESSURE READING WITHOUT DIAGNOSIS OF HYPERTENSION: ICD-10-CM

## 2020-02-10 DIAGNOSIS — G44.89 OTHER HEADACHE SYNDROME: ICD-10-CM

## 2020-02-10 DIAGNOSIS — S06.0X0A CONCUSSION WITHOUT LOSS OF CONSCIOUSNESS, INITIAL ENCOUNTER: ICD-10-CM

## 2020-02-10 LAB
ANION GAP SERPL CALCULATED.3IONS-SCNC: 4 MMOL/L (ref 3–14)
B-HCG FREE SERPL-ACNC: <5 IU/L
BASOPHILS # BLD AUTO: 0 10E9/L (ref 0–0.2)
BASOPHILS NFR BLD AUTO: 0.2 %
BUN SERPL-MCNC: 10 MG/DL (ref 7–30)
CALCIUM SERPL-MCNC: 8.1 MG/DL (ref 8.5–10.1)
CHLORIDE SERPL-SCNC: 107 MMOL/L (ref 94–109)
CO2 SERPL-SCNC: 29 MMOL/L (ref 20–32)
CREAT SERPL-MCNC: 0.9 MG/DL (ref 0.52–1.04)
DIFFERENTIAL METHOD BLD: ABNORMAL
EOSINOPHIL # BLD AUTO: 0.2 10E9/L (ref 0–0.7)
EOSINOPHIL NFR BLD AUTO: 1.6 %
ERYTHROCYTE [DISTWIDTH] IN BLOOD BY AUTOMATED COUNT: 16.9 % (ref 10–15)
GFR SERPL CREATININE-BSD FRML MDRD: 84 ML/MIN/{1.73_M2}
GLUCOSE SERPL-MCNC: 105 MG/DL (ref 70–99)
HCT VFR BLD AUTO: 30.5 % (ref 35–47)
HGB BLD-MCNC: 9 G/DL (ref 11.7–15.7)
IMM GRANULOCYTES # BLD: 0.1 10E9/L (ref 0–0.4)
IMM GRANULOCYTES NFR BLD: 0.5 %
INR PPP: 1.12 (ref 0.86–1.14)
LYMPHOCYTES # BLD AUTO: 3.1 10E9/L (ref 0.8–5.3)
LYMPHOCYTES NFR BLD AUTO: 23 %
MCH RBC QN AUTO: 23.8 PG (ref 26.5–33)
MCHC RBC AUTO-ENTMCNC: 29.5 G/DL (ref 31.5–36.5)
MCV RBC AUTO: 81 FL (ref 78–100)
MONOCYTES # BLD AUTO: 0.6 10E9/L (ref 0–1.3)
MONOCYTES NFR BLD AUTO: 4.2 %
NEUTROPHILS # BLD AUTO: 9.4 10E9/L (ref 1.6–8.3)
NEUTROPHILS NFR BLD AUTO: 70.5 %
NRBC # BLD AUTO: 0 10*3/UL
NRBC BLD AUTO-RTO: 0 /100
PLATELET # BLD AUTO: 478 10E9/L (ref 150–450)
POTASSIUM SERPL-SCNC: 3.2 MMOL/L (ref 3.4–5.3)
RBC # BLD AUTO: 3.78 10E12/L (ref 3.8–5.2)
SODIUM SERPL-SCNC: 140 MMOL/L (ref 133–144)
WBC # BLD AUTO: 13.4 10E9/L (ref 4–11)

## 2020-02-10 PROCEDURE — 84702 CHORIONIC GONADOTROPIN TEST: CPT

## 2020-02-10 PROCEDURE — 96374 THER/PROPH/DIAG INJ IV PUSH: CPT

## 2020-02-10 PROCEDURE — 82375 ASSAY CARBOXYHB QUANT: CPT | Performed by: EMERGENCY MEDICINE

## 2020-02-10 PROCEDURE — 99285 EMERGENCY DEPT VISIT HI MDM: CPT | Mod: 25

## 2020-02-10 PROCEDURE — 96375 TX/PRO/DX INJ NEW DRUG ADDON: CPT

## 2020-02-10 PROCEDURE — 25000128 H RX IP 250 OP 636: Performed by: EMERGENCY MEDICINE

## 2020-02-10 PROCEDURE — 80048 BASIC METABOLIC PNL TOTAL CA: CPT | Performed by: EMERGENCY MEDICINE

## 2020-02-10 PROCEDURE — 85025 COMPLETE CBC W/AUTO DIFF WBC: CPT | Performed by: EMERGENCY MEDICINE

## 2020-02-10 PROCEDURE — 85610 PROTHROMBIN TIME: CPT | Performed by: EMERGENCY MEDICINE

## 2020-02-10 PROCEDURE — 70450 CT HEAD/BRAIN W/O DYE: CPT

## 2020-02-10 RX ORDER — DIPHENHYDRAMINE HYDROCHLORIDE 50 MG/ML
50 INJECTION INTRAMUSCULAR; INTRAVENOUS ONCE
Status: COMPLETED | OUTPATIENT
Start: 2020-02-10 | End: 2020-02-10

## 2020-02-10 RX ORDER — KETOROLAC TROMETHAMINE 30 MG/ML
30 INJECTION, SOLUTION INTRAMUSCULAR; INTRAVENOUS ONCE
Status: DISCONTINUED | OUTPATIENT
Start: 2020-02-10 | End: 2020-02-10 | Stop reason: HOSPADM

## 2020-02-10 RX ORDER — DEXAMETHASONE SODIUM PHOSPHATE 10 MG/ML
10 INJECTION, SOLUTION INTRAMUSCULAR; INTRAVENOUS ONCE
Status: COMPLETED | OUTPATIENT
Start: 2020-02-10 | End: 2020-02-10

## 2020-02-10 RX ADMIN — DIPHENHYDRAMINE HYDROCHLORIDE 50 MG: 50 INJECTION, SOLUTION INTRAMUSCULAR; INTRAVENOUS at 18:19

## 2020-02-10 RX ADMIN — DEXAMETHASONE SODIUM PHOSPHATE 10 MG: 10 INJECTION, SOLUTION INTRAMUSCULAR; INTRAVENOUS at 18:23

## 2020-02-10 RX ADMIN — PROCHLORPERAZINE EDISYLATE 10 MG: 5 INJECTION INTRAMUSCULAR; INTRAVENOUS at 18:20

## 2020-02-10 ASSESSMENT — ENCOUNTER SYMPTOMS
HEADACHES: 1
VOMITING: 1
NAUSEA: 1
CHILLS: 1
DIARRHEA: 1
ABDOMINAL PAIN: 1
CONSTIPATION: 1
FEVER: 1
PHOTOPHOBIA: 1

## 2020-02-10 ASSESSMENT — MIFFLIN-ST. JEOR: SCORE: 2054.88

## 2020-02-10 NOTE — ED AVS SNAPSHOT
[facility] Emergency Department  [address]  [facility] [address]  Phone:  [phone]  Fax:  [phone]                                    Siddharth Fontanez   MRN: [id number]    Department:  [facility] Emergency Department   Date of Visit:  2/10/2020           After Visit Summary Signature Page    I have received my discharge instructions, and my questions have been answered. I have discussed any challenges I see with this plan with the nurse or doctor.    ..........................................................................................................................................  Patient/Patient Representative Signature      ..........................................................................................................................................  Patient Representative Print Name and Relationship to Patient    ..................................................               ................................................  Date                                   Time    ..........................................................................................................................................  Reviewed by Signature/Title    ...................................................              ..............................................  Date                                               Time          22EPIC Rev 08/18
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
## 2020-02-11 NOTE — ED NOTES
Pt requesting to leave as she states  has to work at 8. MD updated. Pt informed that lab was not able to process her carbon monoxide test and it would need to be redrawn if she wanted result. Pt understands this and is still requesting to leave.

## 2020-02-11 NOTE — DISCHARGE INSTRUCTIONS
Discharge Instructions  Headache    You were seen today for a headache. Headaches may be caused by many different things such as muscle tension, sinus inflammation, anxiety and stress, having too little sleep, too much alcohol, some medical conditions or injury. You may have a migraine, which is caused by changes in the blood vessels in your head.  At this time your doctor does not find that your headache is a sign of anything dangerous or life-threatening.  However, sometimes the signs of serious illness do not show up right away.  If you have new or worse symptoms, you may need to be seen again in the emergency department or by your primary doctor.      Return to the Emergency Department if:  You get a fever of 101 F or higher.  Your headache gets much worse.  You get a stiff neck with your headache.  You get a new headache that is different or worse than headaches you have had before.  You are vomiting and can t keep food or water down.  You have blurry or double vision or other problems with your eyes.  You have a new weakness on one side of your body.  You have difficulty with balance which is new.  You or your family thinks you are confused.  You have a seizure or convulsion.    What can I do to help myself?  Pain medications - You may take a pain medication such as Tylenol  (acetaminophen), Advil , Nuprin  (ibuprofen) or Aleve  (naproxen).  If you have been given a narcotic such as Vicodin  (hydrocodone with acetaminophen), Percocet  (oxycodone with acetaminophen), codeine, or a muscle relaxant such as Flexeril  (cyclobenzaprine) or Soma  (carisoprodol), do not drive for four hours after you have taken it. If the narcotic contains Tylenol  (acetaminophen), do not take Tylenol  with it. All narcotics will cause constipation, so eat a high fiber diet.      Take a pain reliever as soon as you notice symptoms.  Starting medications as soon as you start to have symptoms may lessen the amount of pain you  have.  Relaxing in a quiet, dark room may help.  Get enough sleep and eat meals regularly.  Schedule an appointment with your primary physician as instructed, or at least within 1 week.  You may need to watch for certain foods or other things which may trigger your headaches.  Keeping a journal of your headaches and possible triggers may help you and your primary doctor to identify things which you should avoid which may be causing your headaches.  If you were given a prescription for medicine here today, be sure to read all of the information (including the package insert) that comes with your prescription.  This will include important information about the medicine, its side effects, and any warnings that you need to know about.  The pharmacist who fills the prescription can provide more information and answer questions you may have about the medicine.  If you have questions or concerns that the pharmacist cannot address, please call or return to the Emergency Department.   Opioid Medication Information    Pain medications are among the most commonly prescribed medicines, so we are including this information for all our patients. If you did not receive pain medication or get a prescription for pain medicine, you can ignore it.     You may have been given a prescription for an opioid (narcotic) pain medicine and/or have received a pain medicine while here in the Emergency Department. These medicines can make you drowsy or impaired. You must not drive, operate dangerous equipment, or engage in any other dangerous activities while taking these medications. If you drive while taking these medications, you could be arrested for DUI, or driving under the influence. Do not drink any alcohol while you are taking these medications.     Opioid pain medications can cause addiction. If you have a history of chemical dependency of any type, you are at a higher risk of becoming addicted to pain medications.  Only take these  prescribed medications to treat your pain when all other options have been tried. Take it for as short a time and as few doses as possible. Store your pain pills in a secure place, as they are frequently stolen and provide a dangerous opportunity for children or visitors in your house to start abusing these powerful medications. We will not replace any lost or stolen medicine.  As soon as your pain is better, you should flush all your remaining medication.     Many prescription pain medications contain Tylenol  (acetaminophen), including Vicodin , Tylenol #3 , Norco , Lortab , and Percocet .  You should not take any extra pills of Tylenol  if you are using these prescription medications or you can get very sick.  Do not ever take more than 3000 mg of acetaminophen in any 24 hour period.    All opioids tend to cause constipation. Drink plenty of water and eat foods that have a lot of fiber, such as fruits, vegetables, prune juice, apple juice and high fiber cereal.  Take a laxative if you don t move your bowels at least every other day. Miralax , Milk of Magnesia, Colace , or Senna  can be used to keep you regular.      Remember that you can always come back to the Emergency Department if you are not able to see your regular doctor in the amount of time listed above, if you get any new symptoms, or if there is anything that worries you.    Discharge Instructions  Hypertension - High Blood Pressure    During you visit to the Emergency Department, your blood pressure was higher than the recommended blood pressure.  This may be related to stress, pain, medication or other temporary conditions. In these cases, your blood pressure may return to normal on its own. If you have a history of high blood pressure, you may need to have your doctor adjust your medications. Sometimes, your high measurement here may indicate that you have developed high blood pressure that will stay high unless it is treated. Sudden very high blood  pressure can cause problems, but usually high blood pressure causes problems over months to years.      Blood pressure is almost never lowered in the Emergency Department, because studies have shown that lowering blood pressure too quickly is much more dangerous than leaving it alone.    You need to follow up with your doctor in 1-3 days to get your blood pressure rechecked.     Return to the Emergency Department if you start to have:  A severe headache.  Chest pain.  Shortness of breath.  Weakness or numbness that affects one part of the body.  Confusion.  Vision changes.  Significant swelling of legs and/or eyes.  A reaction to any medication started in the Emergency Department.    What can I do to help myself?  Avoid alcohol.  Take any blood pressure medicine that you are prescribed.  Get a good night s sleep.  Lower your salt intake.  Exercise.  Lose weight.  Manage stress.    If blood pressure medication was started in the Emergency Department:  The medicine may not have an immediate effect. The body and brain determine what blood pressure you have. The medicine s job is to retrain the body s  thermostat  to a lower blood pressure.  You will need to follow up with your doctor to see how this medicine is working for you.  If you were given a prescription for medicine here today, be sure to read all of the information (including the package insert) that comes with your prescription.  This will include important information about the medicine, its side effects, and any warnings that you need to know about.  The pharmacist who fills the prescription can provide more information and answer questions you may have about the medicine.  If you have questions or concerns that the pharmacist cannot address, please call or return to the Emergency Department.   Opioid Medication Information    Pain medications are among the most commonly prescribed medicines, so we are including this information for all our patients. If you  did not receive pain medication or get a prescription for pain medicine, you can ignore it.     You may have been given a prescription for an opioid (narcotic) pain medicine and/or have received a pain medicine while here in the Emergency Department. These medicines can make you drowsy or impaired. You must not drive, operate dangerous equipment, or engage in any other dangerous activities while taking these medications. If you drive while taking these medications, you could be arrested for DUI, or driving under the influence. Do not drink any alcohol while you are taking these medications.     Opioid pain medications can cause addiction. If you have a history of chemical dependency of any type, you are at a higher risk of becoming addicted to pain medications.  Only take these prescribed medications to treat your pain when all other options have been tried. Take it for as short a time and as few doses as possible. Store your pain pills in a secure place, as they are frequently stolen and provide a dangerous opportunity for children or visitors in your house to start abusing these powerful medications. We will not replace any lost or stolen medicine.  As soon as your pain is better, you should flush all your remaining medication.     Many prescription pain medications contain Tylenol  (acetaminophen), including Vicodin , Tylenol #3 , Norco , Lortab , and Percocet .  You should not take any extra pills of Tylenol  if you are using these prescription medications or you can get very sick.  Do not ever take more than 3000 mg of acetaminophen in any 24 hour period.    All opioids tend to cause constipation. Drink plenty of water and eat foods that have a lot of fiber, such as fruits, vegetables, prune juice, apple juice and high fiber cereal.  Take a laxative if you don t move your bowels at least every other day. Miralax , Milk of Magnesia, Colace , or Senna  can be used to keep you regular.      Remember that you can  always come back to the Emergency Department if you are not able to see your regular doctor in the amount of time listed above, if you get any new symptoms, or if there is anything that worries you.    We do not have all of your results back, as we discussed.

## 2020-03-15 ENCOUNTER — VIRTUAL VISIT (OUTPATIENT)
Dept: FAMILY MEDICINE | Facility: OTHER | Age: 30
End: 2020-03-15

## 2020-03-16 NOTE — PROGRESS NOTES
"Date: 03/15/2020 00:19:48  Clinician: Marivel Myrikc  Clinician NPI: [id number]  Patient: Henry Bautista  Patient : 1990  Patient Address: [address] [place] [address]  Patient Phone: [phone]  Visit Protocol: URI  Patient Summary:  Henry is a 30 year old ( : 1990 ) female who initiated a Visit for COVID-19 (Coronavirus) evaluation and screening. When asked the question \"Please sign me up to receive news, health information and promotions. \", Henry responded \"Yes\".    Henry states her symptoms started suddenly 3-6 days ago. After her symptoms started, they improved and then got worse again.   Her symptoms consist of malaise, rhinitis, myalgia, chills, wheezing, a sore throat, a cough, and nasal congestion. She is experiencing difficulty breathing due to nasal congestion but she is not short of breath. Henry also feels feverish.   Symptom details     Nasal secretions: The color of her mucus is blood-tinged.    Cough: Henry coughs every 5-10 minutes and her cough is more bothersome at night. Phlegm does not come into her throat when she coughs. She does not believe her cough is caused by post-nasal drip.     Sore throat: Henry reports having mild throat pain (1-3 on a 10 point pain scale), does not have exudate on her tonsils, and can swallow liquids. The lymph nodes in her neck are not enlarged. A rash has not appeared on the skin since the sore throat started.     Temperature: Her current temperature is 98 degrees Fahrenheit.     Wheezing: Henry has not ever been diagnosed with asthma. The wheezing interferes with her normal daily activities.     Henry denies having ear pain, enlarged lymph nodes, facial pain or pressure, teeth pain, and headache. She also denies taking antibiotic medication for the symptoms, having a sinus infection within the past year, and having recent facial or sinus surgery in the past 60 days.   Precipitating events  Within the past week, Henry has not been exposed to " someone with strep throat. She has not recently been exposed to someone with influenza. Henry has been in close contact with the following high risk individuals: children under the age of 5.   Pertinent COVID-19 (Coronavirus) information  Henry has not traveled internationally or to the areas where COVID-19 (Coronavirus) is widespread in the last 14 days before the start of her symptoms.   Henry has not had close contact with a suspected or laboratory-confirmed COVID-19 patient within 14 days of symptom onset.   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   Triage Point(s) temporarily suspended for COVID-19 (Coronavirus) screening  Henry reported the following symptoms which were previously protocol referral points. These protocol referral points have temporarily been removed for purposes of COVID-19 (Coronavirus) screening.   Wheezing that keeps Henry from doing daily activities   Pertinent medical history  Henry does not get yeast infections when she takes antibiotics.   Henry needs a return to work/school note.   Weight: 170 lbs   Henry does not smoke or use smokeless tobacco.   She denies pregnancy and denies breastfeeding. She is currently menstruating.   Weight: 170 lbs    MEDICATIONS: No current medications, ALLERGIES: NKDA  Clinician Response:  Dear Henry,    Diagnosis: Cough  Diagnosis ICD: R05

## 2020-05-05 ENCOUNTER — COMMUNICATION - HEALTHEAST (OUTPATIENT)
Dept: SCHEDULING | Facility: CLINIC | Age: 30
End: 2020-05-05

## 2020-09-17 ENCOUNTER — APPOINTMENT (OUTPATIENT)
Dept: ULTRASOUND IMAGING | Facility: CLINIC | Age: 30
End: 2020-09-17
Attending: EMERGENCY MEDICINE
Payer: COMMERCIAL

## 2020-09-17 ENCOUNTER — APPOINTMENT (OUTPATIENT)
Dept: GENERAL RADIOLOGY | Facility: CLINIC | Age: 30
End: 2020-09-17
Attending: EMERGENCY MEDICINE
Payer: COMMERCIAL

## 2020-09-17 ENCOUNTER — HOSPITAL ENCOUNTER (EMERGENCY)
Facility: CLINIC | Age: 30
Discharge: HOME | End: 2020-09-17
Attending: EMERGENCY MEDICINE | Admitting: EMERGENCY MEDICINE
Payer: COMMERCIAL

## 2020-09-17 VITALS
WEIGHT: 180 LBS | BODY MASS INDEX: 30.73 KG/M2 | SYSTOLIC BLOOD PRESSURE: 119 MMHG | HEIGHT: 64 IN | OXYGEN SATURATION: 100 % | TEMPERATURE: 98.8 F | DIASTOLIC BLOOD PRESSURE: 80 MMHG | RESPIRATION RATE: 16 BRPM | HEART RATE: 63 BPM

## 2020-09-17 DIAGNOSIS — R10.13 ABDOMINAL PAIN, EPIGASTRIC: ICD-10-CM

## 2020-09-17 LAB
ALBUMIN SERPL-MCNC: 3.4 G/DL (ref 3.4–5)
ALBUMIN UR-MCNC: NEGATIVE MG/DL
ALP SERPL-CCNC: 75 U/L (ref 40–150)
ALT SERPL W P-5'-P-CCNC: 23 U/L (ref 0–50)
ANION GAP SERPL CALCULATED.3IONS-SCNC: 3 MMOL/L (ref 3–14)
APPEARANCE UR: CLEAR
AST SERPL W P-5'-P-CCNC: 18 U/L (ref 0–45)
B-HCG FREE SERPL-ACNC: <5 IU/L
BASOPHILS # BLD AUTO: 0 10E9/L (ref 0–0.2)
BASOPHILS NFR BLD AUTO: 0.3 %
BILIRUB SERPL-MCNC: 0.2 MG/DL (ref 0.2–1.3)
BILIRUB UR QL STRIP: NEGATIVE
BUN SERPL-MCNC: 12 MG/DL (ref 7–30)
CALCIUM SERPL-MCNC: 9.4 MG/DL (ref 8.5–10.1)
CHLORIDE SERPL-SCNC: 106 MMOL/L (ref 94–109)
CO2 SERPL-SCNC: 28 MMOL/L (ref 20–32)
COLOR UR AUTO: ABNORMAL
CREAT SERPL-MCNC: 0.66 MG/DL (ref 0.52–1.04)
DIFFERENTIAL METHOD BLD: NORMAL
EOSINOPHIL # BLD AUTO: 0.2 10E9/L (ref 0–0.7)
EOSINOPHIL NFR BLD AUTO: 2.1 %
ERYTHROCYTE [DISTWIDTH] IN BLOOD BY AUTOMATED COUNT: 13.8 % (ref 10–15)
GFR SERPL CREATININE-BSD FRML MDRD: >90 ML/MIN/{1.73_M2}
GLUCOSE SERPL-MCNC: 94 MG/DL (ref 70–99)
GLUCOSE UR STRIP-MCNC: NEGATIVE MG/DL
HCT VFR BLD AUTO: 38.9 % (ref 35–47)
HGB BLD-MCNC: 12.4 G/DL (ref 11.7–15.7)
HGB UR QL STRIP: NEGATIVE
IMM GRANULOCYTES # BLD: 0 10E9/L (ref 0–0.4)
IMM GRANULOCYTES NFR BLD: 0.1 %
KETONES UR STRIP-MCNC: NEGATIVE MG/DL
LEUKOCYTE ESTERASE UR QL STRIP: ABNORMAL
LIPASE SERPL-CCNC: 75 U/L (ref 73–393)
LYMPHOCYTES # BLD AUTO: 2.8 10E9/L (ref 0.8–5.3)
LYMPHOCYTES NFR BLD AUTO: 36.7 %
MCH RBC QN AUTO: 28.3 PG (ref 26.5–33)
MCHC RBC AUTO-ENTMCNC: 31.9 G/DL (ref 31.5–36.5)
MCV RBC AUTO: 89 FL (ref 78–100)
MONOCYTES # BLD AUTO: 0.4 10E9/L (ref 0–1.3)
MONOCYTES NFR BLD AUTO: 4.7 %
MUCOUS THREADS #/AREA URNS LPF: PRESENT /LPF
NEUTROPHILS # BLD AUTO: 4.3 10E9/L (ref 1.6–8.3)
NEUTROPHILS NFR BLD AUTO: 56.1 %
NITRATE UR QL: NEGATIVE
NRBC # BLD AUTO: 0 10*3/UL
NRBC BLD AUTO-RTO: 0 /100
PH UR STRIP: 5.5 PH (ref 5–7)
PLATELET # BLD AUTO: 264 10E9/L (ref 150–450)
POTASSIUM SERPL-SCNC: 3.8 MMOL/L (ref 3.4–5.3)
PROT SERPL-MCNC: 8.6 G/DL (ref 6.8–8.8)
RBC # BLD AUTO: 4.38 10E12/L (ref 3.8–5.2)
RBC #/AREA URNS AUTO: 1 /HPF (ref 0–2)
SODIUM SERPL-SCNC: 137 MMOL/L (ref 133–144)
SOURCE: ABNORMAL
SP GR UR STRIP: 1.01 (ref 1–1.03)
SQUAMOUS #/AREA URNS AUTO: 1 /HPF (ref 0–1)
UROBILINOGEN UR STRIP-MCNC: NORMAL MG/DL (ref 0–2)
WBC # BLD AUTO: 7.7 10E9/L (ref 4–11)
WBC #/AREA URNS AUTO: 5 /HPF (ref 0–5)

## 2020-09-17 PROCEDURE — 80053 COMPREHEN METABOLIC PANEL: CPT | Performed by: EMERGENCY MEDICINE

## 2020-09-17 PROCEDURE — 96374 THER/PROPH/DIAG INJ IV PUSH: CPT

## 2020-09-17 PROCEDURE — 25000132 ZZH RX MED GY IP 250 OP 250 PS 637: Performed by: EMERGENCY MEDICINE

## 2020-09-17 PROCEDURE — 25000125 ZZHC RX 250: Performed by: EMERGENCY MEDICINE

## 2020-09-17 PROCEDURE — 81001 URINALYSIS AUTO W/SCOPE: CPT | Performed by: EMERGENCY MEDICINE

## 2020-09-17 PROCEDURE — 74019 RADEX ABDOMEN 2 VIEWS: CPT

## 2020-09-17 PROCEDURE — 99285 EMERGENCY DEPT VISIT HI MDM: CPT | Mod: 25

## 2020-09-17 PROCEDURE — 84702 CHORIONIC GONADOTROPIN TEST: CPT

## 2020-09-17 PROCEDURE — 76705 ECHO EXAM OF ABDOMEN: CPT

## 2020-09-17 PROCEDURE — 83690 ASSAY OF LIPASE: CPT | Performed by: EMERGENCY MEDICINE

## 2020-09-17 PROCEDURE — 85025 COMPLETE CBC W/AUTO DIFF WBC: CPT | Performed by: EMERGENCY MEDICINE

## 2020-09-17 RX ORDER — SUCRALFATE 1 G/1
1 TABLET ORAL 4 TIMES DAILY
Qty: 120 TABLET | Refills: 0 | Status: SHIPPED | OUTPATIENT
Start: 2020-09-17 | End: 2022-12-09

## 2020-09-17 RX ADMIN — FAMOTIDINE 20 MG: 10 INJECTION, SOLUTION INTRAVENOUS at 18:49

## 2020-09-17 RX ADMIN — LIDOCAINE HYDROCHLORIDE 30 ML: 20 SOLUTION ORAL; TOPICAL at 18:49

## 2020-09-17 ASSESSMENT — ENCOUNTER SYMPTOMS
BLOOD IN STOOL: 0
ABDOMINAL PAIN: 1
VOMITING: 0
FEVER: 0
ABDOMINAL DISTENTION: 1
CONSTIPATION: 1

## 2020-09-17 ASSESSMENT — MIFFLIN-ST. JEOR: SCORE: 1511.47

## 2020-09-17 NOTE — ED AVS SNAPSHOT
Emergency Department  [address] [place] [address]  Phone:  [phone]  Fax:  [phone]                                    Henry Bautista   MRN: [id number]    Department:   Emergency Department   Date of Visit:  9/17/2020           After Visit Summary Signature Page    I have received my discharge instructions, and my questions have been answered. I have discussed any challenges I see with this plan with the nurse or doctor.    ..........................................................................................................................................  Patient/Patient Representative Signature      ..........................................................................................................................................  Patient Representative Print Name and Relationship to Patient    ..................................................               ................................................  Date                                   Time    ..........................................................................................................................................  Reviewed by Signature/Title    ...................................................              ..............................................  Date                                               Time          22EPIC Rev 08/18

## 2020-09-17 NOTE — ED PROVIDER NOTES
History     Chief Complaint:  Abdominal Pain    HPI   Henry Bautista is a 30 year old female who presents with abdominal pain. The patient reports that she has been experiencing intermittent generalized abdominal pain for awhile now and it will last for up to a week at a time. She notes that however she has had an episode of pain now for 2 weeks that is not resolving and worsening. The patient endorses associated abdominal distention and constipation and hard stool. She notes that the pain will be somewhat better after eating. She denies fever, vomiting, urinary symptoms, or blood in stool. She has not taken frequent amounts of aspirin or ibuprofen and denies history of ulcers, H. Pylori, or abdominal surgies. She denies chance of pregnancy and her last pregnancy was 2.5 years ago.      Allergies:  No known drug allergies.       Medications:    No current medications.     Past Medical History:    Medical history reviewed. No pertinent medical history.      Past Surgical History:    Past surgical history reviewed. No pertinent past surgical history.     Family History:    Family history reviewed. No pertinent family history.     Social History:  The patient was unaccompanied to the ED.  Smoking Status: Never Smoker  Smokeless Tobacco: Never Used  Alcohol Use: Negative   Drug Use: Negative    Review of Systems   Constitutional: Negative for fever.   Gastrointestinal: Positive for abdominal distention, abdominal pain and constipation. Negative for blood in stool and vomiting.   Genitourinary:        No urinary symptoms   All other systems reviewed and are negative.    Physical Exam     Patient Vitals for the past 24 hrs:   BP Temp Temp src Pulse Resp SpO2 Height Weight   09/17/20 1730 -- -- -- -- -- 100 % -- --   09/17/20 1725 -- -- -- -- -- 100 % -- --   09/17/20 1720 -- -- -- -- -- 100 % -- --   09/17/20 1700 -- -- -- 63 -- 100 % -- --   09/17/20 1630 120/80 -- -- 68 16 100 % -- --   09/17/20 1504 134/82 98.8  F (37.1  " C) Temporal 68 16 100 % 1.626 m (5' 4\") 81.6 kg (180 lb)      Physical Exam  General: Well-nourished, appears comfortable   Eyes: PERRL, conjunctivae pink no scleral icterus or conjunctival injection  ENT:  Moist mucus membranes, posterior oropharynx clear without erythema or exudates  Respiratory:  Lungs clear to auscultation bilaterally, no crackles/rubs/wheezes.  Good air movement  CV: Normal rate and rhythm, no murmurs/rubs/gallops  GI:  Abdomen soft and non-distended.  Normoactive BS.  +epigastrictenderness, no guarding or rebound  Skin: Warm, dry.  No rashes or petechiae  Musculoskeletal: No peripheral edema or calf tenderness  Neuro: Alert and oriented to person/place/time  Psychiatric: Normal affect      Emergency Department Course     Imaging:  Radiology findings were communicated with the patient who voiced understanding of the findings.    Abdomen XR, 2 vw, flat and upright  Flat and upright views of the abdomen. Bowel gas pattern  is unremarkable. No free intraperitoneal air. No abnormal  calcifications are evident.  JAVON CARDOZO MD  Reading per radiology    US Abdomen Limited  1.  Normal limited abdominal ultrasound. No gallstones or bile duct  dilatation.  JAVON CARDOZO MD  Reading per radiology       Laboratory:  Laboratory findings were communicated with the patient who voiced understanding of the findings.    UA: Leukocyte Esterase small (A), Mucous present (A), o/w WNL.     CBC: WBC 7.7, HGB 12.4,   CMP: WNL (Creatinine 0.66)    Lipase: 75     ISTAT HCG Quantitative: <5.0     Interventions:  1849 GI Cocktail 30 mL PO  1849 Pepcid 20 mg IV    Emergency Department Course:    1550 Nursing notes and vitals reviewed. I performed an exam of the patient as documented above.     1609 The patient provided a urine sample here in the emergency department. This was sent for laboratory testing, findings above.     1631 IV was inserted and blood was drawn for laboratory testing, results above.     1732 The " patient was sent for a US while in the emergency department, results above.      1838 The patient was sent for a XR while in the emergency department, results above.      1915 Prior to discharge, I personally reviewed the results with the patient and all related questions were answered. The patient verbalized understanding and is amenable to plan.     Impression & Plan      Medical Decision Making:  Henry Bautista is a 30 year old female who presents with abdominal pain.  A broad differential diagnosis was considered including PUD vs gastritis vs pancreatitis vs perforated viscus vs bilary colic vs obstruction vs ruptured ectopic pregnancy.  Lipase is normal so I doubt pancreatitis.  No elevated of biliary or liver enzymes or signs of cholelithiasis or cholecystitis on RUQ US so I doubt biliary colic.  Pregnancy test is negative. Abdominal XR shows no signs of intrabdominal free air or obstruction.  I suspect her symptoms are due to PUD vs gastritis. The workup in the ED is at this point negative.  No etiology for the patients pain is found at this point and my suspicion of an intraabdominal catastrophe or other worrisome etiology is very low.  I will not therefore admit for serial exams and further workup.  Patient is hemodynamically stable in ED. Plan is home with 12 hour abdominal pain recheck by primary care physician or return to ED at that time.  Return for fevers greater than 102, increasing pain, other new symptoms develop.  Abdominal pain handout given.  Questions were answered.       Diagnosis:    ICD-10-CM    1. Abdominal pain, epigastric  R10.13      Disposition:   The patient is discharged to home.     Discharge Medications:  New Prescriptions    OMEPRAZOLE (PRILOSEC) 20 MG DR CAPSULE    Take 1 capsule (20 mg) by mouth 2 times daily for 14 days    SUCRALFATE (CARAFATE) 1 GM TABLET    Take 1 tablet (1 g) by mouth 4 times daily     Scribe Disclosure:  I, Orla Severson, am serving as a scribe at 3:53 PM on  9/17/2020 to document services personally performed by Shu Maloney MD based on my observations and the provider's statements to me.    EMERGENCY DEPARTMENT       Shu Maloney MD  09/19/20 9913

## 2020-09-17 NOTE — ED TRIAGE NOTES
Intermittent generalized abdominal pain over past 8 months. Worse in last 2 weeks. States pain increases with palpation. No N/V/D.

## 2020-09-18 NOTE — DISCHARGE INSTRUCTIONS
*Get plenty of rest and avoid strenuous activities.  *Take medications as prescribed.  Avoid NSAIDs and spicy foods.  Omeprazole and carafate as directed.  *Follow-up with your doctor for a recheck within 12-36 hours. Recommend follow-up with gastroenterology in the next 1-2 weeks.  *Return to the ER if you develop fever, worsening pain, pain that moves to the right lower abdomen, faint or feel like you will faint or become worse in any way.    Discharge Instructions  Abdominal Pain    Abdominal pain can be caused by many things. Your evaluation today does not show the exact cause for your pain. Your doctor today has decided that it is unlikely your pain is due to a life threatening problem, or a problem requiring surgery or hospital admission. Sometimes those problems cannot be found right away, so it is very important that you follow up as directed.  Sometimes only the changes which occur over time allow the cause of your pain to be found.    Return to the Emergency Department for a recheck in 8-12 hours if your pain continues.  If your pain gets worse, changes in location, or feels different, return to the Emergency Department right away.    ADULTS:  Return to the Emergency Department right away if:    You get an oral temperature above 102oF or as directed by your doctor.  You have blood in your stools (bright red or black, tarry stools).  You keep throwing up or can t drink liquids.  You see blood when you throw up.  You can t have a bowel movement or you can t pass gas.  Your stomach gets bloated or bigger.  Your skin or the whites of your eyes look yellow.  You faint.  You have bloody, frequent or painful urination.  You have new symptoms or anything that worries you.    CHILDREN:  Return to the Emergency Department right away if your child has any of the above-listed symptoms or the following:    Pushes your hand away or screams/cries when his/her belly is touched.  You notice your child is very fussy or  weak.  Your child is very tired and is too tired to eat or drink.  Your child is dehydrated.  Signs of dehydration can be:  Your infant has had no wet diapers in 4-5 hours.  Your older child has not passed urine in 6-8 hours.  Your infant or child starts to have dry mouth and lips, or no saliva or tears.    PREGNANT WOMEN:  Return to the Emergency Department right away if you have any of the above-listed symptoms or the following:    You have bleeding, leaking fluid or passing tissue from the vagina.  You have worse pain or cramping, or pain in your shoulder or back.  You have vomiting that will not stop.  You have painful or bloody urination.  You have a temperature of 100oF or more.  Your baby is not moving as much as usual.  You faint.  You get a bad headache with or without eye problems and abdominal pain.  You have a convulsion or seizure.  You have unusual discharge from your vagina and abdominal pain.    Abdominal pain is pretty common during pregnancy.  Your pain may or may not be related to your pregnancy. You should follow-up closely with your OB doctor so they can evaluate you and your baby.  Until you follow-up with your regular doctor, do the following:     Avoid sex and do not put anything in your vagina.  Drink clear fluids.  Only take medications approved by your doctor.    MORE INFORMATION:    Appendicitis:  A possible cause of abdominal pain in any person who still has their appendix is acute appendicitis. Appendicitis is often hard to diagnose.  Testing does not always rule out early appendicitis or other causes of abdominal pain. Close follow-up with your doctor and re-evaluations may be needed to figure out the reason for your abdominal pain.    Follow-up:  It is very important that you make an appointment with your clinic and go to the appointment.  If you do not follow-up with your primary doctor, it may result in missing an important development which could result in permanent injury or  "disability and/or lasting pain.  If there is any problem keeping your appointment, call your doctor or return to the Emergency Department.    Medications:  Take your medications as directed by your doctor today.  Before using over-the-counter medications, ask your doctor and make sure to take the medications as directed.  If you have any questions about medications, ask your doctor.    Diet:  Resume your normal diet as much as possible, but do not eat fried, fatty or spicy foods while you have pain.  Do not drink alcohol or have caffeine.  Do not smoke tobacco.    Probiotics: If you have been given an antibiotic, you may want to also take a probiotic pill or eat yogurt with live cultures. Probiotics have \"good bacteria\" to help your intestines stay healthy. Studies have shown that probiotics help prevent diarrhea and other intestine problems (including C. diff infection) when you take antibiotics. You can buy these without a prescription in the pharmacy section of the store.     If you were given a prescription for medicine here today, be sure to read all of the information (including the package insert) that comes with your prescription.  This will include important information about the medicine, its side effects, and any warnings that you need to know about.  The pharmacist who fills the prescription can provide more information and answer questions you may have about the medicine.  If you have questions or concerns that the pharmacist cannot address, please call or return to the Emergency Department.         Opioid Medication Information    Pain medications are among the most commonly prescribed medicines, so we are including this information for all our patients. If you did not receive pain medication or get a prescription for pain medicine, you can ignore it.     You may have been given a prescription for an opioid (narcotic) pain medicine and/or have received a pain medicine while here in the Emergency " Department. These medicines can make you drowsy or impaired. You must not drive, operate dangerous equipment, or engage in any other dangerous activities while taking these medications. If you drive while taking these medications, you could be arrested for DUI, or driving under the influence. Do not drink any alcohol while you are taking these medications.     Opioid pain medications can cause addiction. If you have a history of chemical dependency of any type, you are at a higher risk of becoming addicted to pain medications.  Only take these prescribed medications to treat your pain when all other options have been tried. Take it for as short a time and as few doses as possible. Store your pain pills in a secure place, as they are frequently stolen and provide a dangerous opportunity for children or visitors in your house to start abusing these powerful medications. We will not replace any lost or stolen medicine.  As soon as your pain is better, you should flush all your remaining medication.     Many prescription pain medications contain Tylenol  (acetaminophen), including Vicodin , Tylenol #3 , Norco , Lortab , and Percocet .  You should not take any extra pills of Tylenol  if you are using these prescription medications or you can get very sick.  Do not ever take more than 3000 mg of acetaminophen in any 24 hour period.    All opioids tend to cause constipation. Drink plenty of water and eat foods that have a lot of fiber, such as fruits, vegetables, prune juice, apple juice and high fiber cereal.  Take a laxative if you don t move your bowels at least every other day. Miralax , Milk of Magnesia, Colace , or Senna  can be used to keep you regular.      Remember that you can always come back to the Emergency Department if you are not able to see your regular doctor in the amount of time listed above, if you get any new symptoms, or if there is anything that worries you.

## 2020-09-22 ENCOUNTER — TELEPHONE (OUTPATIENT)
Dept: GASTROENTEROLOGY | Facility: CLINIC | Age: 30
End: 2020-09-22

## 2020-09-22 NOTE — TELEPHONE ENCOUNTER
[facility] Call Center    Phone Message    May a detailed message be left on voicemail: yes     Reason for Call: Other: Patient was seen in ER, on 9/17/20, for abdominal pain - epigastric.  Patient called to make a follow up appt today; however, she does not want a virtual appt - she wants to be seen in person.  Please follow up with patient.  Thank you!     Action Taken: Message routed to:  [facility]    Travel Screening: Not Applicable

## 2020-09-22 NOTE — TELEPHONE ENCOUNTER
Patient wants to see Dr. Knowles  Patient given to hepatology  Attempted to clarify patient where the pain is  Pt informed that Dr. Knowles specializes in disorders of the liver   Pt will all hepatology.

## 2020-09-24 NOTE — TELEPHONE ENCOUNTER
RECORDS RECEIVED FROM: N/A   DATE RECEIVED: 10/27/2020   NOTES STATUS DETAILS   OFFICE NOTE from referring provider N/A    OFFICE NOTE from other specialist Internal 9/17/2020 ([facility])    DISCHARGE SUMMARY from hospital N/A    OPERATIVE REPORT N/A    MEDICATION LIST Internal         ENDOSCOPY  N/A    COLONOSCOPY N/A    ERCP N/A    EUS N/A    STOOL TESTING N/A    PERTINENT LABS Internal    PATHOLOGY REPORTS (RELATED) N/A    IMAGING (CT, MRI, EGD) Internal XR Abdomen: 9/17/2020  US Abdomen: 9/17/2020     REFERRAL INFORMATION    Date referral was placed: 10/27/2020   Date all records received:    Date records were scanned into EPIC:    Date records were sent to Provider to review:    Date and recommendation received from provider:  LETTER SENT  SCHEDULE APPOINTMENT   Date patient was contacted to schedule:

## 2020-10-27 ENCOUNTER — PRE VISIT (OUTPATIENT)
Dept: GASTROENTEROLOGY | Facility: CLINIC | Age: 30
End: 2020-10-27

## 2021-06-07 NOTE — TELEPHONE ENCOUNTER
Patient calls today stating she is having COVID-19 symptoms and would like to schedule an appt with her doctor to get tested.    I am unable to view patient's chart under the [facility] system. She states she is usually seen by [facility]. I transferred patient over to the [facility] line so they may set her up with appt with her PCP if deemed necessary.    Eugenia Whalen RN

## 2021-07-10 ENCOUNTER — HOSPITAL ENCOUNTER (EMERGENCY)
Facility: CLINIC | Age: 31
Discharge: HOME | End: 2021-07-10
Attending: PHYSICIAN ASSISTANT | Admitting: PHYSICIAN ASSISTANT
Payer: COMMERCIAL

## 2021-07-10 VITALS
OXYGEN SATURATION: 100 % | TEMPERATURE: 98 F | DIASTOLIC BLOOD PRESSURE: 68 MMHG | HEART RATE: 79 BPM | RESPIRATION RATE: 17 BRPM | SYSTOLIC BLOOD PRESSURE: 123 MMHG

## 2021-07-10 DIAGNOSIS — H60.391 INFECTIVE OTITIS EXTERNA, RIGHT: ICD-10-CM

## 2021-07-10 DIAGNOSIS — J06.9 URI (UPPER RESPIRATORY INFECTION): ICD-10-CM

## 2021-07-10 LAB
LABORATORY COMMENT REPORT: NORMAL
SARS-COV-2 RNA RESP QL NAA+PROBE: NEGATIVE
SPECIMEN SOURCE: NORMAL

## 2021-07-10 PROCEDURE — 99283 EMERGENCY DEPT VISIT LOW MDM: CPT

## 2021-07-10 PROCEDURE — C9803 HOPD COVID-19 SPEC COLLECT: HCPCS

## 2021-07-10 PROCEDURE — 87635 SARS-COV-2 COVID-19 AMP PRB: CPT | Performed by: PHYSICIAN ASSISTANT

## 2021-07-10 RX ORDER — NEOMYCIN SULFATE, POLYMYXIN B SULFATE, HYDROCORTISONE 3.5; 10000; 1 MG/ML; [USP'U]/ML; MG/ML
4 SOLUTION/ DROPS AURICULAR (OTIC) 4 TIMES DAILY
Qty: 10 ML | Refills: 0 | Status: SHIPPED | OUTPATIENT
Start: 2021-07-10 | End: 2021-07-17

## 2021-07-10 ASSESSMENT — ENCOUNTER SYMPTOMS
COUGH: 1
SHORTNESS OF BREATH: 1
SORE THROAT: 1

## 2021-07-10 NOTE — DISCHARGE INSTRUCTIONS
For pain, you can take up to 1000 mg or 1 g of Tylenol.  You can take 600 mg of ibuprofen at one time.  You can alternate these medications every 3 hours.  Always take ibuprofen with food.  Never take more than 4 g (4000 mg) of Tylenol or 3200mg of ibuprofen in one day.  Do not take this amount for more than 1 week at a time.     Take full course of antibiotic.    I will call you if your COVID swab returns positive.

## 2021-07-10 NOTE — ED PROVIDER NOTES
History   Chief Complaint:  Otalgia and Pharyngitis     The history is provided by the patient.      Henry Bautista is a 31 year old female who presents with otalgia and pharyngitis. The patient explains that she has had right sided ear pain for the last three days that is accompanied with a sore throat, shortness of breath, cough, and congestion. She has not gotten a COVID-19 vaccine and has not taken any at home medications. She had some relatives that were sick but they are fully recovered now.     Review of Systems   HENT: Positive for congestion, ear pain and sore throat.    Respiratory: Positive for cough and shortness of breath.    All other systems reviewed and are negative.    Allergies:  No Known Allergies    Medications:  Carafate    Past Medical History:    The patient did not report any past medical history     Social History:  Presents alone  The patient has not gotten a COVID-19 vaccine     Physical Exam     Patient Vitals for the past 24 hrs:   BP Temp Temp src Pulse Resp SpO2   07/10/21 1358 123/68 98  F (36.7  C) Temporal 79 17 100 %       Physical Exam  Constitutional: Pleasant. Cooperative.   Eyes: Pupils equally round and reactive  HENT: Head is normal in appearance. TMs normal. R canal mildly edematous, tenderness when pulling on helix. L canal normal. Moist mucous membranes. No oropharyngeal erythema. No exudates. No palatal asymmetry. Uvula midline. No trismus. No muffled voice. Tolerating their secretions.  Cardiovascular: Regular rate and rhythm.  Respiratory: Normal respiratory effort, lungs are clear bilaterally.  Neck: Normal ROM. No preauricular, postauricular, tonsillar, submandibular, submental, or cervical lymphadenopathy.   Skin: Normal, without rash.  Neurologic: Cranial nerves grossly intact. Alert.  Nursing notes and vital signs reviewed.    Emergency Department Course     Labs:  COVID: Pending    Emergency Department Course:    Reviewed:  I reviewed nursing notes, vitals and past  medical history    Assessments:  1451 I obtained history and examined the patient as noted above.     Disposition:  The patient was discharged to home.       Impression & Plan     Medical Decision Making:  Henry Bautista is a 31 year old female who presents for evaluation of otalgia on the right side.  The patient has an exam consistent with otitis externa.  Differential considered in this patient with otalgia included mastoiditis, meningitis, perforation, cerumen impaction, mass, dental abscess, peritonsillar abscess, referred pain, cholesteatoma, otitis externa, etc.  She may take Tylenol or Ibuprofen for pain.  Drops for the externa are noted below. Return if increasing pain, fever, decrease in hearing or ear discharge.  Follow-up with primary physician if symptoms persist and ENT consultation may be needed as outpatient. Did test for COVID. This is pending. Patient discharged to home in stable condition.    Covid-19  Henry Bautista was evaluated during a global COVID-19 pandemic, which necessitated consideration that the patient might be at risk for infection with the SARS-CoV-2 virus that causes COVID-19.   Applicable protocols for evaluation were followed during the patient's care.   COVID-19 was considered as part of the patient's evaluation. The plan for testing is:  a test was obtained during this visit.    Diagnosis:    ICD-10-CM    1. Infective otitis externa, right  H60.391 Symptomatic SARS-CoV-2 COVID-19 Virus (Coronavirus) by PCR   2. URI (upper respiratory infection)  J06.9        Discharge Medications:  Discharge Medication List as of 7/10/2021  3:07 PM      START taking these medications    Details   neomycin-polymyxin-hydrocortisone (CORTISPORIN) [id number] otic solution Place 4 drops into the right ear 4 times daily for 7 days, Disp-10 mL, R-0, Local Print             Scribe Disclosure:  Juliette MITTAL, am serving as a scribe at 2:51 PM on 7/10/2021 to document services personally performed by Alejandro Duke,  MICKEY based on my observations and the provider's statements to me.     This record was created at least in part using electronic voice recognition software, so please excuse any typographical errors.       Alejandro Duke PA-C  07/10/21 9028

## 2021-10-20 ENCOUNTER — HOSPITAL ENCOUNTER (EMERGENCY)
Facility: CLINIC | Age: 31
Discharge: HOME | End: 2021-10-20
Attending: PHYSICIAN ASSISTANT | Admitting: PHYSICIAN ASSISTANT
Payer: COMMERCIAL

## 2021-10-20 ENCOUNTER — APPOINTMENT (OUTPATIENT)
Dept: GENERAL RADIOLOGY | Facility: CLINIC | Age: 31
End: 2021-10-20
Attending: PHYSICIAN ASSISTANT
Payer: COMMERCIAL

## 2021-10-20 VITALS
RESPIRATION RATE: 14 BRPM | HEIGHT: 65 IN | BODY MASS INDEX: 33.32 KG/M2 | DIASTOLIC BLOOD PRESSURE: 51 MMHG | HEART RATE: 82 BPM | SYSTOLIC BLOOD PRESSURE: 120 MMHG | TEMPERATURE: 99.1 F | WEIGHT: 200 LBS | OXYGEN SATURATION: 99 %

## 2021-10-20 DIAGNOSIS — J02.9 ACUTE PHARYNGITIS, UNSPECIFIED ETIOLOGY: ICD-10-CM

## 2021-10-20 DIAGNOSIS — R05.9 COUGH: ICD-10-CM

## 2021-10-20 LAB
ATRIAL RATE - MUSE: 85 BPM
DEPRECATED S PYO AG THROAT QL EIA: NEGATIVE
DIASTOLIC BLOOD PRESSURE - MUSE: NORMAL MMHG
FLUAV RNA SPEC QL NAA+PROBE: NEGATIVE
FLUBV RNA RESP QL NAA+PROBE: NEGATIVE
GROUP A STREP BY PCR: NOT DETECTED
HCG UR QL: NEGATIVE
INTERPRETATION ECG - MUSE: NORMAL
P AXIS - MUSE: 49 DEGREES
PR INTERVAL - MUSE: 166 MS
QRS DURATION - MUSE: 84 MS
QT - MUSE: 344 MS
QTC - MUSE: 409 MS
R AXIS - MUSE: 27 DEGREES
RSV RNA SPEC NAA+PROBE: NEGATIVE
SARS-COV-2 RNA RESP QL NAA+PROBE: NEGATIVE
SYSTOLIC BLOOD PRESSURE - MUSE: NORMAL MMHG
T AXIS - MUSE: 25 DEGREES
VENTRICULAR RATE- MUSE: 85 BPM

## 2021-10-20 PROCEDURE — 81025 URINE PREGNANCY TEST: CPT | Performed by: PHYSICIAN ASSISTANT

## 2021-10-20 PROCEDURE — 87637 SARSCOV2&INF A&B&RSV AMP PRB: CPT | Performed by: PHYSICIAN ASSISTANT

## 2021-10-20 PROCEDURE — C9803 HOPD COVID-19 SPEC COLLECT: HCPCS

## 2021-10-20 PROCEDURE — 93005 ELECTROCARDIOGRAM TRACING: CPT

## 2021-10-20 PROCEDURE — 71045 X-RAY EXAM CHEST 1 VIEW: CPT

## 2021-10-20 PROCEDURE — 87651 STREP A DNA AMP PROBE: CPT | Performed by: PHYSICIAN ASSISTANT

## 2021-10-20 PROCEDURE — 99285 EMERGENCY DEPT VISIT HI MDM: CPT | Mod: 25

## 2021-10-20 RX ORDER — BENZONATATE 100 MG/1
100 CAPSULE ORAL 3 TIMES DAILY PRN
Qty: 15 CAPSULE | Refills: 0 | Status: SHIPPED | OUTPATIENT
Start: 2021-10-20 | End: 2022-12-09

## 2021-10-20 ASSESSMENT — MIFFLIN-ST. JEOR: SCORE: 1613.07

## 2021-10-20 ASSESSMENT — ENCOUNTER SYMPTOMS
SORE THROAT: 1
COUGH: 1

## 2021-10-20 NOTE — ED PROVIDER NOTES
"  History     Chief Complaint:  Cough and Pharyngitis       HPI   Henry Bautista is a 31 year old female, partially vaccinated with one Moderna shot against COVID, who presents to the ED for evaluation of cough and pharyngitis.  Patient reports onset of pharyngitis that began approximately 9 days ago.  She subsequently developed cough about 5 days ago, and notes that she has had intermittent subjective fever as well, however has not measured her temperature.  She notes some chest tightness at this time associated with her cough.  She denies any dyspnea, abdominal pain, nausea, vomiting, diarrhea, urinary symptoms, or leg swelling.     ROS:  Review of Systems   HENT: Positive for sore throat.    Respiratory: Positive for cough.    Cardiovascular: Positive for chest pain.   All other systems reviewed and are negative.       Allergies:  No Known Allergies     Medications:    Denies    Past Medical History:    TB  Seasonal allergies     Social History:   reports that she has never smoked. She does not have any smokeless tobacco history on file. She reports previous alcohol use. She reports that she does not use drugs.      Physical Exam     Patient Vitals for the past 24 hrs:   BP Temp Temp src Pulse Resp SpO2 Height Weight   10/20/21 2002 120/51 -- -- 82 14 99 % -- --   10/20/21 1702 131/81 99.1  F (37.3  C) Oral 94 14 98 % 1.651 m (5' 5\") 90.7 kg (200 lb)        Physical Exam  Constitutional: Pleasant. Cooperative.   Eyes: Pupils equally round and reactive  HENT: Head is normal in appearance. Mildly erythematous posterior oropharynx. Uvula midline.   Cardiovascular: Regular rate and rhythm and without murmurs.  Respiratory: Coarse breath sounds bilaterally. Intermittently coughing.  Musculoskeletal: No asymmetry of the lower extremities, no tenderness to palpation.   Skin: Normal, without rash.  Neurologic: Cranial nerves grossly intact, normal cognition, no focal deficits. Alert and oriented x 3.   Psychiatric: Normal " affect.  Nursing notes and vital signs reviewed.      Emergency Department Course     ECG  @ 1707  Vent. Rate 85 bpm. AK interval 166 ms. QRS duration 84 ms. QT/QTc 344/409 ms. P-R-T axis 49 27 25.   NSR.    Imaging:  XR Chest Port 1 View   Final Result   IMPRESSION:       Single frontal image was acquired with caudal beam angulation.       No acute lung or pleural space process.         Laboratory:  Labs Ordered and Resulted from Time of ED Arrival Up to the Time of Departure from the ED   HCG QUALITATIVE URINE - Normal   STREPTOCOCCUS A RAPID SCREEN W REFELX TO PCR - Normal   INFLUENZA A/B & SARS-COV2 PCR MULTIPLEX   GROUP A STREPTOCOCCUS PCR THROAT SWAB        Emergency Department Course:  Reviewed:  I reviewed nursing notes, vitals and past medical history    PERC Rule for risk stratifying PE to low risk (calculator)  Background  Risk stratifies patients to low risk of PE if all 8 criteria are present including age <50, heart rate <100, O2 Sat >94%, no unilateral leg edema, no hemoptysis, no recent surgery or trauma, no prior VTE, and no hormone use.  Data  31 year old  does not have a problem list on file.  @cmedp@   has no past surgical history on file.  Pulse: 94  SpO2: 98 %  Criteria   Of  8 possible items (all criteria must be present):  Age <50 years  Heart rate <100 bpm  Oxygen Saturation >94%  No unilateral leg swelling  No hemoptysis  No surgery or trauma within 4 weeks  No prior DVT or PE  No hormone use (oral, transdermal and intravaginal estrogens)  Interpretation  All eight criteria are met AND low clinical PE suspicion: No further evaluation for PE required    Assessments:   I obtained history and examined the patient as noted above.    I rechecked the patient and explained findings.     Disposition:  The patient was discharged to home.     Impression & Plan      Covid-19  Henry Bautista was evaluated during a global COVID-19 pandemic, which necessitated consideration that the patient might be at risk  for infection with the SARS-CoV-2 virus that causes COVID-19.   Applicable protocols for evaluation were followed during the patient's care.   COVID-19 was considered as part of the patient's evaluation. The plan for testing is:  a test was obtained during this visit.    Medical Decision Making:  Henry Bautista is a 31 year old female who presents to the ED for evaluation of cough and pharyngitis.  This is in the setting of daughter who has strep throat.  See HPI as above for additional details.  Vitals and physical exam as above.  Differential was broad and included pneumonia, strep pharyngitis, viral pharyngitis, epiglottitis, PTA, deep space neck infection, among others.  Work-up obtained as above.  Patient is negative for strep.  Chest x-ray negative for pneumonia.  As patient did note some chest tightness, ECG was obtained in triage prior to my evaluation.  Suspect that this is related to patient's cough, have very low suspicion for ACS, and ECG is reassuring.  Did consider PE in the setting, patient is PERC negative.  Suspect viral URI at this time.  No evidence for nefarious pathology such as epiglottitis or PTA or deep space nasal neck infection as patient is overall well-appearing and tolerating secretions appropriately.  We will send patient home with Tessalon Perles for cough suppression, advised Tylenol and ibuprofen for pharyngitis.  Follow-up with PCP with persistent symptoms. Discussed reasons to return. All questions answered. Patient discharged to home in stable condition.    Diagnosis:    ICD-10-CM    1. Cough  R05.9    2. Acute pharyngitis, unspecified etiology  J02.9         Discharge Medications:  New Prescriptions    BENZONATATE (TESSALON) 100 MG CAPSULE    Take 1 capsule (100 mg) by mouth 3 times daily as needed for cough        10/20/2021   Alejandro Duke PA-C     This record was created at least in part using electronic voice recognition software, so please excuse any typographical errors.          Alejandro Duke PA-C  10/20/21 2027

## 2021-10-20 NOTE — ED TRIAGE NOTES
2 weeks of cough, SOB and sore throat. Daughter recently had strep throat. Unknown if she is pregnant.

## 2021-10-26 ENCOUNTER — TELEPHONE (OUTPATIENT)
Dept: EMERGENCY MEDICINE | Facility: CLINIC | Age: 31
End: 2021-10-26

## 2021-10-26 NOTE — TELEPHONE ENCOUNTER
Coronavirus (COVID-19) Notification    Lab Result   Lab test 2019-nCoV rRt-PCR OR SARS-COV-2 PCR    Nasopharyngeal AND/OR Oropharyngeal swab is NEGATIVE for 2019-nCoV RNA [OR] SARS-COV-2 RNA (COVID-19) RNA    Your result was negative. This means that we didn't find the virus that causes COVID-19 in your sample. A test may show negative when you do actually have the virus. This can happen when the virus is in the early stages of infection, before you feel illness symptoms.    If you have symptoms   Stay home and away from others (self-isolate) until you meet ALL of the guidelines below:    You've had no fever--and no medicine that reduces fever--for 1 full day (24 hours). And      Your other symptoms have gotten better. For example, your cough or breathing has improved. And   At least 10 days have passed since your symptoms started.       Group A Streptococcus PCR is NEGATIVE  No treatment or change in treatment [facility] ED lab result Strep Group A protocol.    Miriam Tello RN    Symptomatic Influenza A/B & SARS-CoV2 (COVID-19) Virus PCR Multiplex Nasopharyngeal  Order: [id number]  Status:  Final result   Visible to patient:  No (inaccessible in MyChart)  Specimen Information: Nasopharyngeal; Swab         0 Result Notes    Ref Range & Units 6 d ago    Influenza A target Negative Negative     Influenza B target Negative Negative     RSV target Negative Negative     SARS CoV2 PCR Negative Negative    Comment: NEGATIVE: SARS-CoV-2 (COVID-19) RNA not detected, presumed negative.   Resulting Agency  [facility]        Narrative  Performed by: [facility]  Testing was performed using the Xpert Xpress CoV2/Flu/RSV Assay on the Cepheid GeneXpert Instrument. This test should be ordered for the detection of SARS-CoV-2 and influenza viruses in individuals who meet clinical and/or epidemiological criteria. Test performance is unknown in asymptomatic patients. This test is for in vitro diagnostic use under the FDA EUA for  laboratories certified under CLIA to perform high or moderate complexity testing. This test has not been FDA cleared or approved. A negative result does not rule out the presence of PCR inhibitors in the specimen or target RNA in concentration below the limit of detection for the assay. If only one viral target is positive but coinfection with multiple targets is suspected, the sample should be re-tested with another FDA cleared, approved, or authorized test, if coinfection would change clinical management. This test was validated by the [facility] [facility]. These laboratories are certified under the Clinical   Laboratory Improvement Amendments of 1988 (CLIA-88) as qualified to perform high complexity laboratory testing.      Specimen Collected: 10/20/21  5:14 PM Last Resulted: 10/20/21 10:15 PM               HCG qualitative urine  Order: [id number]  Status:  Final result   Visible to patient:  No (inaccessible in MyChart)   0 Result Notes    Ref Range & Units 6 d ago    hCG Urine Qualitative Negative Negative    Comment: This test is for screening purposes.  Results should be interpreted along with the clinical picture.  Confirmation testing is available if warranted by ordering [id number], HCG Quantitative Pregnancy.   Resulting Agency  [facility]         Specimen Collected: 10/20/21  6:10 PM Last Resulted: 10/20/21  6:20 PM

## 2022-02-27 ENCOUNTER — NURSE TRIAGE (OUTPATIENT)
Dept: NURSING | Facility: CLINIC | Age: 32
End: 2022-02-27
Payer: COMMERCIAL

## 2022-02-27 NOTE — TELEPHONE ENCOUNTER
Triage call    Patient calling to report she has had a sore throat for 8 days she feels that here is a lump inside her throat.  She has difficulty eating and drinking.    Per protocol see PCP in 24 hours.  She wants to go to the urgent care to be checked.Care advice given.  Verbalizes understanding and agrees with plan.    Rashida Stroud RN   [facility] Nurse Advisor  12:13 PM 2/27/2022    COVID 19 Nurse Triage Plan/Patient Instructions    Please be aware that novel coronavirus (COVID-19) may be circulating in the community. If you develop symptoms such as fever, cough, or SOB or if you have concerns about the presence of another infection including coronavirus (COVID-19), please contact your health care provider or visit [email or web address].     Disposition/Instructions    In-Person Visit with provider recommended. Reference Visit Selection Guide.    Thank you for taking steps to prevent the spread of this virus.  o Limit your contact with others.  o Wear a simple mask to cover your cough.  o Wash your hands well and often.    Resources    [facility]: About COVID-19: [email or web address]    CDC: What to Do If You're Sick: www.cdc.gov/coronavirus/2019-ncov/about/steps-when-sick.html    CDC: Ending Home Isolation: www.cdc.gov/coronavirus/2019-ncov/hcp/disposition-in-home-patients.html     CDC: Caring for Someone: www.cdc.gov/coronavirus/2019-ncov/if-you-are-sick/care-for-someone.html     [facility]: Interim Guidance for Hospital Discharge to Home: www.health.[email or web address]/diseases/coronavirus/hcp/hospdischarge.pdf    [facility] clinical trials (COVID-19 research studies): [email or web address]     Below are the COVID-19 hotlines at the [place] Department of Health ([facility]). Interpreters are available.   o For health questions: Call 841-352-3074 or 1-492.523.3625 (7 a.m. to 7 p.m.)  o For questions about schools and childcare: Call 623-599-1614 or  6-479-642-8465 (7 a.m. to 7 p.m.)                         Reason for Disposition    SEVERE (e.g., excruciating) throat pain    Additional Information    Negative: Severe difficulty breathing (e.g., struggling for each breath, speaks in single words, stridor)    Negative: Sounds like a life-threatening emergency to the triager    Negative: [1] Diagnosed strep throat AND [2] taking antibiotic AND [3] symptoms continue    Negative: Throat culture results, call about    Negative: Productive cough is main symptom    Negative: Non-productive cough is main symptom    Negative: Hoarseness is main symptom    Negative: Runny nose is main symptom    Negative: [1] Difficulty breathing AND [2] not severe    Negative: Fever > 104 F (40 C)    Negative: [1] Refuses to drink anything AND [2] for > 12 hours    Negative: [1] Drinking very little AND [2] dehydration suspected (e.g., no urine > 12 hours, very dry mouth, very lightheaded)    Negative: Patient sounds very sick or weak to the triager    Protocols used: SORE THROAT-A-

## 2022-04-11 ENCOUNTER — OFFICE VISIT (OUTPATIENT)
Dept: URGENT CARE | Facility: URGENT CARE | Age: 32
End: 2022-04-11
Payer: COMMERCIAL

## 2022-04-11 VITALS
BODY MASS INDEX: 53.27 KG/M2 | DIASTOLIC BLOOD PRESSURE: 86 MMHG | SYSTOLIC BLOOD PRESSURE: 139 MMHG | WEIGHT: 293 LBS | TEMPERATURE: 98.1 F | HEART RATE: 96 BPM | OXYGEN SATURATION: 100 % | RESPIRATION RATE: 20 BRPM

## 2022-04-11 DIAGNOSIS — J06.9 UPPER RESPIRATORY TRACT INFECTION, UNSPECIFIED TYPE: Primary | ICD-10-CM

## 2022-04-11 LAB
DEPRECATED S PYO AG THROAT QL EIA: NEGATIVE
FLUAV AG SPEC QL IA: NEGATIVE
FLUBV AG SPEC QL IA: NEGATIVE

## 2022-04-11 PROCEDURE — 87651 STREP A DNA AMP PROBE: CPT | Performed by: PHYSICIAN ASSISTANT

## 2022-04-11 PROCEDURE — 87804 INFLUENZA ASSAY W/OPTIC: CPT | Performed by: PHYSICIAN ASSISTANT

## 2022-04-11 PROCEDURE — 99203 OFFICE O/P NEW LOW 30 MIN: CPT | Mod: CS | Performed by: PHYSICIAN ASSISTANT

## 2022-04-11 PROCEDURE — U0003 INFECTIOUS AGENT DETECTION BY NUCLEIC ACID (DNA OR RNA); SEVERE ACUTE RESPIRATORY SYNDROME CORONAVIRUS 2 (SARS-COV-2) (CORONAVIRUS DISEASE [COVID-19]), AMPLIFIED PROBE TECHNIQUE, MAKING USE OF HIGH THROUGHPUT TECHNOLOGIES AS DESCRIBED BY CMS-2020-01-R: HCPCS | Performed by: PHYSICIAN ASSISTANT

## 2022-04-11 PROCEDURE — U0005 INFEC AGEN DETEC AMPLI PROBE: HCPCS | Performed by: PHYSICIAN ASSISTANT

## 2022-04-11 NOTE — PROGRESS NOTES
Assessment & Plan     Upper respiratory tract infection, unspecified type  Rapid strep negative.  Influenza antigen in clinic negative.  Strep PCR and Covid test pending at end of visit.  Advised conservative management including acetaminophen/ibuprofen, rest, hydration, and follow-up with PCP if no improvement over the course of the next week.  - Influenza A & B Antigen - Clinic Collect  - Symptomatic; Yes; 4/8/2022 COVID-19 Virus (Coronavirus) by PCR Nose  - Streptococcus A Rapid Screen w/Reflex to PCR - Clinic Collect  - Group A Streptococcus PCR Throat Swab     I spent a total of 30 minutes on the day of the visit.   Time spent doing chart review, history and exam, documentation and further activities per the note    Return in about 1 week (around 4/18/2022) for reevaluation with PCP if symptoms not improving, Covid testing, next follow-up based on results.    GUERO Draper [facility] URGENT CARE MIGUEL Messina is a 32 year old female who presents to clinic today for the following health issues:  Chief Complaint   Patient presents with     Urgent Care     URI     Start 4/8/22 sx headache, body aches, bilateral ear pain, bilateral eye pain, chills, runny nose, chest feels, diarrhea-10-12x/24 hrs, tx Sharri, honey and lemon tea with mint, Mucinex DM and Delsym @ HOME covid last night neg      HPI  Patient is a 32-year-old female without significant medical history presenting to urgent care for evaluation of upper respiratory infectious type symptoms.  Patient notes onset of symptoms 3 days ago, complaining of congestion, rhinorrhea, sore throat, headache, body aches, fatigue, bilateral ear pain, subjective fever and chills, coughing, and diarrhea.  Does note some mild lightheadedness as well.  Has been using some over-the-counter treatment including Mucinex and Delsym, has not attempted using ibuprofen or acetaminophen.  Took a home COVID test last night that was negative.  Reports  having a single immunization of the Mj [employer] Mj vaccine.  Did not receive an influenza vaccine for the season.  Reports brother at home with similar symptoms previously.  Denies nausea/vomiting, visual disturbance, difficulty swallowing, chest pain, hemoptysis, abdominal pain, urinary symptoms, bright red blood per rectum, melena, flank pain, or other complaints.    Review of Systems  Focused ROS obtained, pertinent positives/negatives reviewed in the HPI.       Objective    /86   Pulse 96   Temp 98.1  F (36.7  C)   Resp 20   Wt 145.2 kg (320 lb 1.6 oz)   LMP 03/01/2022 (Approximate)   SpO2 100%   BMI 53.27 kg/m    Physical Exam   GENERAL: alert, no distress and fatigued  HENT: normal cephalic/atraumatic, ear canals and TM's normal, nose and mouth without ulcers or lesions, oropharynx clear, oral mucous membranes moist and sinuses: not tender  NECK: no adenopathy and no JVD  RESP: lungs clear to auscultation - no rales, rhonchi or wheezes  CV: regular rates and rhythm, peripheral pulses strong and no peripheral edema  ABDOMEN: soft, nontender  MS: no gross musculoskeletal defects noted, no edema  SKIN: no suspicious lesions or rashes    Results for orders placed or performed in visit on 04/11/22 (from the past 24 hour(s))   Influenza A & B Antigen - Clinic Collect    Specimen: Nose; Swab   Result Value Ref Range    Influenza A antigen Negative Negative    Influenza B antigen Negative Negative    Narrative    Test results must be correlated with clinical data. If necessary, results should be confirmed by a molecular assay or viral culture.   Symptomatic; Yes; 4/8/2022 COVID-19 Virus (Coronavirus) by PCR Nose    Specimen: Nose; Swab   Result Value Ref Range    SARS CoV2 PCR Negative Negative, Testing sent to reference lab. Results will be returned via unsolicited result    Narrative    Testing was performed using the Aptima SARS-CoV-2 Assay on the  [employer] Instrument System. Additional information  about this  Emergency Use Authorization (EUA) assay can be found via the Lab  Guide. This test should be ordered for the detection of SARS-CoV-2 in  individuals who meet SARS-CoV-2 clinical and/or epidemiological  criteria. Test performance is unknown in asymptomatic patients. This  test is for in vitro diagnostic use under the FDA EUA for  laboratories certified under CLIA to perform high complexity testing.  This test has not been FDA cleared or approved. A negative result  does not rule out the presence of PCR inhibitors in the specimen or  target RNA in concentration below the limit of detection for the  assay. The possibility of a false negative should be considered if  the patient's recent exposure or clinical presentation suggests  COVID-19. This test was validated by the [facility]. This laboratory is certified under  the Clinical Laboratory Improvement Amendments of 1988 (CLIA-88) as  qualified to perform high complexity laboratory testing.   Streptococcus A Rapid Screen w/Reflex to PCR - Clinic Collect    Specimen: Throat; Swab   Result Value Ref Range    Group A Strep antigen Negative Negative

## 2022-04-11 NOTE — PATIENT INSTRUCTIONS
Use ibuprofen and acetaminophen as needed for discomfort.  Salt water gargle for sore throat.  As we discussed, use over-the-counter loperamide for treatment of diarrhea.  Stay well-hydrated.  Follow-up with primary care provider if no improvement in symptoms.  Covid test results will return tomorrow, stay home quarantine until results are known.

## 2022-04-12 LAB
GROUP A STREP BY PCR: NOT DETECTED
SARS-COV-2 RNA RESP QL NAA+PROBE: NEGATIVE

## 2022-05-01 ENCOUNTER — HEALTH MAINTENANCE LETTER (OUTPATIENT)
Age: 32
End: 2022-05-01

## 2022-06-20 ENCOUNTER — OFFICE VISIT (OUTPATIENT)
Dept: URGENT CARE | Facility: URGENT CARE | Age: 32
End: 2022-06-20
Payer: COMMERCIAL

## 2022-06-20 VITALS
HEART RATE: 86 BPM | TEMPERATURE: 97.5 F | RESPIRATION RATE: 14 BRPM | DIASTOLIC BLOOD PRESSURE: 76 MMHG | SYSTOLIC BLOOD PRESSURE: 128 MMHG | OXYGEN SATURATION: 100 % | WEIGHT: 293 LBS | BODY MASS INDEX: 53.25 KG/M2

## 2022-06-20 DIAGNOSIS — Z20.822 SUSPECTED COVID-19 VIRUS INFECTION: Primary | ICD-10-CM

## 2022-06-20 PROCEDURE — U0003 INFECTIOUS AGENT DETECTION BY NUCLEIC ACID (DNA OR RNA); SEVERE ACUTE RESPIRATORY SYNDROME CORONAVIRUS 2 (SARS-COV-2) (CORONAVIRUS DISEASE [COVID-19]), AMPLIFIED PROBE TECHNIQUE, MAKING USE OF HIGH THROUGHPUT TECHNOLOGIES AS DESCRIBED BY CMS-2020-01-R: HCPCS | Performed by: PHYSICIAN ASSISTANT

## 2022-06-20 PROCEDURE — U0005 INFEC AGEN DETEC AMPLI PROBE: HCPCS | Performed by: PHYSICIAN ASSISTANT

## 2022-06-20 PROCEDURE — 99213 OFFICE O/P EST LOW 20 MIN: CPT | Mod: CS | Performed by: PHYSICIAN ASSISTANT

## 2022-06-20 NOTE — PROGRESS NOTES
URGENT CARE VISIT:    SUBJECTIVE:   Siddharth Fontanez is a 32 year old female presenting with a chief complaint of cough - non-productive and headache.  Onset was today.  She denies the following symptoms: fever, chills, stuffy nose, shortness of breath, vomiting and diarrhea  Course of illness is same.    Treatment measures tried include None tried with no relief of symptoms.  Predisposing factors include exposed to COVID.    PMH: History reviewed. No pertinent past medical history.  Allergies: Patient has no known allergies.   Medications:   Current Outpatient Medications   Medication Sig Dispense Refill     Dextromethorphan Polistirex (DELSYM PO)        Dextromethorphan-guaiFENesin (MUCINEX DM PO)        norgestrel-ethinyl estradiol (LO/OVRAL) 0.3-30 MG-MCG tablet Take 1 tablet by mouth daily (Patient not taking: Reported on 4/11/2022) 30 tablet 0     phentermine (ADIPEX-P) 37.5 MG tablet Take 37.5 mg by mouth every morning (before breakfast) (Patient not taking: No sig reported)       Social History:   Social History     Tobacco Use     Smoking status: Never Smoker     Smokeless tobacco: Never Used   Substance Use Topics     Alcohol use: No       ROS:  Review of systems negative except as stated above.    OBJECTIVE:  /76 (BP Location: Right arm)   Pulse 86   Temp 97.5  F (36.4  C) (Tympanic)   Resp 14   Wt 145.2 kg (320 lb)   SpO2 100%   BMI 53.25 kg/m    GENERAL APPEARANCE: healthy, alert and no distress  EYES: EOMI,  PERRL, conjunctiva clear  HENT: ear canals and TM's normal.  Nose and mouth without ulcers, erythema or lesions  NECK: supple, nontender, no lymphadenopathy  RESP: lungs clear to auscultation - no rales, rhonchi or wheezes  CV: regular rates and rhythm, normal S1 S2, no murmur noted  SKIN: no suspicious lesions or rashes      ASSESSMENT:    ICD-10-CM    1. Suspected COVID-19 virus infection  Z20.822 Symptomatic; Unknown COVID-19 Virus (Coronavirus) by PCR Nasopharyngeal       PLAN:  Patient  Instructions   Patient was educated on the natural course of viral illness. COVID PCR is pending. Isolation precautions discussed. Conservative measures discussed including increased fluids, rest, salt water gargles, and analgesics (Tylenol and/or Ibuprofen). See your primary care provider if symptoms worsen or do not improve in 7 days. Seek emergency care if you develop fever over 104 or shortness of breath.    Patient verbalized understanding and is agreeable to plan. The patient was discharged ambulatory and in stable condition.    Beth Zamarripa PA-C ....................  6/20/2022   5:06 PM

## 2022-06-20 NOTE — PATIENT INSTRUCTIONS
Patient was educated on the natural course of viral illness. COVID PCR is pending. Isolation precautions discussed. Conservative measures discussed including increased fluids, rest, salt water gargles, and analgesics (Tylenol and/or Ibuprofen). See your primary care provider if symptoms worsen or do not improve in 7 days. Seek emergency care if you develop fever over 104 or shortness of breath.

## 2022-06-22 LAB — SARS-COV-2 RNA RESP QL NAA+PROBE: NEGATIVE

## 2022-11-20 ENCOUNTER — HEALTH MAINTENANCE LETTER (OUTPATIENT)
Age: 32
End: 2022-11-20

## 2022-11-29 ENCOUNTER — OFFICE VISIT (OUTPATIENT)
Dept: URGENT CARE | Facility: URGENT CARE | Age: 32
End: 2022-11-29
Payer: COMMERCIAL

## 2022-11-29 VITALS
RESPIRATION RATE: 16 BRPM | DIASTOLIC BLOOD PRESSURE: 74 MMHG | TEMPERATURE: 97.5 F | BODY MASS INDEX: 48.82 KG/M2 | SYSTOLIC BLOOD PRESSURE: 130 MMHG | HEART RATE: 82 BPM | HEIGHT: 65 IN | OXYGEN SATURATION: 100 % | WEIGHT: 293 LBS

## 2022-11-29 DIAGNOSIS — B34.9 VIRAL SYNDROME: Primary | ICD-10-CM

## 2022-11-29 PROCEDURE — 99213 OFFICE O/P EST LOW 20 MIN: CPT | Mod: CS | Performed by: PHYSICIAN ASSISTANT

## 2022-11-29 PROCEDURE — U0003 INFECTIOUS AGENT DETECTION BY NUCLEIC ACID (DNA OR RNA); SEVERE ACUTE RESPIRATORY SYNDROME CORONAVIRUS 2 (SARS-COV-2) (CORONAVIRUS DISEASE [COVID-19]), AMPLIFIED PROBE TECHNIQUE, MAKING USE OF HIGH THROUGHPUT TECHNOLOGIES AS DESCRIBED BY CMS-2020-01-R: HCPCS | Performed by: PHYSICIAN ASSISTANT

## 2022-11-29 PROCEDURE — U0005 INFEC AGEN DETEC AMPLI PROBE: HCPCS | Performed by: PHYSICIAN ASSISTANT

## 2022-11-29 RX ORDER — OMEGA-3 FATTY ACIDS/FISH OIL 300-1000MG
200 CAPSULE ORAL EVERY 4 HOURS PRN
COMMUNITY
End: 2023-01-06

## 2022-11-29 ASSESSMENT — PAIN SCALES - GENERAL: PAINLEVEL: EXTREME PAIN (8)

## 2022-11-29 NOTE — PROGRESS NOTES
ASSESSMENT/PLAN:     (B34.9) Viral syndrome  (primary encounter diagnosis)    MDM: Acute onset upper respiratory symptoms consistent with viral URI 2-3 weeks ago, with reported resolution. No evidence of secondary bacterial infection on exam. Single episode of emesis and mild loose stools. No evidence of respiratory distress or other medical distress requiring emergent intervention at this time. Abdominal exam is reassuring.         November 29, 2022 [facility] Plan:      -As needed Tylenol or Ibuprofen  -Decrease the sugar beverages you are taking in (they may be causing your loose stools)    -Encourage Fluids   -Monitor fever and illness symptoms   -Go to ER if any concern for severe difficulty breathing  (as we discussed today)   -Cool mist humidifier at night   -Normal Saline nasal rinse can be tried   -A teaspoon of honey may help cough  -Cough drops may help some     -See  primary care provider if symtpoms are not gone in 1 week, sooner if worsening.   -Educational handouts are provided      -----------------------        SUBJECTIVE:     Siddharth Fontanez presents to  today for evaluation of changing acute illness symptoms.     HPI: Patient states 2-3 weeks ago she developed cough, runny nose, body aches, headache and fever. She did have times where she felt short of breath during her upper respiratory illness symptoms. Those symptoms have reportedly now resolved, but patient states she has developed some diarrhea and vomiting over the past several days.     She reports one, isolated, non-bloody emesis in the past 24 hours. She reports 5-6 episodes of non-bloody, loose stool over the past 24 hours.     Of note, patient has been eating a full diet, has increased fluids, including juices and tea.         RESPIRATORY HISTORY No prior history of hospitalization for respiratory distress. No known asthma, reactive disease or other respiratory history.        ROS (over past week): No associated severe  "cough,coughing up of blood, blue lips/fingers/toes, shortness of breath, chest pain, wheezing, abdominal pain,  severe body aches, severe headaches, rashes, joint swelling or other acute illness symptoms. No concerns for pregnancy/states she is not currently sexually active. No GYN symptoms. No pain with urination or UTI symtpoms.       No past medical history on file.    Patient Active Problem List   Diagnosis     Foot dislocation     Headache     CARDIOVASCULAR SCREENING; LDL GOAL LESS THAN 160     Morbid obesity (H)       Current Outpatient Medications   Medication     Dextromethorphan Polistirex (DELSYM PO)     Dextromethorphan-guaiFENesin (MUCINEX DM PO)     norgestrel-ethinyl estradiol (LO/OVRAL) 0.3-30 MG-MCG tablet     phentermine (ADIPEX-P) 37.5 MG tablet     No current facility-administered medications for this visit.       No Known Allergies      OBJECTIVE:  /74 (BP Location: Right arm, Patient Position: Sitting, Cuff Size: Adult Large)   Pulse 82   Temp 97.5  F (36.4  C) (Tympanic)   Resp 16   Ht 1.651 m (5' 5\")   Wt 145.2 kg (320 lb)   SpO2 100%   BMI 53.25 kg/m              General appearance: alert and no apparent distress  Skin color is uniform in color and without rash.  HEENT:   Conjunctiva not injected.  Sclera clear.  Left TM is normal: no effusions, no erythema, and normal landmarks.  Right TM is normal: no effusions, no erythema, and normal landmarks.  Nasal mucosa is clear   Oropharyngeal exam is positive for mild erythema.  No asymmetry. Uvula is midline. No trismus. Voice is clear. No lesions, adenopathy, plaque or exudate.  Neck is supple, FROM. No neck stiffness. No adenopathy  CARDIAC:NORMAL - regular rate and rhythm without murmur.  RESP: No increased work of breathing. Patient is able to speak multiple sentences without pause. Lung fields are clear to ausculation. No rales, rhonchi, or wheezing.  ABDOMEN:  Soft, non-tender, non-distended.  Positive normal bowel sounds.  No " HSM or masses.  No suprapubic tenderness.  No CVA tenderness.  NEURO: Alert and oriented.  Normal speech and mentation.  CN II/XII grossly intact.  Gait within normal limits.          LAB:   Patient requests Covid-19 screening/states she wonders if her cold symptoms 2-3 weeks ago might have been a Covid infection

## 2022-11-29 NOTE — PATIENT INSTRUCTIONS
November 29, 2022 [facility]:      -As needed Tylenol or Ibuprofen  -Decrease the sugar beverages you are taking in (they may be causing your loose stools)    -Encourage Fluids   -Monitor fever and illness symptoms   -Go to ER if any concern for severe difficulty breathing  (as we discussed today)   -Cool mist humidifier at night   -Normal Saline nasal rinse can be tried   -A teaspoon of honey may help cough  -Cough drops may help some

## 2022-11-30 LAB — SARS-COV-2 RNA RESP QL NAA+PROBE: NEGATIVE

## 2022-12-09 ENCOUNTER — OFFICE VISIT (OUTPATIENT)
Dept: FAMILY MEDICINE | Facility: CLINIC | Age: 32
End: 2022-12-09
Payer: COMMERCIAL

## 2022-12-09 VITALS
WEIGHT: 201 LBS | RESPIRATION RATE: 19 BRPM | TEMPERATURE: 99.1 F | HEIGHT: 64 IN | SYSTOLIC BLOOD PRESSURE: 94 MMHG | BODY MASS INDEX: 34.31 KG/M2 | OXYGEN SATURATION: 98 % | HEART RATE: 69 BPM | DIASTOLIC BLOOD PRESSURE: 62 MMHG

## 2022-12-09 DIAGNOSIS — K08.89 TOOTHACHE: ICD-10-CM

## 2022-12-09 DIAGNOSIS — Z11.59 NEED FOR HEPATITIS C SCREENING TEST: ICD-10-CM

## 2022-12-09 DIAGNOSIS — Z12.4 CERVICAL CANCER SCREENING: ICD-10-CM

## 2022-12-09 DIAGNOSIS — Z00.00 ENCOUNTER FOR ANNUAL PHYSICAL EXAM: Primary | ICD-10-CM

## 2022-12-09 DIAGNOSIS — Z11.4 SCREENING FOR HIV (HUMAN IMMUNODEFICIENCY VIRUS): ICD-10-CM

## 2022-12-09 DIAGNOSIS — N97.9 FEMALE INFERTILITY: ICD-10-CM

## 2022-12-09 DIAGNOSIS — Z13.220 LIPID SCREENING: ICD-10-CM

## 2022-12-09 LAB
ALBUMIN SERPL BCG-MCNC: 4.1 G/DL (ref 3.5–5.2)
ALP SERPL-CCNC: 72 U/L (ref 35–104)
ALT SERPL W P-5'-P-CCNC: 18 U/L (ref 10–35)
ANION GAP SERPL CALCULATED.3IONS-SCNC: 10 MMOL/L (ref 7–15)
AST SERPL W P-5'-P-CCNC: 27 U/L (ref 10–35)
BILIRUB SERPL-MCNC: 0.4 MG/DL
BUN SERPL-MCNC: 9 MG/DL (ref 6–20)
CALCIUM SERPL-MCNC: 8.9 MG/DL (ref 8.6–10)
CHLORIDE SERPL-SCNC: 102 MMOL/L (ref 98–107)
CHOLEST SERPL-MCNC: 181 MG/DL
CREAT SERPL-MCNC: 0.76 MG/DL (ref 0.51–0.95)
DEPRECATED HCO3 PLAS-SCNC: 25 MMOL/L (ref 22–29)
GFR SERPL CREATININE-BSD FRML MDRD: >90 ML/MIN/1.73M2
GLUCOSE SERPL-MCNC: 85 MG/DL (ref 70–99)
HDLC SERPL-MCNC: 46 MG/DL
LDLC SERPL CALC-MCNC: 125 MG/DL
NONHDLC SERPL-MCNC: 135 MG/DL
POTASSIUM SERPL-SCNC: 3.8 MMOL/L (ref 3.4–5.3)
PROT SERPL-MCNC: 7.8 G/DL (ref 6.4–8.3)
SODIUM SERPL-SCNC: 137 MMOL/L (ref 136–145)
TRIGL SERPL-MCNC: 51 MG/DL

## 2022-12-09 PROCEDURE — 80053 COMPREHEN METABOLIC PANEL: CPT | Performed by: FAMILY MEDICINE

## 2022-12-09 PROCEDURE — 99395 PREV VISIT EST AGE 18-39: CPT | Performed by: FAMILY MEDICINE

## 2022-12-09 PROCEDURE — 82306 VITAMIN D 25 HYDROXY: CPT | Performed by: FAMILY MEDICINE

## 2022-12-09 PROCEDURE — 86803 HEPATITIS C AB TEST: CPT | Performed by: FAMILY MEDICINE

## 2022-12-09 PROCEDURE — 80061 LIPID PANEL: CPT | Performed by: FAMILY MEDICINE

## 2022-12-09 PROCEDURE — 87389 HIV-1 AG W/HIV-1&-2 AB AG IA: CPT | Performed by: FAMILY MEDICINE

## 2022-12-09 PROCEDURE — 99213 OFFICE O/P EST LOW 20 MIN: CPT | Mod: 25 | Performed by: FAMILY MEDICINE

## 2022-12-09 PROCEDURE — 36415 COLL VENOUS BLD VENIPUNCTURE: CPT | Performed by: FAMILY MEDICINE

## 2022-12-09 ASSESSMENT — ENCOUNTER SYMPTOMS
SORE THROAT: 0
DYSURIA: 0
WEAKNESS: 0
HEARTBURN: 1
SHORTNESS OF BREATH: 0
HEMATURIA: 0
BREAST MASS: 0
NERVOUS/ANXIOUS: 0
HEADACHES: 0
NAUSEA: 0
DIZZINESS: 0
ABDOMINAL PAIN: 0
EYE PAIN: 0
CONSTIPATION: 0
PALPITATIONS: 0
DIARRHEA: 0
PARESTHESIAS: 0
COUGH: 0
ARTHRALGIAS: 0
HEMATOCHEZIA: 0
FEVER: 0
MYALGIAS: 0
JOINT SWELLING: 0
CHILLS: 0
FREQUENCY: 0

## 2022-12-09 ASSESSMENT — PAIN SCALES - GENERAL: PAINLEVEL: NO PAIN (0)

## 2022-12-09 NOTE — LETTER
December 13, 2022      Henry Bautista  [address] [place] [id number]        Dear ,        I have reviewed your recent labs. Here are the results:     -Cholesterol levels (LDL,HDL, Triglycerides) are normal.  ADVISE: rechecking in 1 year.   -Liver and gallbladder tests are normal (ALT,AST, Alk phos, bilirubin), kidney function is normal (Cr, GFR), sodium is normal, potassium is normal, calcium is normal, glucose is normal.   -Hepatitis C antibody screen test shows no signs of a previous hepatitis C infection.   -HIV test is normal.   -Vitamin D level is normal and getting 1000 IU daily in your diet or supplements is recommended.       Resulted Orders   HIV Antigen Antibody Combo   Result Value Ref Range    HIV Antigen Antibody Combo Nonreactive Nonreactive      Comment:      HIV-1 p24 Ag & HIV-1/HIV-2 Ab Not Detected   Hepatitis C Screen Reflex to HCV RNA Quant and Genotype   Result Value Ref Range    Hepatitis C Antibody Nonreactive Nonreactive    Narrative    Assay performance characteristics have not been established for newborns, infants, and children.   Comprehensive metabolic panel (BMP + Alb, Alk Phos, ALT, AST, Total. Bili, TP)   Result Value Ref Range    Sodium 137 136 - 145 mmol/L    Potassium 3.8 3.4 - 5.3 mmol/L    Chloride 102 98 - 107 mmol/L    Carbon Dioxide (CO2) 25 22 - 29 mmol/L    Anion Gap 10 7 - 15 mmol/L    Urea Nitrogen 9.0 6.0 - 20.0 mg/dL    Creatinine 0.76 0.51 - 0.95 mg/dL    Calcium 8.9 8.6 - 10.0 mg/dL    Glucose 85 70 - 99 mg/dL    Alkaline Phosphatase 72 35 - 104 U/L    AST 27 10 - 35 U/L    ALT 18 10 - 35 U/L    Protein Total 7.8 6.4 - 8.3 g/dL    Albumin 4.1 3.5 - 5.2 g/dL    Bilirubin Total 0.4 <=1.2 mg/dL    GFR Estimate >90 >60 mL/min/1.73m2      Comment:      Effective December 21, 2021 eGFRcr in adults is calculated using the 2021 CKD-EPI creatinine equation which includes age and gender (Risa lee al., NEJM, DOI: 10.1056/BFAVrd7294615)   Vitamin D  Deficiency   Result Value Ref Range    Vitamin D, Total (25-Hydroxy) 29 20 - 75 ug/L    Narrative    Season, race, dietary intake, and treatment affect the concentration of 25-hydroxy-Vitamin D. Values may decrease during winter months and increase during summer months. Values 20-29 ug/L may indicate Vitamin D insufficiency and values <20 ug/L may indicate Vitamin D deficiency.    Vitamin D determination is routinely performed by an immunoassay specific for 25 hydroxyvitamin D3.  If an individual is on vitamin D2(ergocalciferol) supplementation, please specify 25 OH vitamin D2 and D3 level determination by LCMSMS test VITD23.     Lipid Profile   Result Value Ref Range    Cholesterol 181 <200 mg/dL    Triglycerides 51 <150 mg/dL    Direct Measure HDL 46 (L) >=50 mg/dL    LDL Cholesterol Calculated 125 (H) <=100 mg/dL    Non HDL Cholesterol 135 (H) <130 mg/dL    Narrative    Cholesterol  Desirable:  <200 mg/dL    Triglycerides  Normal:  Less than 150 mg/dL  Borderline High:  150-199 mg/dL  High:  200-499 mg/dL  Very High:  Greater than or equal to 500 mg/dL    Direct Measure HDL  Female:  Greater than or equal to 50 mg/dL   Male:  Greater than or equal to 40 mg/dL    LDL Cholesterol  Desirable:  <100mg/dL  Above Desirable:  100-129 mg/dL   Borderline High:  130-159 mg/dL   High:  160-189 mg/dL   Very High:  >= 190 mg/dL    Non HDL Cholesterol  Desirable:  130 mg/dL  Above Desirable:  130-159 mg/dL  Borderline High:  160-189 mg/dL  High:  190-219 mg/dL  Very High:  Greater than or equal to 220 mg/dL       If you have any questions or concerns, please call the clinic at the number listed above.       Sincerely,      Андрей Greene MD

## 2022-12-09 NOTE — PROGRESS NOTES
SUBJECTIVE:   CC: Henry is an 32 year old who presents for preventive health visit.     Healthy Habits:     Getting at least 3 servings of Calcium per day:  Yes    Bi-annual eye exam:  NO    Dental care twice a year:  Yes    Sleep apnea or symptoms of sleep apnea:  None    Diet:  Low fat/cholesterol    Frequency of exercise:  1 day/week    Duration of exercise:  Less than 15 minutes    Taking medications regularly:  Yes    Medication side effects:  None    PHQ-2 Total Score: 0    Additional concerns today:  No    Issue with female infertility.  Tooth ache had some filling done.  Patient does not want any vaccine.      Today's PHQ-2 Score:   PHQ-2 ( 1999 [employer]) 12/9/2022   Q1: Little interest or pleasure in doing things 0   Q2: Feeling down, depressed or hopeless 0   PHQ-2 Score 0   Q1: Little interest or pleasure in doing things Not at all   Q2: Feeling down, depressed or hopeless Not at all   PHQ-2 Score 0       Have you ever done Advance Care Planning? (For example, a Health Directive, POLST, or a discussion with a medical provider or your loved ones about your wishes): No, advance care planning information given to patient to review.  Patient plans to discuss their wishes with loved ones or provider.      Social History     Tobacco Use     Smoking status: Never     Smokeless tobacco: Not on file   Substance Use Topics     Alcohol use: Not Currently     If you drink alcohol do you typically have >3 drinks per day or >7 drinks per week? Not applicable    Alcohol Use 12/9/2022   Prescreen: >3 drinks/day or >7 drinks/week? No   No flowsheet data found.    Reviewed orders with patient.  Reviewed health maintenance and updated orders accordingly - Yes  Lab work is in process    Breast Cancer Screening:    Breast CA Risk Assessment (FHS-7) 12/9/2022   Do you have a family history of breast, colon, or ovarian cancer? No / Unknown       click delete button to remove this line now  Mammogram Screening: Recommended  "annual mammography  Pertinent mammograms are reviewed under the imaging tab.    History of abnormal Pap smear: NO - age 30- 65 PAP every 3 years recommended     Reviewed and updated as needed this visit by clinical staff   Tobacco  Allergies  Meds              Reviewed and updated as needed this visit by Provider                     Review of Systems   Constitutional: Negative for chills and fever.   HENT: Negative for congestion, ear pain, hearing loss and sore throat.    Eyes: Negative for pain and visual disturbance.   Respiratory: Negative for cough and shortness of breath.    Cardiovascular: Negative for chest pain, palpitations and peripheral edema.   Gastrointestinal: Positive for heartburn. Negative for abdominal pain, constipation, diarrhea, hematochezia and nausea.   Breasts:  Negative for tenderness, breast mass and discharge.   Genitourinary: Negative for dysuria, frequency, genital sores, hematuria, pelvic pain, urgency, vaginal bleeding and vaginal discharge.   Musculoskeletal: Negative for arthralgias, joint swelling and myalgias.   Skin: Negative for rash.   Neurological: Negative for dizziness, weakness, headaches and paresthesias.   Psychiatric/Behavioral: Negative for mood changes. The patient is not nervous/anxious.        OBJECTIVE:   BP 94/62   Pulse 69   Temp 99.1  F (37.3  C) (Tympanic)   Resp 19   Ht 1.633 m (5' 4.29\")   Wt 91.2 kg (201 lb)   SpO2 98%   BMI 34.19 kg/m    Physical Exam  GENERAL: healthy, alert and no distress  EYES: Eyes grossly normal to inspection, PERRL and conjunctivae and sclerae normal  HENT: ear canals and TM's normal, nose and mouth without ulcers or lesions  NECK: no adenopathy, no asymmetry, masses, or scars and thyroid normal to palpation  RESP: lungs clear to auscultation - no rales, rhonchi or wheezes  CV: regular rate and rhythm, normal S1 S2, no S3 or S4, no murmur, click or rub, no peripheral edema and peripheral pulses strong  ABDOMEN: soft, " nontender, no hepatosplenomegaly, no masses and bowel sounds normal  MS: no gross musculoskeletal defects noted, no edema  SKIN: no suspicious lesions or rashes  NEURO: Normal strength and tone, mentation intact and speech normal  PSYCH: mentation appears normal, affect normal/bright    Diagnostic Test Results:  Labs reviewed in Epic    ASSESSMENT/PLAN:   Henry was seen today for physical.    Diagnoses and all orders for this visit:    Encounter for annual physical exam  -     HIV Antigen Antibody Combo; Future  -     Hepatitis C Screen Reflex to HCV RNA Quant and Genotype; Future  -     Comprehensive metabolic panel (BMP + Alb, Alk Phos, ALT, AST, Total. Bili, TP); Future  -     Vitamin D Deficiency; Future  -     Lipid Profile; Future    Screening for HIV (human immunodeficiency virus)  -     HIV Antigen Antibody Combo; Future    Need for hepatitis C screening test  -     Hepatitis C Screen Reflex to HCV RNA Quant and Genotype; Future    Cervical cancer screening  She would like to get referral provided as well as if she like she can come back for a female provider.  Lipid screening  -     Comprehensive metabolic panel (BMP + Alb, Alk Phos, ALT, AST, Total. Bili, TP); Future  -     Lipid Profile; Future    Toothache  Could not see any gum infection however suggested to see dentist possible some filling issue.  Female infertility  -     Ob/Gyn Referral; Future  Patient has 2 child last 1 about 4 years ago from the same placement.  Dealing with some infertility issue for the last 4 years and not able to get pregnant.  Other orders  -     REVIEW OF HEALTH MAINTENANCE PROTOCOL ORDERS  -     TDAP VACCINE (Adacel, Boostrix)        Patient has been advised of split billing requirements and indicates understanding: Yes      COUNSELING:  Reviewed preventive health counseling, as reflected in patient instructions       Healthy diet/nutrition       Vision screening      BMI:   Estimated body mass index is 34.19 kg/m  as  "calculated from the following:    Height as of this encounter: 1.633 m (5' 4.29\").    Weight as of this encounter: 91.2 kg (201 lb).         She reports that she has never smoked. She does not have any smokeless tobacco history on file.          Андрей Greene MD  [facility]  "

## 2022-12-12 LAB
DEPRECATED CALCIDIOL+CALCIFEROL SERPL-MC: 29 UG/L (ref 20–75)
HCV AB SERPL QL IA: NONREACTIVE
HIV 1+2 AB+HIV1 P24 AG SERPL QL IA: NONREACTIVE

## 2022-12-20 ENCOUNTER — ANCILLARY PROCEDURE (OUTPATIENT)
Dept: GENERAL RADIOLOGY | Facility: CLINIC | Age: 32
End: 2022-12-20
Attending: PHYSICIAN ASSISTANT
Payer: COMMERCIAL

## 2022-12-20 ENCOUNTER — OFFICE VISIT (OUTPATIENT)
Dept: URGENT CARE | Facility: URGENT CARE | Age: 32
End: 2022-12-20
Payer: COMMERCIAL

## 2022-12-20 VITALS — RESPIRATION RATE: 20 BRPM | TEMPERATURE: 97.8 F | OXYGEN SATURATION: 99 % | HEART RATE: 70 BPM

## 2022-12-20 DIAGNOSIS — M54.41 ACUTE RIGHT-SIDED LOW BACK PAIN WITH RIGHT-SIDED SCIATICA: ICD-10-CM

## 2022-12-20 DIAGNOSIS — M54.6 ACUTE BILATERAL THORACIC BACK PAIN: Primary | ICD-10-CM

## 2022-12-20 DIAGNOSIS — M54.6 ACUTE BILATERAL THORACIC BACK PAIN: ICD-10-CM

## 2022-12-20 PROCEDURE — 72070 X-RAY EXAM THORAC SPINE 2VWS: CPT | Mod: TC | Performed by: RADIOLOGY

## 2022-12-20 PROCEDURE — 99214 OFFICE O/P EST MOD 30 MIN: CPT | Performed by: PHYSICIAN ASSISTANT

## 2022-12-20 PROCEDURE — 72100 X-RAY EXAM L-S SPINE 2/3 VWS: CPT | Mod: TC | Performed by: RADIOLOGY

## 2022-12-20 RX ORDER — NAPROXEN 500 MG/1
500 TABLET ORAL 2 TIMES DAILY WITH MEALS
Qty: 14 TABLET | Refills: 0 | Status: SHIPPED | OUTPATIENT
Start: 2022-12-20 | End: 2022-12-27

## 2022-12-20 RX ORDER — CYCLOBENZAPRINE HCL 10 MG
5-10 TABLET ORAL 3 TIMES DAILY PRN
Qty: 21 TABLET | Refills: 0 | Status: SHIPPED | OUTPATIENT
Start: 2022-12-20 | End: 2022-12-27

## 2022-12-20 NOTE — PROGRESS NOTES
ASSESSMENT/PLAN:        MDM: Acute onset back pain after lifting furniture at home 1 month ago with associated RLE radicular symptoms. No cauda equina symptoms to suggest need for emergent intervention today.  We did discuss possible small sacral/lumbar spondylolisthesis and possible impingement or disc bulging (which cannot be seen on plain film x-ray). I started patient on trial of conservative management, as per below, and have advised short interval follow-up with primary care provider or an orthopedic doctor to review x-rays and response to treatment provided here today. Patient is educated about emergent and urgent criteria. Please see below patient discharge summary.     Discharge Summary:     December 20, 2022 [facility] Plan:     As we discussed, you should make a follow-up appointment to see your primary care provider for a recheck/to see how you respond to the treatment provided here today. Please make sure you review the x-rays with your doctor at your follow-up visit.    On my read of your x-rays, you may have a small spondylolisthesis (slight mal-alignment between your sacrum and low lumbar bone). Please review this with your primary care provider. They will let you know if you need further evaluation or if you need a referral to see a specialist.     Additionally, A radiologist report will show up in your MyChart in 1-2 days.        1.  I have prescribed Naproxen pain reliever/anti-inflammatory medication for you today. Do not take over-the-counter Ibuprofen (also called Motrin/Advil) or Aleve while taking this medication.     2. You may take over-the-counter dosesTylenol as needed for breakthrough pain. Do not take more than 3,000 mg of Tylenol in 24 hours.     3. Take the Flexeril muscle relaxer three times a day as tolerated.   As we discussed this medication can make some people drowsy. Do not drive while taking this medication until you see how this medication effects you personally. You  "may want to take a 1/2 tablet during the day and a whole tab at night.      4. Follow-up with your primary care provider or orthopedic doctor sooner if your symptom change, worsen or fail to fully resolve with the treatment provided here today over the next week.    If you develop any of the below, report to the emergency room:       You have a sudden change in your ability to control? your bladder or bowels.    You begin to feel numbness and tingling in your groin     The pain spreads down your leg and into your foot that does not go away with moving positions.    Your toes, feet or leg muscles begin to feel weak.    You feel generally unwell, develop a fever or sick.    Your pain suddenly gets severely worse.     You develop sudden onset abdominal pain       (M54.6) Acute bilateral thoracic back pain  (primary encounter diagnosis)  Plan: XR Thoracic Spine 2 Views    (M54.41) Acute right-sided low back pain with right-sided sciatica  Plan: XR Lumbar Spine 2/3 Views      -------------------    SUBJECTIVE  HPI: Siddharth Fontanez is a 32 year old female who presents to urgent care today  for evaluation of acute onset bilateral mid-back pain and right sided low back pain x 1 month.     HPI: Patient reports her symptoms began after she carried a new coffee table and cough up her stairs at home--and later lifted/moved/repositioned them.     Patient states her right low back pain is now severe. She does have some intermittent right leg pain and transient numbness sensation. No prolonged RLE symptoms. Patient states she can make these symptoms go away by \"shaking or stomping my leg\".       No other other overt injury or trauma by patient report     Worst with sitting, laying, prolonged standing, twisting and bending     Recent injury:   Personal hx of back problems: Has never seen ortho or neuro spine specialist. No hx of MR or advance spine imaging.   Pain is exacerbated by:  prolonged standing, twisting and bending   Pain is " relieved by: stretching and OTC dose of NSAIDs    Cauda Equina Review: No associated acute urinary or fecal incontinence, persistent lower extremity numbness, LE tingling, urinary incontinence, saddle area paraesthesias.       ROS:     CONSTITUTIONAL: negative for, fever, chills, fatigue .  CARDIAC: Negative for acute onset chest pain or unexplained shortness of breath   RESP: Negative for acute onset cough/wheeezing or shortness of breath   GI: Negative for any acute onset abdominal pain, N/V/D  SKIN: Negative for any acute onset rash/hives/vesicles or lesions   URINARY: Negative for any acute onset dysuria, urinary urgency/frequency, hematuria, fever, chills, nausea, vomiting  RHEUM: Negative for any acute onset red, warm or swollen joints     No past medical history on file.    Patient Active Problem List   Diagnosis     Foot dislocation     Headache     CARDIOVASCULAR SCREENING; LDL GOAL LESS THAN 160     Morbid obesity (H)       Current Outpatient Medications   Medication     Dextromethorphan Polistirex (DELSYM PO)     Dextromethorphan-guaiFENesin (MUCINEX DM PO)     ibuprofen (ADVIL/MOTRIN) 200 MG capsule     norgestrel-ethinyl estradiol (LO/OVRAL) 0.3-30 MG-MCG tablet     phentermine (ADIPEX-P) 37.5 MG tablet     No current facility-administered medications for this visit.       No Known Allergies        OBJECTIVE:  Pulse 70   Temp 97.8  F (36.6  C) (Tympanic)   Resp 20   SpO2 99%         GENERAL:  Very pleasant, comfortable and generally well appearing.  Back examination: Back symmetric, no curvature.  Positive for diffuse, multilevel, midline lumbar tenderness. Positive for right sided paravertebral lumbar tenderness. Positive for bilateral mid and lower thoracic paravertebral thoracic tenderness. No midline thoracic tenderness. . Neuro:  Gait within normal limits.  Quadriceps and great toe strength good and equal bilaterally.  Patellar  reflexes good and equal bilaterally. Sensation in multiple  dermatomal distributions LE good and equal bilaterally.  Able to forward bend with fingertips to just above ankles     STRAIGHT LEG RAISE: Negative.   RESP: lungs clear to auscultation - no rales, rhonchi or wheezes  CV: regular rates and rhythm, normal S1 S2, no murmur noted  ABDOMEN:  soft, nontender, no HSM or masses and bowel sounds normal  NEURO: Normal strength and tone with no weakness or sensory deficit noted, reflexes normal   SKIN: no suspicious lesions or rashes      XR THORACIC SPINE AND XR LUMBAR SPINE:     I reviewed my impression (No acute fracture. No acute findings), along with actual x-ray images, with patient during her office visit today.  We did discuss possible small sacral/lumbar spondylolisthesis. Radiologist over-read pending. Patient will need to be called if there areany acute findings on radiologist over-read.

## 2022-12-20 NOTE — PATIENT INSTRUCTIONS
December 20, 2022 [facility]:     As we discussed, you should make a follow-up appointment to see your primary care provider for a recheck/to see how you respond to the treatment provided here today. Please make sure you review the x-rays with your doctor at your follow-up visit.    On my read of your x-rays, you may have a small spondylolisthesis (slight mal-alignment between your sacrum and low lumbar bone). Please review this with your primary care provider. They will let you know if you need further evaluation or if you need a referral to see a specialist.     Additionally, A radiologist report will show up in your MyChart in 1-2 days.        1.  I have prescribed Naproxen pain reliever/anti-inflammatory medication for you today. Do not take over-the-counter Ibuprofen (also called Motrin/Advil) or Aleve while taking this medication.     2. You may take over-the-counter dosesTylenol as needed for breakthrough pain. Do not take more than 3,000 mg of Tylenol in 24 hours.     3. Take the Flexeril muscle relaxer three times a day as tolerated.   As we discussed this medication can make some people drowsy. Do not drive while taking this medication until you see how this medication effects you personally. You may want to take a 1/2 tablet during the day and a whole tab at night.      4. Follow-up with your primary care provider or orthopedic doctor sooner if your symptom change, worsen or fail to fully resolve with the treatment provided here today over the next week.    If you develop any of the below, report to the emergency room:     You have a sudden change in your ability to control? your bladder or bowels.  You begin to feel numbness and tingling in your groin   The pain spreads down your leg and into your foot that does not go away with moving positions.  Your toes, feet or leg muscles begin to feel weak.  You feel generally unwell, develop a fever or sick.  Your pain suddenly gets severely  worse.   You develop sudden onset abdominal pain

## 2023-01-06 ENCOUNTER — OFFICE VISIT (OUTPATIENT)
Dept: INTERNAL MEDICINE | Facility: CLINIC | Age: 33
End: 2023-01-06
Payer: COMMERCIAL

## 2023-01-06 VITALS
OXYGEN SATURATION: 98 % | RESPIRATION RATE: 20 BRPM | BODY MASS INDEX: 52.07 KG/M2 | HEART RATE: 130 BPM | TEMPERATURE: 97.1 F | SYSTOLIC BLOOD PRESSURE: 126 MMHG | WEIGHT: 293 LBS | DIASTOLIC BLOOD PRESSURE: 78 MMHG

## 2023-01-06 DIAGNOSIS — E66.01 MORBID OBESITY (H): ICD-10-CM

## 2023-01-06 DIAGNOSIS — K59.09 CHRONIC CONSTIPATION: ICD-10-CM

## 2023-01-06 DIAGNOSIS — M54.41 ACUTE BILATERAL LOW BACK PAIN WITH RIGHT-SIDED SCIATICA: Primary | ICD-10-CM

## 2023-01-06 PROCEDURE — 99213 OFFICE O/P EST LOW 20 MIN: CPT | Performed by: INTERNAL MEDICINE

## 2023-01-06 RX ORDER — SENNA AND DOCUSATE SODIUM 50; 8.6 MG/1; MG/1
2 TABLET, FILM COATED ORAL 2 TIMES DAILY
Qty: 120 TABLET | Refills: 2 | Status: SHIPPED | OUTPATIENT
Start: 2023-01-06 | End: 2023-03-08

## 2023-01-06 ASSESSMENT — PAIN SCALES - GENERAL: PAINLEVEL: NO PAIN (0)

## 2023-01-06 NOTE — PROGRESS NOTES
Assessment & Plan     Acute bilateral low back pain with right-sided sciatica  She initially had some thoracic pain which has improved but she seems to be having some increasing low back pain with radiation into the thighs.  This is more on the right than the left.  It is possible she could be developing some radiculopathy, we discussed possibility of trying a steroid Dosepak but she declines.  Will refer to orthopedics.  May use over-the-counter ibuprofen 600 mg 3 times a day  - Orthopedic  Referral; Future    Morbid obesity (H)  She requested referral for weight loss management.  - Comprehensive Weight Management; Future    Chronic constipation  Discussed options for constipation, she is using fiber and still not getting soft stool but is not moving very well so needs laxative.  Suggest starting with senna, could then move up to MiraLAX, see patient instructions.  - SENNA-docusate sodium (SENNA S) 8.6-50 MG tablet; Take 2 tablets by mouth 2 times daily      Return in about 3 months (around 4/6/2023) for Physical Exam with OB gyn or [place] [facility].    Eunice Freitas MD  [facility]    Galina Messina is a 33 year old, presenting for the following health issues:      ED/UC Followup:    Facility:  [facility]  Date of visit: 12/20/22  Reason for visit:  back pain  Current Status: improved    She had initially developed some acute pain after lifting furniture, localized to the thoracic back.She reports that the pain is not as severe but it is not gone.  The pain is steady, can hurt when she lies down in certain ways, it bothers her sitting a while.  It does not bother her walking.   She has been now having a little more low back pain and some new radiation of the pain into the thighs, mostly on the right but a little on the left.  This is more of a burning sensation in the leg, does not go below the knee.    Other concerns:  She has had problems with chronic constipation for a long time.   She has tried fiber and water.  She has developed some hemorrhoids.  She still is having hard stools despite the water and fiber.  She tried MiraLAX but only 1 or 2 doses at a time.  No fever, chills, numbness, tingling in the      History of Present Illness       Back Pain:  She presents for follow up of back pain. Patient's back pain is a new problem.    Original cause of back pain: lifting  First noticed back pain: 1-4 weeks ago  Patient feels back pain: dailyLocation of back pain:  Right lower back, left lower back, right middle of back, left middle of back, right buttock, left buttock, right side of waist and left side of waist  Description of back pain: cramping and shooting  Back pain spreads: right shoulder and left shoulder    Since patient first noticed back pain, pain is: always present, but gets better and worse  Does back pain interfere with her job:  Yes  On a scale of 1-10 (10 being the worst), patient describes pain as:  7  What makes back pain worse: bending, certain positions, lying down, sitting, standing and twisting  Acupuncture: not tried  Acetaminophen: not tried  Activity or exercise: helpful  Chiropractor:  Helpful  Cold: not tried  Heat: helpful  Massage: not helpful  Muscle relaxants: not tried  NSAIDS: helpful  Opioids: not tried  Physical Therapy: not tried  Rest: helpful  Steroid Injection: not tried  Stretching: helpful  Surgery: not tried  TENS unit: not tried  Topical pain relievers: not tried  Other healthcare providers patient is seeing for back pain: None    Reason for visit:  Back pain and severe constipation    She eats 2-3 servings of fruits and vegetables daily.She consumes 1 sweetened beverage(s) daily.She exercises with enough effort to increase her heart rate 9 or less minutes per day.  She exercises with enough effort to increase her heart rate 3 or less days per week.   She is taking medications regularly.     Patient Active Problem List   Diagnosis     Foot dislocation      Headache     CARDIOVASCULAR SCREENING; LDL GOAL LESS THAN 160     Morbid obesity (H)     Current Outpatient Medications   Medication Sig Dispense Refill     SENNA-docusate sodium (SENNA S) 8.6-50 MG tablet Take 2 tablets by mouth 2 times daily 120 tablet 2            Review of Systems    legs or feet, no loss of control of bowel or bladder, no nausea, vomiting, abdominal pain      Objective    /78   Pulse (!) 130   Temp 97.1  F (36.2  C) (Tympanic)   Resp 20   Wt 141.9 kg (312 lb 14.4 oz)   SpO2 98%   BMI 52.07 kg/m    Body mass index is 52.07 kg/m .  Physical Exam     Spine: No spinal tenderness to palpation or percussion.  There is some mild tenderness of the lower thoracic paraspinal muscles.  There is some more tenderness of the lower lumbar back and muscles.  Abdomen: Soft, nontender, no hepatosplenomegaly  DTRs: 2/4  Strength: 5/5

## 2023-01-24 ENCOUNTER — OFFICE VISIT (OUTPATIENT)
Dept: NEUROSURGERY | Facility: CLINIC | Age: 33
End: 2023-01-24
Attending: INTERNAL MEDICINE
Payer: COMMERCIAL

## 2023-01-24 ENCOUNTER — ANCILLARY PROCEDURE (OUTPATIENT)
Dept: GENERAL RADIOLOGY | Facility: CLINIC | Age: 33
End: 2023-01-24
Attending: PHYSICIAN ASSISTANT
Payer: COMMERCIAL

## 2023-01-24 VITALS
BODY MASS INDEX: 48.82 KG/M2 | HEART RATE: 94 BPM | SYSTOLIC BLOOD PRESSURE: 142 MMHG | HEIGHT: 65 IN | WEIGHT: 293 LBS | OXYGEN SATURATION: 96 % | DIASTOLIC BLOOD PRESSURE: 86 MMHG

## 2023-01-24 DIAGNOSIS — M54.41 ACUTE BILATERAL LOW BACK PAIN WITH RIGHT-SIDED SCIATICA: ICD-10-CM

## 2023-01-24 PROCEDURE — G0463 HOSPITAL OUTPT CLINIC VISIT: HCPCS

## 2023-01-24 PROCEDURE — 72120 X-RAY BEND ONLY L-S SPINE: CPT | Mod: TC | Performed by: RADIOLOGY

## 2023-01-24 PROCEDURE — G0463 HOSPITAL OUTPT CLINIC VISIT: HCPCS | Performed by: PHYSICIAN ASSISTANT

## 2023-01-24 PROCEDURE — 99203 OFFICE O/P NEW LOW 30 MIN: CPT | Performed by: PHYSICIAN ASSISTANT

## 2023-01-24 ASSESSMENT — PAIN SCALES - GENERAL: PAINLEVEL: SEVERE PAIN (6)

## 2023-01-24 NOTE — PROGRESS NOTES
"Siddharth Fontanez is a 33 year old female who presents for:  Chief Complaint   Patient presents with     Consult     Acute bilateral lbp with R sided sciatica.        Initial Vitals:  BP (!) 142/86   Pulse 94   Ht 5' 5\" (1.651 m)   Wt 312 lb (141.5 kg)   SpO2 96%   BMI 51.92 kg/m   Estimated body mass index is 51.92 kg/m  as calculated from the following:    Height as of this encounter: 5' 5\" (1.651 m).    Weight as of this encounter: 312 lb (141.5 kg).. Body surface area is 2.55 meters squared. BP completed using cuff size: X-large  Severe Pain (6)    Nursing Comments:     Taylor Bonner MA    "

## 2023-01-24 NOTE — LETTER
"    1/24/2023         RE: Siddharth Fontanez  [address]  [address]  [place] [facility] [address]        Dear Colleague,    Thank you for referring your patient, Siddharth Fontanez, to the [facility]OS[facility] [place]. Please see a copy of my visit note below.    Siddharth Fontanez is a 33 year old female who presents for:  Chief Complaint   Patient presents with     Consult     Acute bilateral lbp with R sided sciatica.        Initial Vitals:  BP (!) 142/86   Pulse 94   Ht 5' 5\" (1.651 m)   Wt 312 lb (141.5 kg)   SpO2 96%   BMI 51.92 kg/m   Estimated body mass index is 51.92 kg/m  as calculated from the following:    Height as of this encounter: 5' 5\" (1.651 m).    Weight as of this encounter: 312 lb (141.5 kg).. Body surface area is 2.55 meters squared. BP completed using cuff size: X-large  Severe Pain (6)    Nursing Comments:     Taylor Bonner MA      NEUROSURGERY CLINIC CONSULT NOTE     DATE OF VISIT: 1/24/2023     SUBJECTIVE:     Siddharth Fontanez is a pleasant 33 year old female who presents to the clinic today for consultation on lumbar spine pain with right worse than left lower extremity radiculopathy. She is referred to the Neurosurgery Clinic by Dr. Freitas in Primary Care.    Today, she reports a two-month history of symptoms. She describes a daily with fluctuating intensity, sharp, aching, pain that initiates in the midline low lumbar region and radiates distally in what sounds like the right S1 distribution. This pain is accompanied by paresthesia and numbness in the same distribution. There is no weakness. Prolonged walking, standing and even sitting seems to aggravate the symptoms, while alleviation is obtained by positional changes. Lifting a table is associated with the onset of the pain. There are no bowel or bladder changes. She denies saddle anesthesia. She denies changes in gait, instability, or falling episodes.  She has not participated in conservative therapies.       Current Outpatient " "Medications:      SENNA-docusate sodium (SENNA S) 8.6-50 MG tablet, Take 2 tablets by mouth 2 times daily, Disp: 120 tablet, Rfl: 2     No Known Allergies     No past medical history on file.     ROS: 10 point review of symptoms are negative other than the symptoms noted above in the HPI.     Family History has been reviewed with the patient, there are no pertinent findings to presenting concern.     No past surgical history on file.     Social History     Tobacco Use     Smoking status: Never     Smokeless tobacco: Never     Tobacco comments:     Smoke Hoka    Substance Use Topics     Alcohol use: No     Drug use: No        OBJECTIVE:   BP (!) 142/86   Pulse 94   Ht 5' 5\" (1.651 m)   Wt 312 lb (141.5 kg)   SpO2 96%   BMI 51.92 kg/m     Body mass index is 51.92 kg/m .     Imaging:     XR LUMBAR SPINE 2/3 VIEWS - 12/20/2022 4:56 PM    Findings, per radiologist read, notable for:      Nomenclature is based on five lumbar-type vertebral bodies.  Vertebral body heights are unremarkable.  Alignment is unremarkable.  Mild to moderate L5-S1 intervertebral disc height loss suggests underlying spondylosis.  Pedicles appear symmetric.    Full radiological report in chart. Imaging was reviewed with with patient today.     Exam:     Patient appears comfortable, conversational, and in no apparent distress.   Head: Normocephalic, without obvious abnormality, atraumatic, no facial asymmetry.   Eyes: conjunctivae/corneas clear. PERRL, EOM's intact.   Throat: lips, mucosa, and tongue normal; teeth and gums normal.   Neck: supple, symmetrical, trachea midline, no adenopathy and thyroid: not enlarged, symmetric, no tenderness/mass/nodules.   Lungs: clear to auscultation bilaterally.   Heart: regular rate and rhythm.   Abdomen: soft, non-tender; bowel sounds normal; no masses, no organomegaly.   Pulses: 2+ and symmetric.   Skin: Skin color, texture, turgor normal. No rashes or lesions.     CN II-XII grossly intact, alert and " appropriate with conversation and following commands.   Gait is non-antalgic. Able to tandem walk. Able to walk on toes and heels without difficulty.   Cervical spine is non tender to palpation. Appropriate range of motion of neck, not concerning for lhermitte's phenomenon.   Bilateral bicep 2/4 and tricep reflexes 1/4. Sensation intact throughout upper extremities.     UE muscle strength  Right  Left    Deltoid  5/5  5/5    Biceps  5/5  5/5    Triceps  5/5  5/5    Hand intrinsics  5/5  5/5    Hand grasp  5/5  5/5    Silverman signs  neg  neg      Lumbar spine is non tender to palpation.  Intact sensation throughout lower extremities.   Bilateral patellar 2/4 and achilles reflex 1/4. Negative for pain with straight leg raise.     LE muscle strength  Right  Left    Iliopsoas (hip flexion)  5/5  5/5    Quad (knee extension)  5/5  5/5    Hamstring (knee flexion)  5/5  5/5    Gastrocnemius (PF)  5/5  5/5    Tibialis Ant. (DF)  5/5  5/5    EHL  5/5  5/5      Negative Babinski bilaterally. Negative for clonus.   Calves are soft and non-tender bilaterally.       ASSESSMENT/PLAN:     Siddharth Fontanez is a 33 year old female who presents to the clinic for consultation on a two-month history of a daily with fluctuating intensity, sharp, aching, pain that initiates in the midline low lumbar region and radiates distally in what sounds like the right S1 distribution. This pain is accompanied by paresthesia and numbness in the same distribution. There is no weakness. The patient's most recent imaging was reviewed with her today. It was explained that images show mild to moderate L5-S1 intervertebral disc height loss suggests underlying spondylosis, which correlates to today's clinical examination. On exam, she is noted to have appropriate strength, sensation and range of motion. She has not attempted conservative management.     Based on her physical exam, imaging review and past treatments, we feel that it would be in Ms. Fontanez's best  interest to obtain lumbar x-rays to include flexion and extension views as well as try a conservative approach by participating in a program initiated by our colleagues at the [facility] [place]. She did inquire about possible treatment options that they may consider so we briefly discussed core stretching and strengthening exercises in conjunction with lumbar traction as possibilities.    Should she fail to obtain adequate alleviation of her symptoms utilizing the above measures, a lumbar MRI WO should be considered.     We did review the images together and we explained her condition and the recommended treatment. We also discussed signs of a worsening problem that she should seek being evaluated.        Respectfully,     DIDIER Velez, GUERO  [facility]  [facility]  Tel: [phone]      Exam, imaging, and plan reviewed by Dr. Odonnell.       Again, thank you for allowing me to participate in the care of your patient.        Sincerely,        Jose Schofield PA-C

## 2023-01-24 NOTE — PROGRESS NOTES
"NEUROSURGERY CLINIC CONSULT NOTE     DATE OF VISIT: 1/24/2023     SUBJECTIVE:     Siddharth Fontanez is a pleasant 33 year old female who presents to the clinic today for consultation on lumbar spine pain with right worse than left lower extremity radiculopathy. She is referred to the Neurosurgery Clinic by Dr. Freitas in Primary Care.    Today, she reports a two-month history of symptoms. She describes a daily with fluctuating intensity, sharp, aching, pain that initiates in the midline low lumbar region and radiates distally in what sounds like the right S1 distribution. This pain is accompanied by paresthesia and numbness in the same distribution. There is no weakness. Prolonged walking, standing and even sitting seems to aggravate the symptoms, while alleviation is obtained by positional changes. Lifting a table is associated with the onset of the pain. There are no bowel or bladder changes. She denies saddle anesthesia. She denies changes in gait, instability, or falling episodes.  She has not participated in conservative therapies.       Current Outpatient Medications:      SENNA-docusate sodium (SENNA S) 8.6-50 MG tablet, Take 2 tablets by mouth 2 times daily, Disp: 120 tablet, Rfl: 2     No Known Allergies     No past medical history on file.     ROS: 10 point review of symptoms are negative other than the symptoms noted above in the HPI.     Family History has been reviewed with the patient, there are no pertinent findings to presenting concern.     No past surgical history on file.     Social History     Tobacco Use     Smoking status: Never     Smokeless tobacco: Never     Tobacco comments:     Smoke Hoka    Substance Use Topics     Alcohol use: No     Drug use: No        OBJECTIVE:   BP (!) 142/86   Pulse 94   Ht 5' 5\" (1.651 m)   Wt 312 lb (141.5 kg)   SpO2 96%   BMI 51.92 kg/m     Body mass index is 51.92 kg/m .     Imaging:     XR LUMBAR SPINE 2/3 VIEWS - 12/20/2022 4:56 PM    Findings, per radiologist " read, notable for:      Nomenclature is based on five lumbar-type vertebral bodies.  Vertebral body heights are unremarkable.  Alignment is unremarkable.  Mild to moderate L5-S1 intervertebral disc height loss suggests underlying spondylosis.  Pedicles appear symmetric.    Full radiological report in chart. Imaging was reviewed with with patient today.     Exam:     Patient appears comfortable, conversational, and in no apparent distress.   Head: Normocephalic, without obvious abnormality, atraumatic, no facial asymmetry.   Eyes: conjunctivae/corneas clear. PERRL, EOM's intact.   Throat: lips, mucosa, and tongue normal; teeth and gums normal.   Neck: supple, symmetrical, trachea midline, no adenopathy and thyroid: not enlarged, symmetric, no tenderness/mass/nodules.   Lungs: clear to auscultation bilaterally.   Heart: regular rate and rhythm.   Abdomen: soft, non-tender; bowel sounds normal; no masses, no organomegaly.   Pulses: 2+ and symmetric.   Skin: Skin color, texture, turgor normal. No rashes or lesions.     CN II-XII grossly intact, alert and appropriate with conversation and following commands.   Gait is non-antalgic. Able to tandem walk. Able to walk on toes and heels without difficulty.   Cervical spine is non tender to palpation. Appropriate range of motion of neck, not concerning for lhermitte's phenomenon.   Bilateral bicep 2/4 and tricep reflexes 1/4. Sensation intact throughout upper extremities.     UE muscle strength  Right  Left    Deltoid  5/5  5/5    Biceps  5/5  5/5    Triceps  5/5  5/5    Hand intrinsics  5/5  5/5    Hand grasp  5/5  5/5    Silverman signs  neg  neg      Lumbar spine is non tender to palpation.  Intact sensation throughout lower extremities.   Bilateral patellar 2/4 and achilles reflex 1/4. Negative for pain with straight leg raise.     LE muscle strength  Right  Left    Iliopsoas (hip flexion)  5/5  5/5    Quad (knee extension)  5/5  5/5    Hamstring (knee flexion)  5/5  5/5     Gastrocnemius (PF)  5/5  5/5    Tibialis Ant. (DF)  5/5  5/5    EHL  5/5  5/5      Negative Babinski bilaterally. Negative for clonus.   Calves are soft and non-tender bilaterally.       ASSESSMENT/PLAN:     Siddharth Fontanez is a 33 year old female who presents to the clinic for consultation on a two-month history of a daily with fluctuating intensity, sharp, aching, pain that initiates in the midline low lumbar region and radiates distally in what sounds like the right S1 distribution. This pain is accompanied by paresthesia and numbness in the same distribution. There is no weakness. The patient's most recent imaging was reviewed with her today. It was explained that images show mild to moderate L5-S1 intervertebral disc height loss suggests underlying spondylosis, which correlates to today's clinical examination. On exam, she is noted to have appropriate strength, sensation and range of motion. She has not attempted conservative management.     Based on her physical exam, imaging review and past treatments, we feel that it would be in Ms. Fontanez's best interest to obtain lumbar x-rays to include flexion and extension views as well as try a conservative approach by participating in a program initiated by our colleagues at the [facility] [place]. She did inquire about possible treatment options that they may consider so we briefly discussed core stretching and strengthening exercises in conjunction with lumbar traction as possibilities.    Should she fail to obtain adequate alleviation of her symptoms utilizing the above measures, a lumbar MRI WO should be considered.     We did review the images together and we explained her condition and the recommended treatment. We also discussed signs of a worsening problem that she should seek being evaluated.        Respectfully,     DIDIER Velez, GUERO  [facility]  [facility]  Tel:  [phone]      Exam, imaging, and plan reviewed by Dr. Odonnell.

## 2023-01-27 ENCOUNTER — OFFICE VISIT (OUTPATIENT)
Dept: URGENT CARE | Facility: URGENT CARE | Age: 33
End: 2023-01-27
Payer: COMMERCIAL

## 2023-01-27 VITALS — TEMPERATURE: 98.1 F | OXYGEN SATURATION: 99 % | SYSTOLIC BLOOD PRESSURE: 120 MMHG | DIASTOLIC BLOOD PRESSURE: 70 MMHG

## 2023-01-27 DIAGNOSIS — R53.83 OTHER FATIGUE: ICD-10-CM

## 2023-01-27 DIAGNOSIS — G47.00 INSOMNIA, UNSPECIFIED TYPE: ICD-10-CM

## 2023-01-27 DIAGNOSIS — N93.9 ABNORMAL UTERINE BLEEDING: Primary | ICD-10-CM

## 2023-01-27 LAB
ANION GAP SERPL CALCULATED.3IONS-SCNC: 6 MMOL/L (ref 3–14)
BASOPHILS # BLD AUTO: 0 10E3/UL (ref 0–0.2)
BASOPHILS NFR BLD AUTO: 0 %
BUN SERPL-MCNC: 9 MG/DL (ref 7–30)
CALCIUM SERPL-MCNC: 8.8 MG/DL (ref 8.5–10.1)
CHLORIDE BLD-SCNC: 102 MMOL/L (ref 94–109)
CO2 SERPL-SCNC: 29 MMOL/L (ref 20–32)
CREAT SERPL-MCNC: 0.8 MG/DL (ref 0.52–1.04)
EOSINOPHIL # BLD AUTO: 0.2 10E3/UL (ref 0–0.7)
EOSINOPHIL NFR BLD AUTO: 2 %
ERYTHROCYTE [DISTWIDTH] IN BLOOD BY AUTOMATED COUNT: 15.4 % (ref 10–15)
FLUAV AG SPEC QL IA: NEGATIVE
FLUBV AG SPEC QL IA: NEGATIVE
GFR SERPL CREATININE-BSD FRML MDRD: >90 ML/MIN/1.73M2
GLUCOSE BLD-MCNC: 108 MG/DL (ref 70–99)
HCT VFR BLD AUTO: 39.9 % (ref 35–47)
HGB BLD-MCNC: 12.4 G/DL (ref 11.7–15.7)
LYMPHOCYTES # BLD AUTO: 2.5 10E3/UL (ref 0.8–5.3)
LYMPHOCYTES NFR BLD AUTO: 24 %
MCH RBC QN AUTO: 26.6 PG (ref 26.5–33)
MCHC RBC AUTO-ENTMCNC: 31.1 G/DL (ref 31.5–36.5)
MCV RBC AUTO: 86 FL (ref 78–100)
MONOCYTES # BLD AUTO: 0.5 10E3/UL (ref 0–1.3)
MONOCYTES NFR BLD AUTO: 5 %
NEUTROPHILS # BLD AUTO: 6.9 10E3/UL (ref 1.6–8.3)
NEUTROPHILS NFR BLD AUTO: 68 %
PLATELET # BLD AUTO: 402 10E3/UL (ref 150–450)
POTASSIUM BLD-SCNC: 3.9 MMOL/L (ref 3.4–5.3)
RBC # BLD AUTO: 4.66 10E6/UL (ref 3.8–5.2)
SODIUM SERPL-SCNC: 137 MMOL/L (ref 133–144)
WBC # BLD AUTO: 10.1 10E3/UL (ref 4–11)

## 2023-01-27 PROCEDURE — 36415 COLL VENOUS BLD VENIPUNCTURE: CPT | Performed by: PHYSICIAN ASSISTANT

## 2023-01-27 PROCEDURE — 85025 COMPLETE CBC W/AUTO DIFF WBC: CPT | Performed by: PHYSICIAN ASSISTANT

## 2023-01-27 PROCEDURE — 80048 BASIC METABOLIC PNL TOTAL CA: CPT | Performed by: PHYSICIAN ASSISTANT

## 2023-01-27 PROCEDURE — U0005 INFEC AGEN DETEC AMPLI PROBE: HCPCS | Performed by: PHYSICIAN ASSISTANT

## 2023-01-27 PROCEDURE — 87804 INFLUENZA ASSAY W/OPTIC: CPT | Performed by: PHYSICIAN ASSISTANT

## 2023-01-27 PROCEDURE — U0003 INFECTIOUS AGENT DETECTION BY NUCLEIC ACID (DNA OR RNA); SEVERE ACUTE RESPIRATORY SYNDROME CORONAVIRUS 2 (SARS-COV-2) (CORONAVIRUS DISEASE [COVID-19]), AMPLIFIED PROBE TECHNIQUE, MAKING USE OF HIGH THROUGHPUT TECHNOLOGIES AS DESCRIBED BY CMS-2020-01-R: HCPCS | Performed by: PHYSICIAN ASSISTANT

## 2023-01-27 PROCEDURE — 99214 OFFICE O/P EST MOD 30 MIN: CPT | Mod: CS | Performed by: PHYSICIAN ASSISTANT

## 2023-01-27 RX ORDER — HYDROXYZINE HYDROCHLORIDE 25 MG/1
25 TABLET, FILM COATED ORAL
Qty: 8 TABLET | Refills: 0 | Status: SHIPPED | OUTPATIENT
Start: 2023-01-27 | End: 2023-07-19

## 2023-01-27 NOTE — LETTER
[facility] [place]  [address]  [address]  [place] [address]  Phone: [phone]  Fax: [phone]    January 27, 2023        Siddharth Fontanez  [address]    [place] [address]          To whom it may concern:    RE: Siddharth Fontanez    Patient was seen and treated today at our clinic. Please excuse absences on 1/30 and 1/31/23.    Please contact me for questions or concerns.      Sincerely,        Eric Arguelles PA-C

## 2023-01-28 NOTE — PROGRESS NOTES
SUBJECTIVE  HPI:  Siddharth Fontanez is a 33 year old female who presents with the CC of persisting period. Has felt intermittently and mildly light headed. Notes her fall in shower was due to slip, not weakness. No pain. No sexual activity in last 10 months.      Also has had trouble sleeping the last week, would like medication to aid her initiating sleep.       No past medical history on file.  Current Outpatient Medications   Medication Sig Dispense Refill     hydrOXYzine (ATARAX) 25 MG tablet Take 1 tablet (25 mg) by mouth nightly as needed for other (sleeping) 8 tablet 0     SENNA-docusate sodium (SENNA S) 8.6-50 MG tablet Take 2 tablets by mouth 2 times daily 120 tablet 2     Social History     Tobacco Use     Smoking status: Never     Smokeless tobacco: Never     Tobacco comments:     Smoke Hoka    Substance Use Topics     Alcohol use: No       ROS:  Review of systems negative except as stated above.    OBJECTIVE:  /70   Temp 98.1  F (36.7  C) (Temporal)   SpO2 99%   GENERAL APPEARANCE: healthy, alert and no distress  RESP: lungs clear to auscultation - no rales, rhonchi or wheezes  CV: regular rates and rhythm, normal S1 S2, no murmur noted  ABDOMEN:  soft, nontender, no HSM or masses and bowel sounds normal  NEURO: Normal strength and tone, sensory exam grossly normal,  normal speech and mentation  SKIN: no suspicious lesions or rashes      Results for orders placed or performed in visit on 01/27/23   Basic metabolic panel  (Ca, Cl, CO2, Creat, Gluc, K, Na, BUN)     Status: Abnormal   Result Value Ref Range    Sodium 137 133 - 144 mmol/L    Potassium 3.9 3.4 - 5.3 mmol/L    Chloride 102 94 - 109 mmol/L    Carbon Dioxide (CO2) 29 20 - 32 mmol/L    Anion Gap 6 3 - 14 mmol/L    Urea Nitrogen 9 7 - 30 mg/dL    Creatinine 0.80 0.52 - 1.04 mg/dL    Calcium 8.8 8.5 - 10.1 mg/dL    Glucose 108 (H) 70 - 99 mg/dL    GFR Estimate >90 >60 mL/min/1.73m2   CBC with platelets and differential     Status: Abnormal    Result Value Ref Range    WBC Count 10.1 4.0 - 11.0 10e3/uL    RBC Count 4.66 3.80 - 5.20 10e6/uL    Hemoglobin 12.4 11.7 - 15.7 g/dL    Hematocrit 39.9 35.0 - 47.0 %    MCV 86 78 - 100 fL    MCH 26.6 26.5 - 33.0 pg    MCHC 31.1 (L) 31.5 - 36.5 g/dL    RDW 15.4 (H) 10.0 - 15.0 %    Platelet Count 402 150 - 450 10e3/uL    % Neutrophils 68 %    % Lymphocytes 24 %    % Monocytes 5 %    % Eosinophils 2 %    % Basophils 0 %    Absolute Neutrophils 6.9 1.6 - 8.3 10e3/uL    Absolute Lymphocytes 2.5 0.8 - 5.3 10e3/uL    Absolute Monocytes 0.5 0.0 - 1.3 10e3/uL    Absolute Eosinophils 0.2 0.0 - 0.7 10e3/uL    Absolute Basophils 0.0 0.0 - 0.2 10e3/uL   Influenza A/B antigen     Status: Normal    Specimen: Nose; Swab   Result Value Ref Range    Influenza A antigen Negative Negative    Influenza B antigen Negative Negative    Narrative    Test results must be correlated with clinical data. If necessary, results should be confirmed by a molecular assay or viral culture.   CBC with platelets and differential     Status: Abnormal    Narrative    The following orders were created for panel order CBC with platelets and differential.  Procedure                               Abnormality         Status                     ---------                               -----------         ------                     CBC with platelets and d...[[id number]]  Abnormal            Final result                 Please view results for these tests on the individual orders.       ASSESSMENT:  (N93.9) Abnormal uterine bleeding  (primary encounter diagnosis)  Plan: CBC with platelets and differential, Basic         metabolic panel  (Ca, Cl, CO2, Creat, Gluc, K,         Na, BUN), Symptomatic COVID-19 Virus         (Coronavirus) by PCR, Influenza A/B antigen      (R53.83) Other fatigue  Plan: CBC with platelets and differential, Basic         metabolic panel  (Ca, Cl, CO2, Creat, Gluc, K,         Na, BUN), Symptomatic COVID-19 Virus         (Coronavirus)  by PCR, Influenza A/B antigen      (G47.00) Insomnia, unspecified type  Plan: hydrOXYzine (ATARAX) 25 MG tablet          Patient Instructions   Rest, hydrate    Follow up with GYN next week    ER if any symptoms worsen

## 2023-01-29 LAB — SARS-COV-2 RNA RESP QL NAA+PROBE: NEGATIVE

## 2023-02-03 ENCOUNTER — THERAPY VISIT (OUTPATIENT)
Dept: PHYSICAL THERAPY | Facility: CLINIC | Age: 33
End: 2023-02-03
Attending: PHYSICIAN ASSISTANT
Payer: COMMERCIAL

## 2023-02-03 DIAGNOSIS — M54.41 ACUTE BILATERAL LOW BACK PAIN WITH RIGHT-SIDED SCIATICA: ICD-10-CM

## 2023-02-03 PROCEDURE — 97161 PT EVAL LOW COMPLEX 20 MIN: CPT | Mod: GP | Performed by: PHYSICAL THERAPIST

## 2023-02-03 PROCEDURE — 97110 THERAPEUTIC EXERCISES: CPT | Mod: GP | Performed by: PHYSICAL THERAPIST

## 2023-02-03 NOTE — PROGRESS NOTES
Physical Therapy Initial Evaluation  Subjective:  The history is provided by the patient. No  was used.   Patient Health History  Siddharth Fontanez being seen for Back pain.     Date of Onset: 1/1/2023.   Problem occurred: lifting   Pain is reported as 4/10, 5/10, 6/10 and 7/10 on pain scale.  General health as reported by patient is fair.  Pertinent medical history includes: none.     Medical allergies: none.   Surgeries include:  None.    Current medications:  None.    Current occupation is customer service.   Primary job tasks include:  Computer work.                  Therapist Generated HPI Evaluation  Problem details: Pt notes that on or around 1/1/2023, pt was lifting a coffee table around her room doing some remodeling and noticed a pain in her back.  After that the pain started to go down her R leg, and then she went into urgent care.  Pt was given some medications which helped.  Muscle relaxant, anti-inflammatories, which helped temporarily.      This was her first episode of low back pain..         Type of problem:  Lumbar.          Patient reports pain:  Central lumbar spine.  Pain is described as sharp (tingling down to R toes) and is intermittent.  Pain radiates to:  Gluteals right. Pain is the same all the time.  Since onset symptoms are gradually improving.  Exacerbated by: maybe pressure on it? but then felt worse afterwards.    Special tests included:  X-ray.    Restrictions due to condition include:  Working in normal job without restrictions.  Barriers include:  None as reported by patient.                        Objective:  System         Lumbar/SI Evaluation  ROM:      Strength: core strength: poor                                                                   Faheem Lumbar Evaluation    Posture:  Sitting: fair  Standing: fair    Lateral Shift: no      Movement Loss:  Flexion (Flex): min and pain  Extension (EXT): min, mod and pain  Side [place] R (SG R): min and pain  Side  Glide L (SG L): min and pain  Test Movements:  FIS:   Repeat FIS: During: decreases  After: better        EIL:   Repeat EIL: During: produces  After: worse          Conclusion: derangement  Principle of Treatment:    Flexion: x                                             ROS    Assessment/Plan:    Patient is a 33 year old female with lumbar complaints.    Patient has the following significant findings with corresponding treatment plan.                Diagnosis 1:  Low back pain      Pain -  hot/cold therapy, manual therapy, self management, education, directional preference exercise and home program  Decreased ROM/flexibility - manual therapy and therapeutic exercise  Decreased strength - therapeutic exercise and therapeutic activities  Impaired muscle performance - neuro re-education  Decreased function - therapeutic activities  Impaired posture - neuro re-education    Therapy Evaluation Codes:   1) History comprised of:   Personal factors that impact the plan of care:      None.    Comorbidity factors that impact the plan of care are:      None.     Medications impacting care: None.  2) Examination of Body Systems comprised of:   Body structures and functions that impact the plan of care:      Lumbar spine.   Activity limitations that impact the plan of care are:      Sitting, Squatting/kneeling, Standing, Walking and Working.  3) Clinical presentation characteristics are:   Evolving/Changing.  4) Decision-Making    Low complexity using standardized patient assessment instrument and/or measureable assessment of functional outcome.  Cumulative Therapy Evaluation is: Low complexity.    Previous and current functional limitations:  (See Goal Flow Sheet for this information)    Short term and Long term goals: (See Goal Flow Sheet for this information)     Communication ability:  Patient appears to be able to clearly communicate and understand verbal and written communication and follow directions correctly.  Treatment  Explanation - The following has been discussed with the patient:   RX ordered/plan of care  Anticipated outcomes  Possible risks and side effects  This patient would benefit from PT intervention to resume normal activities.   Rehab potential is good.    Frequency:  1 X week, once daily  Duration:  for 8 weeks  Discharge Plan:  Achieve all LTG.  Independent in home treatment program.  Reach maximal therapeutic benefit.    Please refer to the daily flowsheet for treatment today, total treatment time and time spent performing 1:1 timed codes.

## 2023-02-03 NOTE — PROGRESS NOTES
[facility]    OUTPATIENT Physical Therapy ORTHOPEDIC EVALUATION  PLAN OF TREATMENT FOR OUTPATIENT REHABILITATION  (COMPLETE FOR INITIAL CLAIMS ONLY)  Patient's Last Name, First Name, M.I.  YOB: 1990  Siddharth Fontanez    Provider s Name:  [facility]   Medical Record No.  [id number]   Start of Care Date:  02/03/23   Onset Date:   01/24/23   Treatment Diagnosis:  Low back pain Medical Diagnosis:  Acute bilateral low back pain with right-sided sciatica       Goals:     02/03/23 0500   Body Part   Goals listed below are for low back   Goal #1   Goal #1 bending   Previous Functional Level No restrictions   Current Functional Level Can bend until hands reach midlower leg   Performance level PL 4/10   STG Target Performance Bend until hands reach toes   Performance level PL 2/10   Rationale for dressing LE   Due date 03/03/23   LTG Target Performance Unrestricted bending   Rationale for dressing LE   Due date 03/31/23         Therapy Frequency:  1x/week  Predicted Duration of Therapy Intervention:  8 weeks    Michael Chacon PT                 I CERTIFY THE NEED FOR THESE SERVICES FURNISHED UNDER        THIS PLAN OF TREATMENT AND WHILE UNDER MY CARE     (Physician attestation of this document indicates review and certification of the therapy plan).                     Certification Date From:  02/03/23   Certification Date To:  03/31/23    Referring Provider:  Jose Schofield    Initial Assessment        See Epic Evaluation SOC Date: 02/03/23

## 2023-02-10 ENCOUNTER — OFFICE VISIT (OUTPATIENT)
Dept: OBGYN | Facility: CLINIC | Age: 33
End: 2023-02-10
Payer: COMMERCIAL

## 2023-02-10 VITALS — WEIGHT: 293 LBS | BODY MASS INDEX: 52.59 KG/M2 | DIASTOLIC BLOOD PRESSURE: 76 MMHG | SYSTOLIC BLOOD PRESSURE: 104 MMHG

## 2023-02-10 DIAGNOSIS — N93.9 ABNORMAL UTERINE BLEEDING: Primary | ICD-10-CM

## 2023-02-10 PROBLEM — G43.909 MIGRAINE: Status: ACTIVE | Noted: 2022-08-10

## 2023-02-10 PROBLEM — R87.612 LOW GRADE SQUAMOUS INTRAEPITHELIAL LESION ON CYTOLOGIC SMEAR OF CERVIX (LGSIL): Status: ACTIVE | Noted: 2022-08-25

## 2023-02-10 PROCEDURE — 99203 OFFICE O/P NEW LOW 30 MIN: CPT | Performed by: OBSTETRICS & GYNECOLOGY

## 2023-02-10 RX ORDER — DESOGESTREL AND ETHINYL ESTRADIOL 0.15-0.03
1 KIT ORAL DAILY
Qty: 84 TABLET | Refills: 3 | Status: SHIPPED | OUTPATIENT
Start: 2023-02-10 | End: 2024-01-08

## 2023-02-10 NOTE — PROGRESS NOTES
SUBJECTIVE:                                                   Siddharth Fontanez is a 33 year old female who presents to clinic today for the following health issue(s):  Patient presents with:  Abnormal Uterine Bleeding      HPI:  33-year-old nulliparous female not presently sexually active presents for evaluation of prolonged vaginal bleeding.  She endorses menarche at approximately age 11 and states she has never been particularly irregular.  She typically will go to 2 to 3 months between cycles.  Her most recent menstruation lasted 16 days but has now stopped.    Patient's medical history is notable for morbid obesity with a BMI of 52.  She has initiated the process of evaluation for bariatric surgery.  She is aware that significant weight loss will likely improve her cycle pattern.     Patient's last menstrual period was 01/17/2023..   Patient is not sexually active, No obstetric history on file..  Using none for contraception.     Problem list and histories reviewed & adjusted, as indicated.  Additional history: as documented.    Patient Active Problem List   Diagnosis     Foot dislocation     Headache     CARDIOVASCULAR SCREENING; LDL GOAL LESS THAN 160     Morbid obesity (H)     Acute bilateral low back pain with right-sided sciatica     Low grade squamous intraepithelial lesion on cytologic smear of cervix (LGSIL)     Migraine     Past Surgical History:   Procedure Laterality Date     TONSILLECTOMY  1995    approx      Social History     Tobacco Use     Smoking status: Never     Smokeless tobacco: Never     Tobacco comments:     Smoke [place]    Substance Use Topics     Alcohol use: No      Problem (# of Occurrences) Relation (Name,Age of Onset)    Diabetes (1) Mother    Hypertension (1) Mother            hydrOXYzine (ATARAX) 25 MG tablet, Take 1 tablet (25 mg) by mouth nightly as needed for other (sleeping)  SENNA-docusate sodium (SENNA S) 8.6-50 MG tablet, Take 2 tablets by mouth 2 times daily    No current  facility-administered medications on file prior to visit.    No Known Allergies    ROS:  5 point ROS negative except as noted above in HPI, including Gen., Resp., CV, GI &  system review.    OBJECTIVE:     /76   Wt 143.3 kg (316 lb)   LMP 01/17/2023   BMI 52.59 kg/m     BMI: Body mass index is 52.59 kg/m .  General: Alert and oriented, no distress.  Psychiatric: Mood and affect within normal limits.  Skin: Warm and dry, no lesions, rashes or discolorations.    Abdomen:Obese, Soft, nontender, no hepatosplenomegaly, no rebound or guarding, no masses, no hernias.   Vulva:  No external lesions, normal female hair distribution, no inguinal adenopathy.    Urethra:  Midline, non-tender, well supported, no discharge  Vagina:  Well-estrogenized, no abnormal discharge, no lesions  Cervix: no lesions, no discharge and nulliparous  Uterus:  anteverted, smooth contour, without enlargement, mobile, and without tenderness and exam limited by body habitus  Ovaries:  No masses appreciated, non-tender, mobile  Rectal Exam: deferred  Musculoskeletal: extremities normal        ASSESSMENT/PLAN:                                                        ICD-10-CM    1. Abnormal uterine bleeding  N93.9 US Pelvic Transabdominal and Transvaginal            Discussed both physiologic and anatomic causes of irregular bleeding.  Her history suggests old ovulation and dysfunctional bleeding.  She is amenable to starting oral contraceptives for cycle regulation which she would plan on continuing until bariatric surgery and significant weight loss.  She is aware that there is declining fertility with advancing maternal age.    We will schedule a pelvic ultrasound to rule out any anatomic contributors to her irregular bleeding.  I will contact her with the results when they are available.    Prescription for Apri oral contraceptives to begin immediately was sent to the pharmacy    Michael Liu MD  [employer]

## 2023-02-14 ENCOUNTER — ANCILLARY PROCEDURE (OUTPATIENT)
Dept: ULTRASOUND IMAGING | Facility: CLINIC | Age: 33
End: 2023-02-14
Attending: OBSTETRICS & GYNECOLOGY
Payer: COMMERCIAL

## 2023-02-14 DIAGNOSIS — N93.9 ABNORMAL UTERINE BLEEDING: ICD-10-CM

## 2023-02-14 PROCEDURE — 76856 US EXAM PELVIC COMPLETE: CPT | Performed by: OBSTETRICS & GYNECOLOGY

## 2023-02-14 PROCEDURE — 76830 TRANSVAGINAL US NON-OB: CPT | Performed by: OBSTETRICS & GYNECOLOGY

## 2023-02-27 ENCOUNTER — THERAPY VISIT (OUTPATIENT)
Dept: PHYSICAL THERAPY | Facility: CLINIC | Age: 33
End: 2023-02-27
Payer: COMMERCIAL

## 2023-02-27 DIAGNOSIS — M54.41 ACUTE BILATERAL LOW BACK PAIN WITH RIGHT-SIDED SCIATICA: Primary | ICD-10-CM

## 2023-02-27 PROCEDURE — 97112 NEUROMUSCULAR REEDUCATION: CPT | Mod: GP | Performed by: PHYSICAL THERAPIST

## 2023-02-27 PROCEDURE — 97110 THERAPEUTIC EXERCISES: CPT | Mod: GP | Performed by: PHYSICAL THERAPIST

## 2023-02-27 NOTE — PROGRESS NOTES
PROGRESS  REPORT    Progress reporting period is from 2/3/2023 to 2/27/2023.       SUBJECTIVE  She feels it is not as bad as it was, however the pain is still there.  The worst couple of movements right now are sitting and lieing. With flexion she might get a few minutes of reduction of symptoms. She tried a stretch (patient santino rodriguez) suggested by a friend that gave her 10 minutes of relief.    Current Pain level: 5/10.     Initial Pain level: 7/10.   Changes in function:  Yes, decreased level of pain.   Adverse reaction to treatment or activity: None    OBJECTIVE  AROM lumbar: Flex: fingertips to toes with pull in lumbar spine, Ex: 50% with increased pain in lumbar spine, RSB: Full with pain, LSB: full with pain, Rotation B: Full range with reports of good relieving stretch. palpation: painful and tight to palpation of B Quadratus lumborum and Glute Max B.     ASSESSMENT/PLAN  Updated problem list and treatment plan: Diagnosis 1:  Low Back Pain  Pain -  hot/cold therapy, electric stimulation, mechanical traction, manual therapy, splint/taping/bracing/orthotics, self management, education, directional preference exercise, home program and Dry Needling  Decreased ROM/flexibility - manual therapy, therapeutic exercise, therapeutic activity and home program  Decreased joint mobility - manual therapy, therapeutic exercise, therapeutic activity and home program  Decreased strength - therapeutic exercise, therapeutic activities and home program  Impaired balance - neuro re-education, gait training, therapeutic activities, adaptive equipment/assistive device and home program  Decreased proprioception - neuro re-education, gait training, therapeutic activities and home program  Inflammation - cold therapy, electric stimulation and self management/home program  Impaired gait - gait training, assistive devices and home program  Impaired muscle performance - neuro re-education and home program  Decreased function -  therapeutic activities and home program  STG/LTGs have been met or progress has been made towards goals:  Yes (See Goal flow sheet completed today.)  Assessment of Progress: The patient's condition is improving.  Self Management Plans:  Patient has been instructed in a home treatment program.  I have re-evaluated this patient and find that the nature, scope, duration and intensity of the therapy is appropriate for the medical condition of the patient.  Siddharth continues to require the following intervention to meet STG and LTG's:  PT    Recommendations:  This patient would benefit from continued therapy.     Frequency:  1 X week, once daily  Duration:  for 6 weeks        Please refer to the daily flowsheet for treatment today, total treatment time and time spent performing 1:1 timed codes.

## 2023-02-27 NOTE — PROGRESS NOTES
[facility]    OUTPATIENT Physical Therapy ORTHOPEDIC EVALUATION  PLAN OF TREATMENT FOR OUTPATIENT REHABILITATION  (COMPLETE FOR INITIAL CLAIMS ONLY)  Patient's Last Name, First Name, M.I.  YOB: 1990  Siddharth Fontanez    Provider s Name:  [facility]   Medical Record No.  [id number]   Start of Care Date:  02/03/23   Onset Date:   01/24/23   Treatment Diagnosis:  Low back pain Medical Diagnosis:  Acute bilateral low back pain with right-sided sciatica       Goals:     02/27/23 1720   Body Part   Goals listed below are for low back   Goal #1   Goal #1 bending   Previous Functional Level No restrictions   Current Functional Level Can bend until hands reach midlower leg   Performance level PL 4/10   STG Target Performance Bend until hands reach toes   Performance level PL 2/10   Rationale for dressing LE   Due date 03/20/23   LTG Target Performance Unrestricted bending   Rationale for dressing LE   Due date 04/10/23         Therapy Frequency:  1x/week  Predicted Duration of Therapy Intervention:  6 weeks    Meera Melton, PT                 I CERTIFY THE NEED FOR THESE SERVICES FURNISHED UNDER        THIS PLAN OF TREATMENT AND WHILE UNDER MY CARE     (Physician co-signature of this document indicates review and certification of the therapy plan).                     Certification Date From:  02/27/23   Certification Date To:  04/10/23    Referring Provider:  Jose Schofield    Initial Assessment        See Epic Evaluation SOC Date: 02/03/23

## 2023-03-02 ENCOUNTER — OFFICE VISIT (OUTPATIENT)
Dept: FAMILY MEDICINE | Facility: CLINIC | Age: 33
End: 2023-03-02
Payer: COMMERCIAL

## 2023-03-02 VITALS
OXYGEN SATURATION: 100 % | DIASTOLIC BLOOD PRESSURE: 78 MMHG | HEART RATE: 65 BPM | SYSTOLIC BLOOD PRESSURE: 112 MMHG | BODY MASS INDEX: 35.17 KG/M2 | WEIGHT: 206 LBS | TEMPERATURE: 97.6 F | HEIGHT: 64 IN | RESPIRATION RATE: 16 BRPM

## 2023-03-02 DIAGNOSIS — H61.20 WAX IN EAR: Primary | ICD-10-CM

## 2023-03-02 PROCEDURE — 69210 REMOVE IMPACTED EAR WAX UNI: CPT | Mod: RT | Performed by: FAMILY MEDICINE

## 2023-03-02 ASSESSMENT — PAIN SCALES - GENERAL: PAINLEVEL: NO PAIN (0)

## 2023-03-02 NOTE — PROGRESS NOTES
"  Assessment & Plan     Wax in ear  Irritated by impacted wax on right ear, removed with ENT alligator forceps and flushed with water without complication   Encouraged her to try OTC wax removal liquid preventatively   - REMOVAL OF IMPACTED WAX MD           FUTURE APPOINTMENTS:       - Follow-up visit in 10 months for CPE    No follow-ups on file.    Tacho Horne MD  [facility] YARELI Figueroa is a 33 year old presenting for the following health issues:  No chief complaint on file.      History of Present Illness       Reason for visit:  Ear  Symptom onset:  More than a month  Symptoms include:  Itching  Symptom intensity:  Severe  Symptom progression:  Staying the same  Had these symptoms before:  No    She eats 2-3 servings of fruits and vegetables daily.She consumes 2 sweetened beverage(s) daily.She exercises with enough effort to increase her heart rate 10 to 19 minutes per day.  She exercises with enough effort to increase her heart rate 4 days per week.   She is taking medications regularly.       Review of Systems   Constitutional, HEENT, cardiovascular, pulmonary, gi and gu systems are negative, except as otherwise noted.      Objective    /78   Pulse 65   Temp 97.6  F (36.4  C) (Temporal)   Resp 16   Ht 1.633 m (5' 4.3\")   Wt 93.4 kg (206 lb)   LMP  (LMP Unknown)   SpO2 100%   BMI 35.03 kg/m    Body mass index is 35.03 kg/m .  Physical Exam   GENERAL: healthy, alert and no distress  EYES: Eyes grossly normal to inspection, PERRL and conjunctivae and sclerae normal  HENT: normal cephalic/atraumatic, right ear: occluded with wax, nose and mouth without ulcers or lesions, oropharynx clear and oral mucous membranes moist  NECK: no adenopathy, no asymmetry, masses, or scars and thyroid normal to palpation  RESP: lungs clear to auscultation - no rales, rhonchi or wheezes  CV: regular rate and rhythm, normal S1 S2, no S3 or S4, no murmur, click or rub, no peripheral " edema and peripheral pulses strong  ABDOMEN: soft, nontender, no hepatosplenomegaly, no masses and bowel sounds normal  MS: no gross musculoskeletal defects noted, no edema

## 2023-03-08 ENCOUNTER — OFFICE VISIT (OUTPATIENT)
Dept: OBGYN | Facility: CLINIC | Age: 33
End: 2023-03-08
Payer: COMMERCIAL

## 2023-03-08 VITALS
HEIGHT: 65 IN | DIASTOLIC BLOOD PRESSURE: 64 MMHG | SYSTOLIC BLOOD PRESSURE: 110 MMHG | BODY MASS INDEX: 48.82 KG/M2 | WEIGHT: 293 LBS

## 2023-03-08 DIAGNOSIS — N93.9 ABNORMAL UTERINE BLEEDING: Primary | ICD-10-CM

## 2023-03-08 DIAGNOSIS — K59.09 CHRONIC CONSTIPATION: ICD-10-CM

## 2023-03-08 DIAGNOSIS — Z01.812 PRE-PROCEDURE LAB EXAM: ICD-10-CM

## 2023-03-08 DIAGNOSIS — R93.89 THICKENED ENDOMETRIUM: ICD-10-CM

## 2023-03-08 LAB — HCG UR QL: NEGATIVE

## 2023-03-08 PROCEDURE — 58100 BIOPSY OF UTERUS LINING: CPT | Performed by: OBSTETRICS & GYNECOLOGY

## 2023-03-08 PROCEDURE — 88305 TISSUE EXAM BY PATHOLOGIST: CPT | Performed by: PATHOLOGY

## 2023-03-08 PROCEDURE — 81025 URINE PREGNANCY TEST: CPT | Performed by: OBSTETRICS & GYNECOLOGY

## 2023-03-08 RX ORDER — SENNA AND DOCUSATE SODIUM 50; 8.6 MG/1; MG/1
2 TABLET, FILM COATED ORAL 2 TIMES DAILY
Qty: 120 TABLET | Refills: 2 | Status: SHIPPED | OUTPATIENT
Start: 2023-03-08 | End: 2023-07-10

## 2023-03-08 NOTE — NURSING NOTE
"Chief Complaint   Patient presents with     Follow Up     Ultrasound        Initial /64 (BP Location: Right arm, Patient Position: Sitting, Cuff Size: Adult Large)   Ht 1.651 m (5' 5\")   Wt 143.3 kg (316 lb)   LMP 2023   BMI 52.59 kg/m   Estimated body mass index is 52.59 kg/m  as calculated from the following:    Height as of this encounter: 1.651 m (5' 5\").    Weight as of this encounter: 143.3 kg (316 lb).  BP completed using cuff size: large    Questioned patient about current smoking habits.  Pt. has never smoked.          The following HM Due: NONE    Zach Barker CMA                "

## 2023-03-08 NOTE — PROGRESS NOTES
PROCEDURE:  Endometrial Biopsy      Indications for procedure:  Evaluation of Abn uterine bleeding and Thickened endometrium    Pre-Procedure Medications:  None    The proposed procedure to diagnose the above condition was explained to the patient.  Risks, side effects, benefits, and alternatives were discussed. All questions were answered to patient's satisfaction. The patient gave informed consent.       The patient was placed in the dorsal lithotomy position and the perineum was exposed.  External genitalia appeared normal.  The uterus was mobile with normal size, shape, and consistency, without tenderness.  The adnexae palpated normally on bimanual exam without mass or tenderness. The uterus was anteverted  Appropriate sterile technique was used throughout the procedure.  A speculum was inserted and the cervix was visualized.  The cervix was cleansed with antiseptic solution.  A tenaculum was applied to the anterior lip of the cervix.  The uterus was sounded to 8 cm.  The [place] Endometrial Suction Curette was used and 1 rotating pass(s) were made between the fundus and the internal os. An adequate sample was obtained and sent to pathology.  Instruments were removed.  The patient experienced mild cramping during the procedure.  This subsided rapidly after instruments were removed.  The patient remained supine for a few moments after the procedure and had no other complications. No heavy bleeding was observed.       The patient was instructed to report any fever, cramping after 48 hours, or bleeding for 24-48 hours heavier than normal menses. OTC NSAIDs may be used for cramping PRN. Sexual relations may be resumed in 2-3 days, or after bleeding has stopped.   Pt. is to contact our office if she has not received the pathology report within 1-2 weeks of the procedure.    Assuming EMBx is benign, Pt to continue OCPs which she started 4 weeks ago w/ Dr. Liu.      Kerwin Mcmillan MD  [facility]  [facility]

## 2023-03-10 LAB
PATH REPORT.COMMENTS IMP SPEC: NORMAL
PATH REPORT.COMMENTS IMP SPEC: NORMAL
PATH REPORT.FINAL DX SPEC: NORMAL
PATH REPORT.GROSS SPEC: NORMAL
PATH REPORT.MICROSCOPIC SPEC OTHER STN: NORMAL
PATH REPORT.RELEVANT HX SPEC: NORMAL
PHOTO IMAGE: NORMAL

## 2023-03-21 NOTE — PROGRESS NOTES
"Siddharth is a 33 year old who is being evaluated via a billable video visit.      How would you like to obtain your AVS? MyChart  If the video visit is dropped, the invitation should be resent by: Text to cell phone: [phone]  Will anyone else be joining your video visit? No          Video-Visit Details    Type of service:  Video Visit   Video Start Time: 2:09pm  Video End Time: 3:02pm    Originating Location (pt. Location): Home    Distant Location (provider location):  Off-site  Platform used for Video Visit: [facility]      New Bariatric Nutrition Consultation Note    Reason For Visit: Nutrition Assessment    Siddharth Fontanez is a 33 year old presenting today for new bariatric nutrition consult.  Pt is interested in laparoscopic sleeve gastrectomy.  Patient is accompanied by self.    Support System Reviewed With Patient 3/19/2023   Who do you have in your support network that can be available to help you for the first 2 weeks after surgery? Yes   Who can you count on for support throughout your weight loss surgery journey? Yes       ANTHROPOMETRICS:   Estimated body mass index is 52.59 kg/m  as calculated from the following:    Height as of this encounter: 1.651 m (5' 5\").    Weight as of this encounter: 143.3 kg (316 lb).    Required weight loss goal pre-op: 10 lbs from initial consult weight (goal weight 306 lbs or less before surgery)       3/19/2023   I have tried the following methods to lose weight Watching portions or calories, Exercise, Atkins type diet (low carb/high protein), Pre packaged meals ex: Nutrisystem, Slimfast, OTC Medications, Prescription Medications       Weight Loss Questions Reviewed With Patient 3/19/2023   How long have you been overweight? Since early childhood       SUPPLEMENT INFORMATION:  Apple Cider Vinegar  Gummy Prenatal  Gummy Biotin    Bariatric vitamins/minerals prescribed by provider earlier today    NUTRITION HISTORY:  Recall Diet Questions Reviewed With Patient 3/19/2023   Describe " what you typically consume for breakfast (typical or most recent): Eggs, wheat bread, avocado, fruits, lemon tea , water   Describe what you typically consume for lunch (typical or most recent): Veggie pesto pasta, fish, chicken, veggies, water, smoothie, green shakes   Describe what you typically consume for supper (typical or most recent): Cereal, fruits, egg sandwich, water   Describe what you typically consume as snacks (typical or most recent): Fruits , veggie chips, water, smoothies, green shakes   How many ounces of water, or other low calorie drinks, do you drink daily (8 oz=1 glass)? 32 oz   How many ounces of caffeine (coffee, tea, pop) do you drink daily (8 oz=1 glass)? 8 oz   How many ounces of carbonated (pop, beer, sparkling water) drinks do you drinky daily (8 oz=1 glass)? 0 oz   How many ounces of juice, pop, sweet tea, sports drinks, protein drinks, other sweetened drinks, do you drink daily (8 oz=1 glass)? 0 oz   How many ounces of milk do you drink daily (8 oz=1 glass) 16 oz   Please indicate the type of milk: 2%   How often do you drink alcohol? Never       Eating Habits 3/19/2023   Do you have any dietary restrictions? No   Do you currently binge eat (eat a large amount of food in a short time)? No   Are you an emotional eater? No   Do you get up to eat after falling asleep? No   What foods do you crave? Savory corn, tacos, avacado toast       ADDITIONAL INFORMATION:  Pt has been considering surgery for awhile but wanted to exhaust non-surgical options first. Her sister has had successful surgery. Appropriate questions today about supplement usage and excess skin.     Dining Out History Reviewed With Patient 3/19/2023   How often do you dine out? Rarely.   Where do you dine out? (select all that apply) sit-down restaurants, take out   What types of food do you order when you dine out? Tuna melt, avacado toast, wings, tacos, sweet potato fries       Physical Activity Reviewed With Patient  "3/19/2023   How often do you exercise? 1 to 2 times per week   What is the duration of your exercise (in minutes)? 60+ Minutes   What types of exercise do you do? walking, gym membership, other   What keeps you from being more active?  I am as active as I can possbily be, Pain, I have recently been sick, My ability to walk or move around is limited, Shortness of breath, Too tired       NUTRITION DIAGNOSIS:  Obesity r/t long history of self-monitoring deficit and excessive energy intake aeb BMI >30 kg/m2.    INTERVENTION:  Intervention Provided/Education Provided on post-op diet guidelines, vitamins/minerals essential post-operatively, GI anatomy of bariatric surgeries, ways to help prepare for post-op diet guidelines pre-operatively, portion/calorie-control, mindful eating.  Provided pt with list of goals RD contact information.      Questions Reviewed With Patient 3/19/2023   How ready are you to make changes regarding your weight? Number 1 = Not ready at all to make changes up to 10 = very ready. 10   How confident are you that you can change? 1 = Not confident that you will be successful making changes up to 10 = very confident. 10       Patient Understanding: good  Expected Compliance: good    GOALS:  Begin taking multivitamin, calcium and vitamin D  Practice \"plate method\" for portion control  Be mindful of sweets/added sugars    Follow-Up:   Recommend 2-3 follow up visits to assist with lifestyle changes or per insurance.      Time spent with patient: 53 minutes    Nithya Walker RD, LD  Clinical Dietitian     "

## 2023-03-24 ENCOUNTER — VIRTUAL VISIT (OUTPATIENT)
Dept: SURGERY | Facility: CLINIC | Age: 33
End: 2023-03-24
Payer: COMMERCIAL

## 2023-03-24 ENCOUNTER — TELEPHONE (OUTPATIENT)
Dept: SURGERY | Facility: CLINIC | Age: 33
End: 2023-03-24

## 2023-03-24 VITALS — HEIGHT: 65 IN | BODY MASS INDEX: 48.82 KG/M2 | WEIGHT: 293 LBS

## 2023-03-24 VITALS — BODY MASS INDEX: 48.82 KG/M2 | HEIGHT: 65 IN | WEIGHT: 293 LBS

## 2023-03-24 DIAGNOSIS — M54.41 ACUTE BILATERAL LOW BACK PAIN WITH RIGHT-SIDED SCIATICA: ICD-10-CM

## 2023-03-24 DIAGNOSIS — N92.6 IRREGULAR PERIODS: ICD-10-CM

## 2023-03-24 DIAGNOSIS — Z13.0 SCREENING FOR ENDOCRINE, NUTRITIONAL, METABOLIC AND IMMUNITY DISORDER: ICD-10-CM

## 2023-03-24 DIAGNOSIS — Z13.29 SCREENING FOR ENDOCRINE, NUTRITIONAL, METABOLIC AND IMMUNITY DISORDER: ICD-10-CM

## 2023-03-24 DIAGNOSIS — E66.01 MORBID OBESITY (H): Primary | ICD-10-CM

## 2023-03-24 DIAGNOSIS — Z13.228 SCREENING FOR ENDOCRINE, NUTRITIONAL, METABOLIC AND IMMUNITY DISORDER: ICD-10-CM

## 2023-03-24 DIAGNOSIS — Z13.21 SCREENING FOR ENDOCRINE, NUTRITIONAL, METABOLIC AND IMMUNITY DISORDER: ICD-10-CM

## 2023-03-24 DIAGNOSIS — G43.009 MIGRAINE WITHOUT AURA AND WITHOUT STATUS MIGRAINOSUS, NOT INTRACTABLE: ICD-10-CM

## 2023-03-24 DIAGNOSIS — R73.09 ELEVATED GLUCOSE: ICD-10-CM

## 2023-03-24 PROBLEM — G43.909 MIGRAINE: Status: RESOLVED | Noted: 2022-08-10 | Resolved: 2023-03-24

## 2023-03-24 PROBLEM — Z78.9 KNOWN HEALTH PROBLEMS: NONE: Status: ACTIVE | Noted: 2023-02-10

## 2023-03-24 PROCEDURE — 97802 MEDICAL NUTRITION INDIV IN: CPT | Mod: VID

## 2023-03-24 PROCEDURE — 99215 OFFICE O/P EST HI 40 MIN: CPT | Mod: VID | Performed by: PHYSICIAN ASSISTANT

## 2023-03-24 RX ORDER — MENTHOL 5.8 MG/1
2 LOZENGE ORAL DAILY
Qty: 180 TABLET | Refills: 3 | Status: SHIPPED | OUTPATIENT
Start: 2023-03-24 | End: 2023-10-26

## 2023-03-24 RX ORDER — TOPIRAMATE 25 MG/1
TABLET, FILM COATED ORAL
Qty: 90 TABLET | Refills: 2 | Status: SHIPPED | OUTPATIENT
Start: 2023-03-24 | End: 2023-07-10

## 2023-03-24 RX ORDER — CALCIUM CARBONATE/VITAMIN D3 600 MG-10
1 TABLET ORAL 2 TIMES DAILY
Qty: 180 TABLET | Refills: 3 | Status: SHIPPED | OUTPATIENT
Start: 2023-03-24 | End: 2023-10-27

## 2023-03-24 NOTE — PROGRESS NOTES
"Siddharth is a 33 year old who is being evaluated via a billable video visit.      The patient has been notified of following:     \"This video visit will be conducted via a call between you and your physician/provider. We have found that certain health care needs can be provided without the need for an in-person physical exam.  This service lets us provide the care you need with a video conversation.  If a prescription is necessary we can send it directly to your pharmacy.  If lab work is needed we can place an order for that and you can then stop by our lab to have the test done at a later time.    Video visits are billed at different rates depending on your insurance coverage.  Please reach out to your insurance provider with any questions.    If during the course of the call the physician/provider feels a video visit is not appropriate, you will not be charged for this service.\"    Patient has given verbal consent for Video visit? Yes    How would you like to obtain your AVS? MyChart    If the video visit is dropped, the invitation should be resent by: Text to cell phone: [phone]    Will anyone else be joining your video visit? No    I    Video-Visit Details    Type of service:  Video Visit    Video Start Time: 11:07 AM    Video End Time: 12:00 PM    Originating Location (pt. Location): Home    Distant Location (provider location):  [facility] [place]     Platform used for Video Visit: [facility] Bariatric Surgery Consultation Note    2023    RE: Siddharth Fontanez  MR#: [id number]  : 1990      Referring provider: No flowsheet data found.    Chief Complaint/Reason for visit: evaluation for possible weight loss surgery    Dear [facility], [place] (General),    I had the pleasure of seeing your patient, Siddharth Fontanez, to evaluate her obesity and consider her for possible weight loss surgery. As you know, Siddharth Fontanez is 33 year old.  She has a height of 5' 5\"[Pt " reported[, a weight of 316 lbs 0 oz, and calculated Body mass index is 52.59 kg/m .     Assessment & Plan   Problem List Items Addressed This Visit     Morbid obesity (H) - Primary     3/24/2023  SWL Initial Wt 316 10# wt loss goal sleeve    Pt will start bariatric process.  Has 10 lb weight loss goal prior to surgery.  We discussed healthy habits to assist with weight loss. We discussed medication that may assist with weight loss including Phentermine, GLP-1 agonists and Topiramate.  Topiramate was prescribed. Pt denies kidney stones, kidney disease.  Is on oral contraceptive. Risks/ benefits and possible side effects were discussed and questions were answered. Written information was given.              Relevant Medications    topiramate (TOPAMAX) 25 MG tablet    calcium carbonate-vitamin D (CALCIUM 600 + D) 600-10 MG-MCG per tablet    cholecalciferol 125 MCG (5000 UT) CAPS    Multiple Vitamins-Iron (QC DAILY MULTIVITAMINS/IRON) TABS    Other Relevant Orders    Hemoglobin A1c    Vitamin D Deficiency    NUTRITION REFERRAL    Hepatic panel    Acute bilateral low back pain with right-sided sciatica    Relevant Medications    topiramate (TOPAMAX) 25 MG tablet    Migraine     Most common around menses.  Just started OCP so have been increased lately.  Will start Topiramate.          Relevant Medications    topiramate (TOPAMAX) 25 MG tablet    Irregular periods   Other Visit Diagnoses     Elevated glucose        Relevant Orders    Hemoglobin A1c    Screening for endocrine, nutritional, metabolic and immunity disorder        Relevant Orders    Vitamin D Deficiency           In summary, Siddharth Fontanez has Class III obesity with a body mass index of Body mass index is 52.59 kg/m . kg/m2 and the comorbidities stated above. She completed an informational seminar and is a possible candidate for bariatric surgery.   She will have to complete the following pre-requisites:    BARIATRIC TASK LIST  Lose 10 lbs prior to surgery.  Goal  Weight: 306 lbs    Have preoperative laboratory tests drawn.     Psychological Evaluation with MMPI and clearance for weight loss surgery.    Achieve clearance from dietitian to see surgeon.    Letter of support from therapist.    IN PERSON Visit for Bariatric Ed/Physical Exam w/ MKD     Once Siddharth has completed the requirements in the above tasklist and there are no further recommendations, she will see the surgeon for consultation for bariatric surgery. Siddharth  verbalizes understanding of the process to surgery and the post operative schedule.  All questions were answered.     Pt to follow up in 12 wks for a face to face visit with me. We will discuss the available weight loss surgeries including risks and benefits, get an official weight, review tasklist, and do a physical exam.     60 minutes spent on the date of the encounter doing chart review, history and exam, review test results, counseling, developing plan of care, documentation, and further activities as noted above.       HISTORY OF PRESENT ILLNESS:  Weight Loss History Reviewed with Patient 3/19/2023   How long have you been overweight? Since early childhood   What is the most that you have ever weighed? 330   What is the most weight you have lost? 30   I have tried the following methods to lose weight Watching portions or calories, Exercise, Atkins type diet (low carb/high protein), Pre packaged meals ex: Nutrisystem, Slimfast, OTC Medications, Prescription Medications   I have tried the following weight loss medications? (Check all that apply) Phentermine/Adipex-p/Suprenza   Have you ever had weight loss surgery? No     Was on phentermine.  It worked well initially.  Then when she restarted she had insomnia.      CO-MORBIDITIES OF OBESITY INCLUDE:     3/19/2023   I have the following health issues associated with obesity: None of the above       PAST MEDICAL HISTORY:  Past Medical History:   Diagnosis Date     Migraine 08/10/2022       PAST SURGICAL  HISTORY: No history of bleeding, clotting, malignant hyperthermia, or other anesthesia problems with surgery. Denies PONV.  Past Surgical History:   Procedure Laterality Date     TONSILLECTOMY  1995    approx       FAMILY HISTORY:   Family History   Problem Relation Age of Onset     Obesity Mother      Hypertension Mother      Diabetes Mother      Obesity Father      Obesity Sister      Gestational Diabetes Sister        SOCIAL HISTORY:   Social History Questions Reviewed With Patient 3/19/2023   Which best describes your employment status (select all that apply) I work full-time   If you work, what is your occupation? [employer] (Works from home/day shift)   Which best describes your marital status: Single, lives with a roommate.     Do you have children? No   Who do you have in your support network that can be available to help you for the first 2 weeks after surgery? Yes- roommates, Niece would stay with her or she could stay with sister.     Who can you count on for support throughout your weight loss surgery journey? Yes   Can you afford 3 meals a day?  Yes   Can you afford 50-60 dollars a month for vitamins? Yes       HABITS:     3/19/2023   How often do you drink alcohol? Never   Have you ever used any of the following nicotine products? Pipes   Have you or are you currently using street drugs or prescription strength medication for which you do not have a prescription for? No   Do you have a history of chemical dependency (alcohol or drug abuse)? No       PSYCHOLOGICAL HISTORY:   Psychological History Reviewed With Patient 3/19/2023   Have you ever attempted suicide? Never.   Have you had thoughts of suicide in the past year? No   Have you ever been hospitalized for mental illness or a suicide attempt? Never.   Do you have a history of chronic pain? No   Have you ever been diagnosed with fibromyalgia? No   Are you currently being treated for any of the following? (select all that apply) Depression,  Anxiety, Post traumatic stress disorder   Are you currently seeing a therapist or counselor?  Yes   Are you currently seeing a psychiatrist? No       ROS:     3/19/2023   Skin: None of the above   HEENT: Headaches, Dizziness/lightheadedness   Musculoskeletal: Joint Pain, Back pain   Cardiovascular: Shortness of breath with activity   Pulmonary: Shortness of breath with activity, Experience morning headaches   Gastrointestinal: Constipation.  Has BM every 3 days.  When on Senna has BM everyday.     Genitourinary: None of the above   Hematological: None of the above   Neurological: Migraine headaches   Female only: Irregular menstrual cycles       EATING BEHAVIORS:     3/19/2023   Have you or anyone else thought that you had an eating disorder? No   Do you currently binge eat (eat a large amount of food in a short time)? No   Are you an emotional eater? No   Do you get up to eat after falling asleep? No       EXERCISE:     3/19/2023   How often do you exercise? 1 to 2 times per week   What is the duration of your exercise (in minutes)? 60+ Minutes   What types of exercise do you do? walking, gym membership, other   What keeps you from being more active? I am as active as I can possbily be, Pain, I have recently been sick, My ability to walk or move around is limited, Shortness of breath, Too tired   Pt tore ligaments in her leg in the past so exercise is limited.       MEDICATIONS:  Current Outpatient Medications   Medication     calcium carbonate-vitamin D (CALCIUM 600 + D) 600-10 MG-MCG per tablet     cholecalciferol 125 MCG (5000 UT) CAPS     desogestrel-ethinyl estradiol (APRI) 0.15-30 MG-MCG tablet     hydrOXYzine (ATARAX) 25 MG tablet     Multiple Vitamins-Iron (QC DAILY MULTIVITAMINS/IRON) TABS     SENNA-docusate sodium (SENNA S) 8.6-50 MG tablet     topiramate (TOPAMAX) 25 MG tablet     No current facility-administered medications for this visit.     Supplements:  Apple cider vinegar  Biotin  Meltdown  Detox  Prenatal     ALLERGIES:  No Known Allergies      LABS AND RECORDS REVIEWED:  TSH   Date Value Ref Range Status   10/31/2018 2.44 0.40 - 4.00 mU/L Final     Sodium   Date Value Ref Range Status   01/27/2023 137 133 - 144 mmol/L Final   02/10/2020 140 133 - 144 mmol/L Final     Potassium   Date Value Ref Range Status   01/27/2023 3.9 3.4 - 5.3 mmol/L Final   02/10/2020 3.2 (L) 3.4 - 5.3 mmol/L Final     Chloride   Date Value Ref Range Status   01/27/2023 102 94 - 109 mmol/L Final   02/10/2020 107 94 - 109 mmol/L Final     Carbon Dioxide   Date Value Ref Range Status   02/10/2020 29 20 - 32 mmol/L Final     Carbon Dioxide (CO2)   Date Value Ref Range Status   01/27/2023 29 20 - 32 mmol/L Final     Anion Gap   Date Value Ref Range Status   01/27/2023 6 3 - 14 mmol/L Final   02/10/2020 4 3 - 14 mmol/L Final     Glucose   Date Value Ref Range Status   01/27/2023 108 (H) 70 - 99 mg/dL Final   02/10/2020 105 (H) 70 - 99 mg/dL Final     Urea Nitrogen   Date Value Ref Range Status   01/27/2023 9 7 - 30 mg/dL Final   02/10/2020 10 7 - 30 mg/dL Final     Creatinine   Date Value Ref Range Status   01/27/2023 0.80 0.52 - 1.04 mg/dL Final   02/10/2020 0.90 0.52 - 1.04 mg/dL Final     GFR Estimate   Date Value Ref Range Status   01/27/2023 >90 >60 mL/min/1.73m2 Final     Comment:     eGFR calculated using 2021 CKD-EPI equation.   02/10/2020 84 >60 mL/min/[1.73_m2] Final     Comment:     Non  GFR Calc  Starting 12/18/2018, serum creatinine based estimated GFR (eGFR) will be   calculated using the Chronic Kidney Disease Epidemiology Collaboration   (CKD-EPI) equation.       Calcium   Date Value Ref Range Status   01/27/2023 8.8 8.5 - 10.1 mg/dL Final   02/10/2020 8.1 (L) 8.5 - 10.1 mg/dL Final     Bilirubin Total   Date Value Ref Range Status   02/24/2019 0.3 0.2 - 1.3 mg/dL Final     Alkaline Phosphatase   Date Value Ref Range Status   02/24/2019 128 40 - 150 U/L Final     ALT   Date Value Ref Range Status  "  02/24/2019 18 0 - 50 U/L Final     AST   Date Value Ref Range Status   02/24/2019 16 0 - 45 U/L Final     Cholesterol   Date Value Ref Range Status   08/09/2018 140 <200 mg/dL Final     HDL Cholesterol   Date Value Ref Range Status   08/09/2018 45 (L) >49 mg/dL Final     LDL Cholesterol Calculated   Date Value Ref Range Status   08/09/2018 79 <100 mg/dL Final     Comment:     Desirable:       <100 mg/dl     Triglycerides   Date Value Ref Range Status   08/09/2018 78 <150 mg/dL Final     WBC   Date Value Ref Range Status   02/10/2020 13.4 (H) 4.0 - 11.0 10e9/L Final     WBC Count   Date Value Ref Range Status   01/27/2023 10.1 4.0 - 11.0 10e3/uL Final     Hemoglobin   Date Value Ref Range Status   01/27/2023 12.4 11.7 - 15.7 g/dL Final   02/10/2020 9.0 (L) 11.7 - 15.7 g/dL Final     Hematocrit   Date Value Ref Range Status   01/27/2023 39.9 35.0 - 47.0 % Final   02/10/2020 30.5 (L) 35.0 - 47.0 % Final     MCV   Date Value Ref Range Status   01/27/2023 86 78 - 100 fL Final   02/10/2020 81 78 - 100 fl Final     Platelet Count   Date Value Ref Range Status   01/27/2023 402 150 - 450 10e3/uL Final   02/10/2020 478 (H) 150 - 450 10e9/L Final         PHYSICAL EXAM:  Ht 5' 5\" (1.651 m)   Wt 316 lb (143.3 kg)   LMP 01/17/2023   BMI 52.59 kg/m    GENERAL: Healthy, alert and no distress  EYES: Eyes grossly normal to inspection.  No discharge or erythema, or obvious scleral/conjunctival abnormalities.  RESP: No audible wheeze, cough, or visible cyanosis.  No visible retractions or increased work of breathing.    SKIN: Visible skin clear. No significant rash, abnormal pigmentation or lesions.  NEURO: Cranial nerves grossly intact.  Mentation and speech appropriate for age.  PSYCH: Mentation appears normal, affect normal/bright, judgement and insight intact, normal speech and appearance well-groomed.      Sincerely,     Kisha Marrero PA-C        "

## 2023-03-24 NOTE — LETTER
[facility]           [address]  [address]  [place] [address]                                         Tel:  [phone]  Fax: [phone]    March 24, 2023    Siddharth Fontanez  [address]  [address]  [place] [id number]   1990      Bariatric Letter of Support from Mental Health Provider    Dear Mental Health Provider:    Siddharth is seeking bariatric surgery.  Although she will undergo a separate bariatric psychological evaluation, it is important to us that mental health providers of our surgical patients are involved at all stages of the process.     Please write a letter of support including the following information:    1. Is the patient under your care?  If so, for how long?  2. Comment on the relevant diagnosis and any current related prescribed medications.  3. From a mental health standpoint do you feel patient is stable?  4. It is important that the patient have regular follow up initially and for the first year following surgery.  Are you able to see the patient for this?    Sincerely,      Kisha Marrero PA-C  [facility]   Please fax letter to [phone] or route in Epic to Kisha Marrero PA-C

## 2023-03-24 NOTE — TELEPHONE ENCOUNTER
Pt called and said her Hepatic Panel had . We looked and it was set to  . Pt did not have any other questions or concerns.    Sarai Mixon RN on 3/24/2023 at 3:16 PM

## 2023-03-24 NOTE — PATIENT INSTRUCTIONS
Nice to meet you today.  Below is our plan we discussed.-  GUERO Beard  Start Topiramate (Remember to drink plenty of fluids daily)   Week 1:Take 1 tablet (25 mg) at bedtime  Week 2 Take 2 tablets (50 mg) at bedtime  Week 3:Take 3 tablets (75 mg) at bedtime Stay at 3 tabs until you are seen again.    Bariatric Task List  Lose 10 lbs prior to surgery.  Goal Weight: 306 lbs  Have preoperative laboratory tests drawn.   Psychological Evaluation with MMPI and clearance for weight loss surgery.  Achieve clearance from dietitian to see surgeon.  Letter of support from therapist.  IN PERSON Visit for Bariatric Ed/Physical Exam w/ MKD   Labs have been ordered. Call [phone] for an appt.    Psychological evaluation was ordered.  Call [phone] to schedule.   FOLLOW-UP:  Call [phone]  -3 mo pre-surgery visit IN CLINIC with Kisha Marrero PA-C  -1 mo nutrition visit  _________________________________________________  In general before being submitted for insurance approval, you will need the following:  -Clearance by the Psychologist  -Clearance by the dietitian  -Surgeon consult  -Use CPAP for at least one month before surgery if you have sleep apnea. Bring to hospital day of surgery.  -Tobacco free for 3 months before surgery and remain a non-smoker thereafter.   -If you have diabetes, A1C less than 8 before surgery  ____________________________________________________________________  After Bariatric Surgery:   Vitamin supplementation is a lifelong need. You will take:  B-12 daily or by injection monthly  Vitamin D3 5000 IU daily   2 Multivitamins containing 18mg of iron  Calcium citrate 2 daily  Thiamine 100 mg weekly   Vitron C once daily if you are a menstruating female  Follow up is impotent to keep your weight off and your vitamin levels up.  Your labs will be monitored every 3 months for the first year and yearly thereafter.  Prevent Ulcers by avoiding tobacco and anti-inflammatories (NSAIDS) forever.   Also alcohol and caffeine in excess. NSAIDS examples: Aspirin, Ibuprofen, Naproxen) Tylenol is fine.    Protect you stomach if on Asprin.  If on Aspirin to protect your heart or for another reason, make sure to take a PPI like (omeprazole, protonix, nexium, prevacid) to protect your stomach.    Alcohol affects you differently. There is a 10% increase of Alcohol Use Disorder in patients with bariatric surgery.   If you have even ONE drink DO NOT DRIVE.    TOPIRAMATE (TOPAMAX)    Topiramate (Topamax) is a medication that is used most often to treat migraine headaches or for seizures. It has also been found to help with weight loss. Although it's not currently FDA approved for weight loss, it has been used safely for a number of years to help people who are carrying extra weight.     Just how topiramate helps with weight loss has not been exactly determined. However it seems to work on areas of the brain to quiet down signals related to eating.      Topiramate may make you:    >feel less interest in eating in between meals   >think less about food and eating   >find it easier to push the plate away   >find giving up pop easier    >have an easier time eating less    Instructions:  Week 1:Take 1 tablet (25 mg) at bedtime  Week 2 Take 2 tablets (50 mg) at bedtime  Week 3:Take 3 tablets (75 mg) at bedtime Stay at 3 tabs until you are seen again.     For some of our patients, the pills work right away. They feel and think quite differently about food. Other patients don't feel much of a change but find in fact they have lost weight! Like all weight loss medications, topiramate works best when you help it work.  This means:   1) Have less tempting high calorie (fattening) food around the house or office    2) Have lower calorie food (fruits, vegetables,low fat meats and dairy) for snacks    3) Eat out only one time or less each week.   4) Eat your meals at a table with the TV or computer off.    Side-effects Topiramate is  generally well tolerated. The main side-effects we see are:   Tingling in hands,feet, or face (usually not very troublesome)   Mental confusion and word finding trouble (about 10% of patients have this.)     Feeling sleepy or a bit dopey- this goes away very soon after starting.    One of the dangers of topiramate is the possibility of birth defects--if you get pregnant when you are on it, there is the risk that your baby will be born with a cleft lip or palate.  If you are on topiramate and of child bearing age, you need to be on a reliable form of birth control or refrain from sexual intercourse.     For any questions or concerns please send a liveBooks message to our team or call our weight management call center at [phone] during regular business hours. For questions during evenings or weekends your messages will be addressed during the next business day.  For emergencies please call 911 or seek immediate medical care.

## 2023-03-24 NOTE — LETTER
Siddharth Fontanez  March 24, 2023        Bariatric Task List      Required Weight loss:    Lose 10 lbs prior to surgery.  Goal Weight: 306 lbs  Tasks:  Have preoperative laboratory tests drawn.     Psychological Evaluation with MMPI and clearance for weight loss surgery.    Achieve clearance from dietitian to see surgeon.    Letter of support from therapist.    IN PERSON Visit for Bariatric Ed/Physical Exam w/ MKD

## 2023-03-24 NOTE — ASSESSMENT & PLAN NOTE
Most common around menses.  Just started OCP so have been increased lately.  Will start Topiramate.

## 2023-03-24 NOTE — PATIENT INSTRUCTIONS
"Macario Messina!     It was great chatting with you today! Here are some links to the information we discussed:      Vitamins and Minerals  [email or web address]     Diet Guidelines:  [email or web address]     Your Stage 1 Diet: Clear Liquids  [email or web address]     Your Stage 2 Diet: Low-fat Full Liquids  [email or web address]     Your Stage 3 Diet: Pureed Foods  [email or web address]     Your Stage 4 Diet: Soft Foods  [email or web address]    Your Stage 5 Diet: Regular Foods  [email or web address]     Here are the goals we discussed for this first month:     1. Begin taking a daily multivitamin , calcium, and vitamin D supplement   - The doses/requirements for these are in the vitamin/mineral handout link above.   - The easiest schedule is to take your Calcium with meals, and take everything else at bedtime     2. Practice \"plate method\" for portion control   My Plate:  https://www.choosemyplate.gov/      3. Be mindful of sweets/added sugars  - Choose foods with less than 10g of sugar per serving              Your next visit can be scheduled via the call center at  and should be in one month.  Have a great week and let us know if any questions/concerns pop up!        Nithya Walker RD, LD?  Clinical Dietitian       "

## 2023-03-24 NOTE — ASSESSMENT & PLAN NOTE
3/24/2023  SWL Initial Wt 316 10# wt loss goal sleeve    Pt will start bariatric process.  Has 10 lb weight loss goal prior to surgery.  We discussed healthy habits to assist with weight loss. We discussed medication that may assist with weight loss including Phentermine, GLP-1 agonists and Topiramate.  Topiramate was prescribed. Pt denies kidney stones, kidney disease.  Is on oral contraceptive. Risks/ benefits and possible side effects were discussed and questions were answered. Written information was given.

## 2023-03-31 ENCOUNTER — TRANSFERRED RECORDS (OUTPATIENT)
Dept: HEALTH INFORMATION MANAGEMENT | Facility: CLINIC | Age: 33
End: 2023-03-31

## 2023-04-02 ENCOUNTER — LAB (OUTPATIENT)
Dept: LAB | Facility: CLINIC | Age: 33
End: 2023-04-02
Payer: COMMERCIAL

## 2023-04-02 DIAGNOSIS — E66.01 MORBID OBESITY (H): ICD-10-CM

## 2023-04-02 DIAGNOSIS — Z13.29 SCREENING FOR ENDOCRINE, NUTRITIONAL, METABOLIC AND IMMUNITY DISORDER: ICD-10-CM

## 2023-04-02 DIAGNOSIS — R73.09 ELEVATED GLUCOSE: ICD-10-CM

## 2023-04-02 DIAGNOSIS — Z13.21 SCREENING FOR ENDOCRINE, NUTRITIONAL, METABOLIC AND IMMUNITY DISORDER: ICD-10-CM

## 2023-04-02 DIAGNOSIS — Z13.0 SCREENING FOR ENDOCRINE, NUTRITIONAL, METABOLIC AND IMMUNITY DISORDER: ICD-10-CM

## 2023-04-02 DIAGNOSIS — Z13.228 SCREENING FOR ENDOCRINE, NUTRITIONAL, METABOLIC AND IMMUNITY DISORDER: ICD-10-CM

## 2023-04-02 LAB — HBA1C MFR BLD: 5.8 % (ref 0–5.6)

## 2023-04-02 PROCEDURE — 80076 HEPATIC FUNCTION PANEL: CPT

## 2023-04-02 PROCEDURE — 83036 HEMOGLOBIN GLYCOSYLATED A1C: CPT

## 2023-04-02 PROCEDURE — 36415 COLL VENOUS BLD VENIPUNCTURE: CPT

## 2023-04-02 PROCEDURE — 82306 VITAMIN D 25 HYDROXY: CPT

## 2023-04-03 LAB
ALBUMIN SERPL BCG-MCNC: 4.1 G/DL (ref 3.5–5.2)
ALP SERPL-CCNC: 121 U/L (ref 35–104)
ALT SERPL W P-5'-P-CCNC: 115 U/L (ref 10–35)
AST SERPL W P-5'-P-CCNC: 72 U/L (ref 10–35)
BILIRUB DIRECT SERPL-MCNC: <0.2 MG/DL (ref 0–0.3)
BILIRUB SERPL-MCNC: 0.3 MG/DL
DEPRECATED CALCIDIOL+CALCIFEROL SERPL-MC: 20 UG/L (ref 20–75)
PROT SERPL-MCNC: 8.6 G/DL (ref 6.4–8.3)

## 2023-04-13 ENCOUNTER — TELEPHONE (OUTPATIENT)
Dept: SURGERY | Facility: CLINIC | Age: 33
End: 2023-04-13
Payer: COMMERCIAL

## 2023-04-26 ENCOUNTER — VIRTUAL VISIT (OUTPATIENT)
Dept: SURGERY | Facility: CLINIC | Age: 33
End: 2023-04-26
Payer: COMMERCIAL

## 2023-04-26 DIAGNOSIS — E66.01 MORBID OBESITY (H): Primary | ICD-10-CM

## 2023-04-26 PROCEDURE — 97803 MED NUTRITION INDIV SUBSEQ: CPT | Mod: VID

## 2023-04-26 NOTE — PROGRESS NOTES
"Siddharth is a 33 year old who is being evaluated via a billable video visit.      How would you like to obtain your AVS? MyChart  If the video visit is dropped, the invitation should be resent by: Text to cell phone: [phone]  Will anyone else be joining your video visit? No        Video-Visit Details    Type of service:  Video Visit   Video Start Time: 3:00pm  Video End Time:3:29pm    Originating Location (pt. Location): Home    Distant Location (provider location):  Off-site  Platform used for Video Visit: [employer]    PRE SURGICAL WEIGHT LOSS NUTRITION APPOINTMENT    Siddharth Fontanez  1990  female  [id number]  33 year old    ASSESSMENT    Desired Surgical Procedure: gastric sleeve    REASON FOR VISIT:  Siddharth Fontanez is a 33 year old year old female presents today for a pre-surgical weight loss follow-up appointment. Patient accompanied by self.    DIAGNOSIS:  Weight Status Obesity Grade III BMI >40    ANTHROPOMETRICS:  Height: 65\"   Initial Weight: 316 lbs      Current weight: n/a    BMI: n/a    VITAMINS AND MINERALS:  1 Multivitamin with Minerals (7pm)  600 mg Calcium with Vitamin D (7pm)  5000 International units Vitamin D (7pm  1 Iron (7pm)      NUTRITION HISTORY:  Breakfast: [wakes at 8-9am; eats within 1h or sometimes later (2x/week)] cereal (frosted flakes, cheerios, honey oats +2% milk)  oatmeal  eggs  smoothie  avocado toast  Lunch: [1-3pm] pasta  rice  sandwich  chicken or pasta salad  fried rice  leftovers  Supper: [8-9pm] oatmeal  cereal  fruit  almond milk   Snacks: fruit, sweets (occasional)  Fluids Consumed: water (48oz), tea (herbal), juice (rare; 1/2c diluted)  Eating slower: Yes  Chewing foods thoroughly: No  Take 20-30 minutes to consume each meal: No (15-20)  Fluids and meals separate by at least 30 minutes: No  Carbonation: none  Caffeine: none  Additional Information: Pt successful in being more mindful of her intake of sweets. Reviewed necessary behavior changes for surgery. Reviewed " "vitamin/mineral timing.     PHYSICAL ACTIVITY:  Type: walking, yoga  Frequency: 4-5 (days per week)  Duration: 30(minutes)     DIAGNOSIS:  Previous Nutrition Diagnosis: Obesity related to long history of self- monitoring deficit and excessive energy intake evidenced by BMI of 52.59 kg/m2  No change, modified below    Previous goals:   Begin taking multivitamin, calcium and vitamin D - met  Practice \"plate method\" for portion control - not met  Be mindful of sweets/added sugars - improving    Current Nutrition Diagnosis: Obesity related to long history of self-monitoring deficit and excessive energy intake as evidenced by previous BMI of >40 kg/m2.    INTERVENTION:  Nutrition Prescription: Recommended energy/nutrient modification.    GOALS:  Take all pre-op vitamins per guidelines  Practice plate method for portion control  Separate fluids and meals by 30 minutes  Eat slowly and chew well    Intervention:  - Discussed progress towards previous goals.  - Reinforced importance of making behavior changes in preparation for bariatric surgery.   - Assessed learning needs and learning preferences       NUTRITION MONITORING AND EVALUATION:  Anticipated compliance: fair-good  Patient demonstrated fair-good understanding.       Follow up: Continue to monitor patient closely regarding weight loss and diet.  # of visits needed: 1-2  Cleared by RD: No     TIME SPENT WITH PATIENT: 29 minutes      Nithya Walker RD, LD  Clinical Dietitian     "

## 2023-04-26 NOTE — PATIENT INSTRUCTIONS
"Macario Messina!      It was great chatting with you again today! Here's a summary of the goals we discussed:    1. Take all vitamins/minerals per guidelines  - Continue to take your multivitamin, iron and vitamin D in the evenings  - Take your Calcium right before breakfast and lunch    2. Separate fluids and meals by 30 minutes  - Avoid drinking/sipping for 30 minutes before, during and after your meals  - Try increasing your water intake in between meals to help with hydration status    3. Eat slowly and chew well  - Take 20-30 minutes to eat each meal  - Chew all food into applesauce consistency    4. Practice \"plate method\" at all meals  - Make 1/2 your plate vegetables and fruit  - Limit starchy foods (rice, pasta, bread, etc) to 1/4 of your plate  - Make protein 1/4 of your plate    Protein Sources:  [email or web address]      Plan on following up in 1 month. This can be scheduled via our call center at . Reach out sooner with any questions or concerns. Have a great day!      Nithya Walker, LORENZO, LD?  Clinical Dietitian       "

## 2023-05-01 ENCOUNTER — TELEPHONE (OUTPATIENT)
Dept: SURGERY | Facility: CLINIC | Age: 33
End: 2023-05-01
Payer: COMMERCIAL

## 2023-05-05 NOTE — TELEPHONE ENCOUNTER
PA Initiation    Medication: D-3-5 125 MCG(5000 UT) Capsules  Insurance Company: [employer] - Phone [phone] Fax [phone]  Pharmacy Filling the Rx: [id number] - [place], [place] - [address] [place] [address]  Filling Pharmacy Phone: [phone]  Filling Pharmacy Fax: [phone]  Start Date: 5/5/2023

## 2023-05-09 ENCOUNTER — VIRTUAL VISIT (OUTPATIENT)
Dept: GASTROENTEROLOGY | Facility: CLINIC | Age: 33
End: 2023-05-09
Payer: COMMERCIAL

## 2023-05-09 DIAGNOSIS — F41.9 ANXIETY: ICD-10-CM

## 2023-05-09 DIAGNOSIS — F32.A DEPRESSION, UNSPECIFIED DEPRESSION TYPE: Primary | ICD-10-CM

## 2023-05-09 DIAGNOSIS — E66.01 MORBID OBESITY (H): ICD-10-CM

## 2023-05-09 DIAGNOSIS — F54 PSYCHOLOGICAL FACTORS AFFECTING MEDICAL CONDITION: ICD-10-CM

## 2023-05-09 PROCEDURE — 90791 PSYCH DIAGNOSTIC EVALUATION: CPT | Mod: VID | Performed by: PSYCHOLOGIST

## 2023-05-09 NOTE — LETTER
5/9/2023         RE: Siddharth Fontanez  [address]  [address]  [facility] [address]        Dear Colleague,    Thank you for referring your patient, Siddharth Fontanez, to the [facility] [place]. Please see a copy of my visit note below.    This telehealth service is appropriate and effective for delivering services in light of the necessity for social distancing to mitigate the COVID-19 epidemic and for conservation of PPE.     Patient has agreed to receiving telehealth services after being informed about it: Yes    Patient prefers video invitation/information to be sent by:   email    Time service started: 1:10 PM  Time service ended: 1:50 PM    Mode of transmission: FreeDrive    Location of originating:  Home of the patient    Distance site:  Home office of provider for [facility]    The patient has been notified that:  Video visits will be conducted via a call with their psychologist to provide the care they need with a video conversation. Video visits may be billed at different rates depending on insurance coverage.  Patients are advised to please contact their insurance provider with any questions about their health insurance coverage. If during the course of a call the psychologist feels a video visit is not appropriate, patients will not be charged for this service.        Confidential Summary of Standard Psychodiagnostic Evaluation*    Referral Source:  Kisha Marrero PA-C, [facility] [place]    Reason for Referral:  Complete preoperative bariatric surgery psychological evaluation to determine if psychological factors might interfere with the patient's ability to comply with presurgical procedures and rigorous postsurgical behavioral guidelines.     Informed Consent:  Informed consent included a review of the nature and purpose of the assessment as well as confidentiality and limits thereof. Discussed role of health psychologist in multidisciplinary care  team and documentation of visit in EMR.  The patient expressed understanding, provided verbal consent, and agreed to proceed.    Sources of Information:  Information was obtained from a clinical interview with the patient, review of available medical records, and administration of psychological assessments.     History of Presenting Concerns:  Siddharth Fontanez is a 33 year oldfemale with obesity considering bariatric surgery for surgical weight management. Ms. Fontanez presented to the weight loss surgery program with an initial consult weight of 316 pounds on 3/24/23.  Required weight loss goal prior to surgery is 10 pounds from the initial consultation weight or 306 pounds.  She is interested in weight loss surgery to obtain a more durable weight loss intervention to assist with weight loss long-term.  Desire for weight loss surgery is to improve health and quality of life as well as prevent weight related medical complications.  She has a sister who has had bariatric surgery and this serves as a motivating factor to her prior to this intervention as well.  Prior weight loss attempts have included portion control, exercise, low carbohydrate diets, prepackaged meal programs and medications such as phentermine.  Phentermine was initially effective yet was associated with insomnia so this medicine was stopped.  She was recently prescribed topiramate from the weight management clinic which she finds to help with diet change.  She described obesity starting in early childhood and her highest lifetime weight was reported to be 330 pounds.  She lost 30 pounds using weight loss medications which has been the most weight lost in 1 effort to date.  She endorsed good understanding of the surgical procedure, risks and potential complications.  She endorsed good understanding of the required diet and lifestyle changes including increased protein intake, slowing food consumption down, chewing well, and taking a multivitamin long-term.   She has already begun to make these changes in conjunction with a dietitian.  Currently she is increasing exercise as possible by walking.  She endorses limitations due to a history of a previous ligament injury.      Medical History:    Past Medical History:   Diagnosis Date    Migraine 08/10/2022       Past Surgical History:   Procedure Laterality Date    TONSILLECTOMY  1995    approx       Current Outpatient Medications   Medication    calcium carbonate-vitamin D (CALCIUM 600 + D) 600-10 MG-MCG per tablet    cholecalciferol 125 MCG (5000 UT) CAPS    desogestrel-ethinyl estradiol (APRI) 0.15-30 MG-MCG tablet    hydrOXYzine (ATARAX) 25 MG tablet    Multiple Vitamins-Iron (QC DAILY MULTIVITAMINS/IRON) TABS    SENNA-docusate sodium (SENNA S) 8.6-50 MG tablet    topiramate (TOPAMAX) 25 MG tablet     No current facility-administered medications for this visit.       Patient Care Team:  [facility], [place] as PCP - General (Family Practice)  Eunice Freitas MD as Assigned PCP  Jose Schofield PA-C as Assigned Musculoskeletal Provider  Kerwin Mcmillan MD as Assigned OBGYN Provider  Kisha Marrero PA-C as Assigned Surgical Provider       Psychiatric History:  She endorsed a history of depression, anxiety and PTSD.  She is currently engaged in psychotherapy and has received a letter of support from her current therapist Ari Wright.  This has been received by the clinic. She currently endorses her mental health to be stable and is supported by a psychotherapist with whom she plans to continue seeing after surgery as well.  There is no reported suicidal ideation, plan or intent or previous attempt.  She endorses no current emotional eating, binge eating restrictive eating or other disordered eating thoughts or behaviors.    Substance Use History:  There is no self-reported history of substance abuse by the patient in the interview today or in the medical record.    Social History:  She currently  lives with a roommate and states that she enjoys her living situation.  She works full-time in customer service.  Support after surgery would be offered by her family and roommates.  She endorses no significant stressors in her life at this time.    Psychological Assessment:  The patient completed the following battery of assessments during this psychological evaluation: World Health Organization Disability Assessment Schedule 2.0 12-item (WHODAS), Patient Health Questionnaire-9 (PHQ-9), Generalized Anxiety Disorder-7 screener (TRINITY-7), CAGE Questionnaire Adapted to Include Drugs (CAGE-AID), and the [place] Multiphasic Personality Xzzglwrkl-7-Bxsleaiipjnb Form (MMPI-2-RF).     The WHODAS measures disability and functional impairment due to health conditions including diseases, illnesses, injuries, mental or emotional problems, and problems with alcohol or drugs. The possible range of scores is 12-60 and higher scores indicate higher levels of disability.           5/11/2023    11:21 AM   WHODAS 2.0 Total Score   Total Score 12   Total Score MyChart 12         The PHQ-9 is an instrument for screening, diagnosing, monitoring and measuring the severity of depression. Scores of 5, 10, 15, and 20 represent cutpoints for mild, moderate, moderately severe and severe depressive symptoms, respectively.           5/11/2023    11:18 AM   PHQ   PHQ-9 Total Score 1   Q9: Thoughts of better off dead/self-harm past 2 weeks Not at all       The TRINITY-7 is an instrument for screening, diagnosing, monitoring and measuring the severity of anxiety. Scores of 5, 10, and 15 represent cutpoints for mild, moderate, and severe anxiety symptoms, respectively.        8/10/2012     2:17 PM 5/11/2023    11:18 AM   TRINITY-7 SCORE   Total Score 12    Total Score  0 (minimal anxiety)   Total Score  0         The CAGE-AID questionnaire is used to screen for alcohol or drug abuse and dependence in adults. A CAGE-AID score  > 1 is a positive screen,  suggesting further discussion is needed to determine if evaluation for alcohol or substance abuse is appropriate. A score > 2 is considered clinically significant, suggesting further evaluation of alcohol or substance-related problems is indicated.          5/11/2023    11:21 AM   CAGE-AID Total Score   Total Score 0   Total Score MyChart 0 (A total score of 2 or greater is considered clinically significant)       CAGE-AID score  > 1 is a positive screen, suggesting further discussion is needed to determine if evaluation for alcohol or substance abuse is appropriate.  A score > 2 is considered clinically significant, suggesting further evaluation of alcohol or substance-related problems is indicated.    The MMPI-2-RF is a 338-item self-report instrument designed to be a broadband assessment of psychological functioning.     This assessment was administered remotely and will be incorporated into the results once it is received.    Mental Status Examination:  Appearance/Behavior/Orientation: Patient was on time, appropriately groomed and dressed, and demonstrated good eye contact. Alert and oriented to person, place, time, and situation. No evidence of psychomotor agitation.     Cooperation/Reliability: Patient was open and cooperative.   Speech/Language: Speech was clear, coherent, and of normal rate, rhythm and volume.    Thought Form: Overall logical and organized.   Thought Content: Appropriate to interview and situation  Cognition/Memory: Not formally assessed, but no difficulties apparent upon interview.   Attention/Concentration: Good throughout interview.    Fund of knowledge: Consistent with age and level of education.    Abstract reasoning: Not assessed.   Judgment: Intact.    Mood/Affect: Mood euthymic; appropriate range of affect.    Insight/Motivation: Good insight into influence of emotions and behavior on weight. Motivation for bariatric surgery appears high   Suicide/Assault: Patient denies suicidal or  assaultive ideation, plan, or intent    Impression:  Siddharth Fontanez is a 33 year old female with obesity considering laparoscopic sleeve gastrectomy.  She presents with good knowledge about the surgical procedure, risks and endorses realistic expectations for outcomes.  She presents with a history of depression, anxiety and trauma with mental health symptoms currently stable and is currently engaged in psychotherapy.  She plans to continue seeing this provider postoperatively, which will serve as an added resource for postoperative adjustment issues should they arise.  Her mental health provider currently supports her decision to pursue surgery.  She endorses no concerns related to substance abuse or suicidal ideation and behavior.  Her social support appears good and she has a stable home environment and support network.  No contraindications indicated during this assessment    Diagnosis:  Depressive disorder, unspecified  Anxiety disorder, unspecified  Psychological factors affecting medical condition (F54)  Obesity    Recommendation/Plan:  Siddharth Fontanez presents as a reasonably good candidate for bariatric surgery at this time and is recommended for weight loss surgery.  I recommend that she continue engaging in her mental health care as scheduled for ongoing maintenance of depression and anxiety.    Connie Regalado, PhD,   Clinical Health Psychologist    *In accordance with the Rules of the [place] Board of Psychology, it is noted that psychological descriptions and scientific procedures underlying psychological evaluations have limitations.  Absolute predictions cannot be made based on information in this report.      *no letter    This note was completed using Dragon voice recognition software.  Although reviewed after completion, some word and grammatical errors may occur.            Again, thank you for allowing me to participate in the care of your patient.      Sincerely,    Connie Regalado, PhD

## 2023-05-09 NOTE — TELEPHONE ENCOUNTER
Prior Authorization Not Needed per Insurance    Medication: D-3-5 125 MCG(5000 UT) Capsules  Insurance Company: [employer] - Phone [phone] Fax [phone]  Expected CoPay:      Pharmacy Filling the Rx: [id number] - [place], [place] - [address]  Pharmacy Notified: Yes  Patient Notified: Yes    Pharmacy received paid claim, no further PA needed.

## 2023-05-09 NOTE — PROGRESS NOTES
This telehealth service is appropriate and effective for delivering services in light of the necessity for social distancing to mitigate the COVID-19 epidemic and for conservation of PPE.     Patient has agreed to receiving telehealth services after being informed about it: Yes    Patient prefers video invitation/information to be sent by:   email    Time service started: 1:10 PM  Time service ended: 1:50 PM    Mode of transmission: SMRxT    Location of originating:  Home of the patient    Distance site:  Home office of provider for [facility]    The patient has been notified that:  Video visits will be conducted via a call with their psychologist to provide the care they need with a video conversation. Video visits may be billed at different rates depending on insurance coverage.  Patients are advised to please contact their insurance provider with any questions about their health insurance coverage. If during the course of a call the psychologist feels a video visit is not appropriate, patients will not be charged for this service.        Confidential Summary of Standard Psychodiagnostic Evaluation*    [facility] Source:  [facility] [place]    Reason for Referral:  Complete preoperative bariatric surgery psychological evaluation to determine if psychological factors might interfere with the patient's ability to comply with presurgical procedures and rigorous postsurgical behavioral guidelines.     Informed Consent:  Informed consent included a review of the nature and purpose of the assessment as well as confidentiality and limits thereof. Discussed role of health psychologist in multidisciplinary care team and documentation of visit in EMR.  The patient expressed understanding, provided verbal consent, and agreed to proceed.    Sources of Information:  Information was obtained from a clinical interview with the patient, review of available medical records, and  administration of psychological assessments.     History of Presenting Concerns:  Siddharth Fontanez is a 33 year oldfemale with obesity considering bariatric surgery for surgical weight management. Ms. Fontanez presented to the weight loss surgery program with an initial consult weight of 316 pounds on 3/24/23.  Required weight loss goal prior to surgery is 10 pounds from the initial consultation weight or 306 pounds.  She is interested in weight loss surgery to obtain a more durable weight loss intervention to assist with weight loss long-term.  Desire for weight loss surgery is to improve health and quality of life as well as prevent weight related medical complications.  She has a sister who has had bariatric surgery and this serves as a motivating factor to her prior to this intervention as well.  Prior weight loss attempts have included portion control, exercise, low carbohydrate diets, prepackaged meal programs and medications such as phentermine.  Phentermine was initially effective yet was associated with insomnia so this medicine was stopped.  She was recently prescribed topiramate from the weight management clinic which she finds to help with diet change.  She described obesity starting in early childhood and her highest lifetime weight was reported to be 330 pounds.  She lost 30 pounds using weight loss medications which has been the most weight lost in 1 effort to date.  She endorsed good understanding of the surgical procedure, risks and potential complications.  She endorsed good understanding of the required diet and lifestyle changes including increased protein intake, slowing food consumption down, chewing well, and taking a multivitamin long-term.  She has already begun to make these changes in conjunction with a dietitian.  Currently she is increasing exercise as possible by walking.  She endorses limitations due to a history of a previous ligament injury.      Medical History:    Past Medical History:    Diagnosis Date     Migraine 08/10/2022       Past Surgical History:   Procedure Laterality Date     TONSILLECTOMY  1995    approx       Current Outpatient Medications   Medication     calcium carbonate-vitamin D (CALCIUM 600 + D) 600-10 MG-MCG per tablet     cholecalciferol 125 MCG (5000 UT) CAPS     desogestrel-ethinyl estradiol (APRI) 0.15-30 MG-MCG tablet     hydrOXYzine (ATARAX) 25 MG tablet     Multiple Vitamins-Iron (QC DAILY MULTIVITAMINS/IRON) TABS     SENNA-docusate sodium (SENNA S) 8.6-50 MG tablet     topiramate (TOPAMAX) 25 MG tablet     No current facility-administered medications for this visit.       Patient Care Team:  [facility], [place] as PCP - General (Family Practice)  Eunice Freitas MD as Assigned PCP  Jose Schofield PA-C as Assigned Musculoskeletal Provider  Kerwin Mcmillan MD as Assigned OBGYN Provider  Kisha Marrero PA-C as Assigned Surgical Provider       Psychiatric History:  She endorsed a history of depression, anxiety and PTSD.  She is currently engaged in psychotherapy and has received a letter of support from her current therapist Ari Wright.  This has been received by the clinic. She currently endorses her mental health to be stable and is supported by a psychotherapist with whom she plans to continue seeing after surgery as well.  There is no reported suicidal ideation, plan or intent or previous attempt.  She endorses no current emotional eating, binge eating restrictive eating or other disordered eating thoughts or behaviors.    Substance Use History:  There is no self-reported history of substance abuse by the patient in the interview today or in the medical record.    Social History:  She currently lives with a roommate and states that she enjoys her living situation.  She works full-time in customer service.  Support after surgery would be offered by her family and roommates.  She endorses no significant stressors in her life at this  time.    Psychological Assessment:  The patient completed the following battery of assessments during this psychological evaluation: World Health Organization Disability Assessment Schedule 2.0 12-item (WHODAS), Patient Health Questionnaire-9 (PHQ-9), Generalized Anxiety Disorder-7 screener (TRINITY-7), CAGE Questionnaire Adapted to Include Drugs (CAGE-AID), and the [place] Multiphasic Personality Fbmutbaea-2-Mciecnubdoes Form (MMPI-2-RF).     The WHODAS measures disability and functional impairment due to health conditions including diseases, illnesses, injuries, mental or emotional problems, and problems with alcohol or drugs. The possible range of scores is 12-60 and higher scores indicate higher levels of disability.           5/11/2023    11:21 AM   WHODAS 2.0 Total Score   Total Score 12   Total Score MyChart 12         The PHQ-9 is an instrument for screening, diagnosing, monitoring and measuring the severity of depression. Scores of 5, 10, 15, and 20 represent cutpoints for mild, moderate, moderately severe and severe depressive symptoms, respectively.           5/11/2023    11:18 AM   PHQ   PHQ-9 Total Score 1   Q9: Thoughts of better off dead/self-harm past 2 weeks Not at all       The TRINITY-7 is an instrument for screening, diagnosing, monitoring and measuring the severity of anxiety. Scores of 5, 10, and 15 represent cutpoints for mild, moderate, and severe anxiety symptoms, respectively.        8/10/2012     2:17 PM 5/11/2023    11:18 AM   TRINITY-7 SCORE   Total Score 12    Total Score  0 (minimal anxiety)   Total Score  0         The CAGE-AID questionnaire is used to screen for alcohol or drug abuse and dependence in adults. A CAGE-AID score  > 1 is a positive screen, suggesting further discussion is needed to determine if evaluation for alcohol or substance abuse is appropriate. A score > 2 is considered clinically significant, suggesting further evaluation of alcohol or substance-related problems is  indicated.          5/11/2023    11:21 AM   CAGE-AID Total Score   Total Score 0   Total Score MyChart 0 (A total score of 2 or greater is considered clinically significant)       CAGE-AID score  > 1 is a positive screen, suggesting further discussion is needed to determine if evaluation for alcohol or substance abuse is appropriate.  A score > 2 is considered clinically significant, suggesting further evaluation of alcohol or substance-related problems is indicated.    The MMPI-2-RF is a 338-item self-report instrument designed to be a broadband assessment of psychological functioning.     This assessment was administered remotely and will be incorporated into the results once it is received.    Scores on the MMPI-2-RF validity scales raise concerns about the possible impact of under-reporting on the validity of this protocol. The test taker presented herself in an extremely positive light by denying many minor faults and shortcomings that most people acknowledge. This level of virtuous self-presentation is very uncommon even in individuals with a background stressing traditional values. Any absence of elevation on the substantive scales is uninterpretableElevated scores on the substantive scales may underestimate the problems assessed by those scales. Emotional-internalizing findings relate to fears of animals. No SI endorsed.     MMPI-2-RF was received and scored on 6/2. Due to positive impression management, which can be common in preoperative bariatric surgery psychological evaluations, the patient s MMPI-2 profile may not reliably depict her mental health. Clinical interview is likely more representative. I recommend ongoing engagement in therapy for mood monitoring and maintenance, which she plans to do.    Mental Status Examination:  Appearance/Behavior/Orientation: Patient was on time, appropriately groomed and dressed, and demonstrated good eye contact. Alert and oriented to person, place, time, and  situation. No evidence of psychomotor agitation.     Cooperation/Reliability: Patient was open and cooperative.   Speech/Language: Speech was clear, coherent, and of normal rate, rhythm and volume.    Thought Form: Overall logical and organized.   Thought Content: Appropriate to interview and situation  Cognition/Memory: Not formally assessed, but no difficulties apparent upon interview.   Attention/Concentration: Good throughout interview.    Fund of knowledge: Consistent with age and level of education.    Abstract reasoning: Not assessed.   Judgment: Intact.    Mood/Affect: Mood euthymic; appropriate range of affect.    Insight/Motivation: Good insight into influence of emotions and behavior on weight. Motivation for bariatric surgery appears high   Suicide/Assault: Patient denies suicidal or assaultive ideation, plan, or intent    Impression:  Siddharth Fontanez is a 33 year old female with obesity considering laparoscopic sleeve gastrectomy.  She presents with good knowledge about the surgical procedure, risks and endorses realistic expectations for outcomes.  She presents with a history of depression, anxiety and trauma with mental health symptoms currently stable and is currently engaged in psychotherapy.  She plans to continue seeing this provider postoperatively, which will serve as an added resource for postoperative adjustment issues should they arise.  Her mental health provider currently supports her decision to pursue surgery.  She endorses no concerns related to substance abuse or suicidal ideation and behavior.  Her social support appears good and she has a stable home environment and support network.  No contraindications indicated during this assessment.     Diagnosis:  Depressive disorder, unspecified  Anxiety disorder, unspecified  Psychological factors affecting medical condition (F54)  Obesity    Recommendation/Plan:  Siddharth Fontanez presents as a reasonably good candidate for bariatric surgery at this  time and is recommended for weight loss surgery.  I recommend that she continue engaging in her mental health care as scheduled for ongoing maintenance of depression and anxiety, she agreed to do this as planned.    Spoke with patient on 6/2 to provide feedback.    Connie Regalado, PhD,   Clinical Health Psychologist    *In accordance with the Rules of the [place] Board of [employer], it is noted that psychological descriptions and scientific procedures underlying psychological evaluations have limitations.  Absolute predictions cannot be made based on information in this report.      *no letter    This note was completed using Dragon voice recognition software.  Although reviewed after completion, some word and grammatical errors may occur.

## 2023-05-12 ENCOUNTER — TELEPHONE (OUTPATIENT)
Dept: SURGERY | Facility: CLINIC | Age: 33
End: 2023-05-12
Payer: COMMERCIAL

## 2023-05-12 NOTE — TELEPHONE ENCOUNTER
Called pt's therapist Ari Wright to inform him we have received his letter of support.    Jasmin Saldana RN

## 2023-05-13 PROBLEM — M54.41 ACUTE BILATERAL LOW BACK PAIN WITH RIGHT-SIDED SCIATICA: Status: RESOLVED | Noted: 2023-02-03 | Resolved: 2023-05-13

## 2023-05-13 NOTE — PROGRESS NOTES
Patient did not return for further treatment and no additional progress was noted.  Please refer to the progress note and goal flowsheet completed on 02/27/23 for discharge information.

## 2023-07-03 ENCOUNTER — OFFICE VISIT (OUTPATIENT)
Dept: OPHTHALMOLOGY | Facility: CLINIC | Age: 33
End: 2023-07-03
Payer: COMMERCIAL

## 2023-07-03 DIAGNOSIS — H52.13 MYOPIA OF BOTH EYES: ICD-10-CM

## 2023-07-03 DIAGNOSIS — Z01.00 EXAMINATION OF EYES AND VISION: Primary | ICD-10-CM

## 2023-07-03 PROCEDURE — 92015 DETERMINE REFRACTIVE STATE: CPT | Performed by: OPHTHALMOLOGY

## 2023-07-03 PROCEDURE — 92004 COMPRE OPH EXAM NEW PT 1/>: CPT | Performed by: OPHTHALMOLOGY

## 2023-07-03 ASSESSMENT — CONF VISUAL FIELD
OS_INFERIOR_TEMPORAL_RESTRICTION: 0
OD_INFERIOR_TEMPORAL_RESTRICTION: 0
OS_SUPERIOR_NASAL_RESTRICTION: 0
OS_NORMAL: 1
OD_INFERIOR_NASAL_RESTRICTION: 0
OS_SUPERIOR_TEMPORAL_RESTRICTION: 0
OD_SUPERIOR_TEMPORAL_RESTRICTION: 0
OD_SUPERIOR_NASAL_RESTRICTION: 0
OD_NORMAL: 1
OS_INFERIOR_NASAL_RESTRICTION: 0

## 2023-07-03 ASSESSMENT — VISUAL ACUITY
OD_SC: 20/30
OS_SC: 20/30
OS_SC: J1
OD_SC: J1
OD_SC+: -2
METHOD: SNELLEN - LINEAR

## 2023-07-03 ASSESSMENT — REFRACTION_MANIFEST
OS_CYLINDER: SPHERE
OS_SPHERE: -1.00
OD_CYLINDER: SPHERE
OD_SPHERE: -1.00

## 2023-07-03 ASSESSMENT — EXTERNAL EXAM - LEFT EYE: OS_EXAM: NORMAL

## 2023-07-03 ASSESSMENT — EXTERNAL EXAM - RIGHT EYE: OD_EXAM: NORMAL

## 2023-07-03 ASSESSMENT — SLIT LAMP EXAM - LIDS
COMMENTS: NORMAL
COMMENTS: NORMAL

## 2023-07-03 NOTE — PATIENT INSTRUCTIONS
"Glasses prescription given - optional  May use artificial tears up to four times a day (like Refresh Optive, Systane Balance, TheraTears, or generic artificial tears are ok. Avoid \"get the red out\" drops).   Call in March 2025 for an appointment in July 2025 for Complete Exam    Dr. Cardenas [phone]    "

## 2023-07-03 NOTE — PROGRESS NOTES
" Current Eye Medications:  none     Subjective:  Complete exam: patient states her distance visual acuity is getting worse. Hard for her to drive at night.   Her last eye exam was 6 years ago.  No history of trauma or surgeries to her eyes.  She states her eyes are comfortable.     Objective:  See Ophthalmology Exam.       Assessment:  Baseline eye exam in patient with mild myopia.      ICD-10-CM    1. Examination of eyes and vision  Z01.00       2. Myopia of both eyes  H52.13            Plan:  Glasses prescription given - optional  May use artificial tears up to four times a day (like Refresh Optive, Systane Balance, TheraTears, or generic artificial tears are ok. Avoid \"get the red out\" drops).   Call in March 2025 for an appointment in July 2025 for Complete Exam    Dr. Cardenas [phone]       " Endoscope was pre-cleaned at the bedside immediately following procedure by Ephraim Aschoff.

## 2023-07-05 ENCOUNTER — PATIENT OUTREACH (OUTPATIENT)
Dept: CARE COORDINATION | Facility: CLINIC | Age: 33
End: 2023-07-05
Payer: COMMERCIAL

## 2023-07-05 ASSESSMENT — REFRACTION
OS_CYLINDER: +0.25
OS_SPHERE: -1.00
OD_SPHERE: -1.00
OS_AXIS: 097
OD_AXIS: 006
OD_CYLINDER: +0.25

## 2023-07-05 NOTE — PROGRESS NOTES
Clinic Care Coordination Contact  Program:  [facility]: [place]    Renewal: [facility]   Date Applied:     JELLY Outreach:   7/5/23: 1st outreach attempt. Left a message on voicemail with call back information and requested return call.  Plan: CTA will call again within 2 weeks.    Health Insurance:      Referral/Screening:

## 2023-07-06 NOTE — PROGRESS NOTES
7/10/2023      Return Medical Weight Management Note     Siddharth Fontanez  MRN:  [id number]  :  1990    Dear [facility],    I had the pleasure of seeing your patient Siddharth Fontanez. She is a 33 year old female who I am continuing to see for treatment of obesity related to:        3/19/2023     1:49 PM   --   I have the following health issues associated with obesity None of the above       Assessment & Plan   Problem List Items Addressed This Visit     Morbid obesity (H) - Primary     Patient pursuing Bariatric surgery - primarily interested in the sleeve procedure. Task list reviewed - still needs dietitian clearance. Discussed continue Topiramate until closer to surgery. Discussed having her MyChart once she has a surgery date and will taper off before surgery.         Relevant Medications    topiramate (TOPAMAX) 25 MG tablet    Migraine     Continue Topiramate         Relevant Medications    topiramate (TOPAMAX) 25 MG tablet   Other Visit Diagnoses     Chronic constipation        Refilled Senna per pt request    Relevant Medications    SENNA-docusate sodium (SENNA S) 8.6-50 MG tablet           PATIENT INSTRUCTIONS:  See dietitian  Restart Topamax - you can trial 50mg (two tablets) but if too drowsy restart at 25mg daily for one week, then 50mg daily for one week, then 75mg daily for then on.  Check your vitamins with your pharmacy - you should have refills to last 1 year. If you need a refill MyChart me.  Once you have your surgery date, let us know so we can taper you off the Topiramate. A typical taper schedule would be 50mg for three days, 25 mg for three days and then stop.    FOLLOW-UP:  Call [phone] to see dietitian.    30 minutes spent on the date of the encounter doing chart review, history and exam, result review, counseling, developing plan of care, documentation, and further activities as noted      INTERVAL HISTORY:  Siddharth returns for medical weight management follow up.  Last  seen on 3/24/23 virtually with Kisha Marrero PA-C. At that visit she started the bariatric surgery process and was started on Topiramate for medical weight management.    Since that time she has mostly completed the checklist as noted below.    BARIATRIC TASK LIST  Lose 10 lbs prior to surgery.  Goal Weight: 306 lbs completed     Have preoperative laboratory tests drawn. completed     Psychological Evaluation with MMPI and clearance for weight loss surgery. completed     Achieve clearance from dietitian to see surgeon. Needed (appears one more)     Letter of support from therapist. completed         Today we discussed available weight loss surgeries (both sleeve and bypass) including risks and benefits, had a weigh in and she has met her weight goal, we reviewed tasklist, and did a physical exam.     WEIGHT METRICS:  Body mass index is 48.59 kg/m .   Current Weight: 292 lb (132.5 kg)     Initial Weight (lbs): 316 lbs  Cumulative weight loss (lbs): 24  Weight Loss Percentage: 7.59%    Wt Readings from Last 10 Encounters:   07/10/23 292 lb (132.5 kg)   03/24/23 316 lb (143.3 kg)   03/24/23 316 lb (143.3 kg)   03/08/23 316 lb (143.3 kg)   02/10/23 316 lb (143.3 kg)   01/24/23 312 lb (141.5 kg)   01/06/23 312 lb 14.4 oz (141.9 kg)   11/29/22 320 lb (145.2 kg)   06/20/22 320 lb (145.2 kg)   04/11/22 320 lb 1.6 oz (145.2 kg)            7/9/2023    10:19 PM   Weight Loss Medication History Reviewed With Patient   Which weight loss medications are you currently taking on a regular basis? Topamax (topiramate)   If you are not taking a weight loss medication that was prescribed to you, please indicate why: Other   Are you having any side effects from the weight loss medication that we have prescribed you? No           7/9/2023    10:19 PM   Changes and Difficulties   I have made the following changes to my diet since my last visit: More vegetables, fruits , water , protein ,   With regards to my diet, I am still struggling  with: Drinking 30 mins aftwr a meal   I have made the following changes to my activity/exercise since my last visit: Working out at home often   With regards to my activity/exercise, I am still struggling with: Doing more heavy work out at the gym         MEDICATIONS:   Current Outpatient Medications   Medication Sig Dispense Refill     calcium carbonate-vitamin D (CALCIUM 600 + D) 600-10 MG-MCG per tablet Take 1 tablet by mouth 2 times daily Take one twice daily and at least 2 hours apart from iron 180 tablet 3     cholecalciferol 125 MCG (5000 UT) CAPS Take 1 capsule (5,000 Units) by mouth daily 90 capsule 3     desogestrel-ethinyl estradiol (APRI) 0.15-30 MG-MCG tablet Take 1 tablet by mouth daily 84 tablet 3     hydrOXYzine (ATARAX) 25 MG tablet Take 1 tablet (25 mg) by mouth nightly as needed for other (sleeping) 8 tablet 0     Multiple Vitamins-Iron (QC DAILY MULTIVITAMINS/IRON) TABS Take 2 tablets by mouth daily 180 tablet 3     SENNA-docusate sodium (SENNA S) 8.6-50 MG tablet Take 2 tablets by mouth 2 times daily 120 tablet 2     topiramate (TOPAMAX) 25 MG tablet Take 25 mg week, increase to 50 mg week 2, then increase to 75 mg week 3 and beyond 90 tablet 2       LABS:  Hemoglobin A1C   Date Value Ref Range Status   04/02/2023 5.8 (H) 0.0 - 5.6 % Final     Comment:     Normal <5.7%   Prediabetes 5.7-6.4%    Diabetes 6.5% or higher     Note: Adopted from ADA consensus guidelines.     Vitamin D, Total (25-Hydroxy)   Date Value Ref Range Status   04/02/2023 20 20 - 75 ug/L Final     TSH   Date Value Ref Range Status   10/31/2018 2.44 0.40 - 4.00 mU/L Final     Sodium   Date Value Ref Range Status   01/27/2023 137 133 - 144 mmol/L Final   02/10/2020 140 133 - 144 mmol/L Final     Potassium   Date Value Ref Range Status   01/27/2023 3.9 3.4 - 5.3 mmol/L Final   02/10/2020 3.2 (L) 3.4 - 5.3 mmol/L Final     Chloride   Date Value Ref Range Status   01/27/2023 102 94 - 109 mmol/L Final   02/10/2020 107 94  109  mmol/L Final     Carbon Dioxide   Date Value Ref Range Status   02/10/2020 29 20 - 32 mmol/L Final     Carbon Dioxide (CO2)   Date Value Ref Range Status   01/27/2023 29 20 - 32 mmol/L Final     Anion Gap   Date Value Ref Range Status   01/27/2023 6 3 - 14 mmol/L Final   02/10/2020 4 3 - 14 mmol/L Final     Glucose   Date Value Ref Range Status   01/27/2023 108 (H) 70 - 99 mg/dL Final   02/10/2020 105 (H) 70 - 99 mg/dL Final     Urea Nitrogen   Date Value Ref Range Status   01/27/2023 9 7 - 30 mg/dL Final   02/10/2020 10 7 - 30 mg/dL Final     Creatinine   Date Value Ref Range Status   01/27/2023 0.80 0.52 - 1.04 mg/dL Final   02/10/2020 0.90 0.52 - 1.04 mg/dL Final     GFR Estimate   Date Value Ref Range Status   01/27/2023 >90 >60 mL/min/1.73m2 Final     Comment:     eGFR calculated using 2021 CKD-EPI equation.   02/10/2020 84 >60 mL/min/[1.73_m2] Final     Comment:     Non  GFR Calc  Starting 12/18/2018, serum creatinine based estimated GFR (eGFR) will be   calculated using the Chronic Kidney Disease Epidemiology Collaboration   (CKD-EPI) equation.       Calcium   Date Value Ref Range Status   01/27/2023 8.8 8.5 - 10.1 mg/dL Final   02/10/2020 8.1 (L) 8.5 - 10.1 mg/dL Final     Bilirubin Total   Date Value Ref Range Status   04/02/2023 0.3 <=1.2 mg/dL Final   02/24/2019 0.3 0.2 - 1.3 mg/dL Final     Alkaline Phosphatase   Date Value Ref Range Status   04/02/2023 121 (H) 35 - 104 U/L Final   02/24/2019 128 40 - 150 U/L Final     ALT   Date Value Ref Range Status   04/02/2023 115 (H) 10 - 35 U/L Final   02/24/2019 18 0 - 50 U/L Final     AST   Date Value Ref Range Status   04/02/2023 72 (H) 10 - 35 U/L Final   02/24/2019 16 0 - 45 U/L Final     Cholesterol   Date Value Ref Range Status   08/09/2018 140 <200 mg/dL Final     HDL Cholesterol   Date Value Ref Range Status   08/09/2018 45 (L) >49 mg/dL Final     LDL Cholesterol Calculated   Date Value Ref Range Status   08/09/2018 79 <100 mg/dL Final      "Comment:     Desirable:       <100 mg/dl     Triglycerides   Date Value Ref Range Status   08/09/2018 78 <150 mg/dL Final     WBC   Date Value Ref Range Status   02/10/2020 13.4 (H) 4.0 - 11.0 10e9/L Final     WBC Count   Date Value Ref Range Status   01/27/2023 10.1 4.0 - 11.0 10e3/uL Final     Hemoglobin   Date Value Ref Range Status   01/27/2023 12.4 11.7 - 15.7 g/dL Final   02/10/2020 9.0 (L) 11.7 - 15.7 g/dL Final     Hematocrit   Date Value Ref Range Status   01/27/2023 39.9 35.0 - 47.0 % Final   02/10/2020 30.5 (L) 35.0 - 47.0 % Final     MCV   Date Value Ref Range Status   01/27/2023 86 78 - 100 fL Final   02/10/2020 81 78 - 100 fl Final     Platelet Count   Date Value Ref Range Status   01/27/2023 402 150 - 450 10e3/uL Final   02/10/2020 478 (H) 150 - 450 10e9/L Final         BP Readings from Last 6 Encounters:   07/10/23 131/88   03/08/23 110/64   02/10/23 104/76   01/27/23 120/70   01/24/23 (!) 142/86   01/06/23 126/78       Pulse Readings from Last 6 Encounters:   07/10/23 88   01/24/23 94   01/06/23 (!) 130   12/20/22 70   11/29/22 82   06/20/22 86       PE:  /88 (BP Location: Left arm, Patient Position: Chair, Cuff Size: Adult Large)   Pulse 88   Ht 5' 5\" (1.651 m)   Wt 292 lb (132.5 kg)   SpO2 97%   BMI 48.59 kg/m    GENERAL: Healthy, alert and no distress  EYES: Eyes grossly normal to inspection.  No discharge or erythema, or obvious scleral/conjunctival abnormalities.  RESP: No audible wheeze, cough, or visible cyanosis.  No visible retractions or increased work of breathing.  Lungs clear to auscultation  Oral: Good dentition, noted lower molars #30 and #19 absent on lower jaw to either side. No other missing molars. Dentition otherwise appears good.  Neck: No obvious swellings or enlarged thyroid.  Heart: regular rate and rhythm. No significant murmurs, rubs, or gallops  Abdomen: Soft and non distended. No significant organomegaly or hernias palpated. No tenderness.   SKIN: Visible skin " clear. No significant rash, abnormal pigmentation or lesions.  NEURO: Mentation and speech appropriate for age.  PSYCH: Mentation appears normal, affect normal/bright, judgement and insight intact, normal speech and appearance well-groomed.      Sincerely,      Juana Llamas PA-C

## 2023-07-07 ENCOUNTER — TELEPHONE (OUTPATIENT)
Dept: SURGERY | Facility: CLINIC | Age: 33
End: 2023-07-07
Payer: COMMERCIAL

## 2023-07-07 NOTE — TELEPHONE ENCOUNTER
I called patient and left message to complete questionnaires prior to appointment. Abi Lepe, [facility]

## 2023-07-10 ENCOUNTER — PATIENT OUTREACH (OUTPATIENT)
Dept: CARE COORDINATION | Facility: CLINIC | Age: 33
End: 2023-07-10
Payer: COMMERCIAL

## 2023-07-10 ENCOUNTER — OFFICE VISIT (OUTPATIENT)
Dept: SURGERY | Facility: CLINIC | Age: 33
End: 2023-07-10
Payer: COMMERCIAL

## 2023-07-10 VITALS
OXYGEN SATURATION: 97 % | BODY MASS INDEX: 48.65 KG/M2 | HEART RATE: 88 BPM | SYSTOLIC BLOOD PRESSURE: 131 MMHG | WEIGHT: 292 LBS | DIASTOLIC BLOOD PRESSURE: 88 MMHG | HEIGHT: 65 IN

## 2023-07-10 DIAGNOSIS — E66.01 MORBID OBESITY (H): Primary | ICD-10-CM

## 2023-07-10 DIAGNOSIS — K59.09 CHRONIC CONSTIPATION: ICD-10-CM

## 2023-07-10 DIAGNOSIS — G43.009 MIGRAINE WITHOUT AURA AND WITHOUT STATUS MIGRAINOSUS, NOT INTRACTABLE: ICD-10-CM

## 2023-07-10 PROCEDURE — 99214 OFFICE O/P EST MOD 30 MIN: CPT | Performed by: PHYSICIAN ASSISTANT

## 2023-07-10 RX ORDER — TOPIRAMATE 25 MG/1
TABLET, FILM COATED ORAL
Qty: 90 TABLET | Refills: 2 | Status: SHIPPED | OUTPATIENT
Start: 2023-07-10 | End: 2024-01-22

## 2023-07-10 RX ORDER — SENNA AND DOCUSATE SODIUM 50; 8.6 MG/1; MG/1
2 TABLET, FILM COATED ORAL 2 TIMES DAILY
Qty: 120 TABLET | Refills: 2 | Status: SHIPPED | OUTPATIENT
Start: 2023-07-10 | End: 2023-11-14

## 2023-07-10 NOTE — ASSESSMENT & PLAN NOTE
Patient pursuing Bariatric surgery - primarily interested in the sleeve procedure. Task list reviewed - still needs dietitian clearance. Discussed continue Topiramate until closer to surgery. Discussed having her MyChart once she has a surgery date and will taper off before surgery.

## 2023-07-10 NOTE — PATIENT INSTRUCTIONS
Nice to meet you today.  Below is our plan we discussed.-  Juana Llamas PA-C   Bariatric Task List  See dietitian  Restart Topamax - you can trial 50mg (two tablets) but if too drowsy restart at 25mg daily for one week, then 50mg daily for one week, then 75mg daily for then on.  Check your vitamins with your pharmacy - you should have refills to last 1 year. If you need a refill MyChart me.  Once you have your surgery date, let us know so we can taper you off the Topiramate. A typical taper schedule would be 50mg for three days, 25 mg for three days and then stop.  FOLLOW-UP:  Call [phone] to see dietitian.  _________________________________________________  In general before being submitted for insurance approval, you will need the following:  -Clearance by the Psychologist  -Clearance by the dietitian  -Surgeon consult  -Use CPAP for at least one month before surgery if you have sleep apnea. Bring to hospital day of surgery.  -Tobacco free for 3 months before surgery and remain a non-smoker thereafter.   -If you have diabetes, A1C less than 8 before surgery  ____________________________________________________________________  After Bariatric Surgery:   Vitamin supplementation is a lifelong need. You will take:  B-12 daily or by injection monthly  Vitamin D3 5000 IU daily   2 Multivitamins containing 18mg of iron  Calcium citrate 2 daily  Thiamine 100 mg weekly   Vitron C once daily if you are a menstruating female  Follow up is impotent to keep your weight off and your vitamin levels up.  Your labs will be monitored every 3 months for the first year and yearly thereafter.  Prevent Ulcers by avoiding tobacco and anti-inflammatories (NSAIDS) forever.  Also alcohol and caffeine in excess. NSAIDS examples: Aspirin, Ibuprofen, Naproxen) Tylenol is fine.    Protect you stomach if on Asprin.  If on Aspirin to protect your heart or for another reason, make sure to take a PPI like (omeprazole, protonix, nexium,  prevacid) to protect your stomach.    Alcohol affects you differently. There is a 10% increase of Alcohol Use Disorder in patients with bariatric surgery.   If you have even ONE drink DO NOT DRIVE.

## 2023-07-10 NOTE — PROGRESS NOTES
Clinic Care Coordination Contact  Program:  [facility]: [place]    Renewal: [employer]   Date Applied:     FRW Outreach:   7/10/23: 2nd outreach attempt. Left message on voicemail indicating last outreach attempt. CTA left [facility] number for renewal follow up.  Plan: CTA will no longer make outreach     7/5/23: 1st outreach attempt. Left a message on voicemail with call back information and requested return call.  Plan: CTA will call again within 2 weeks.    Health Insurance:      Referral/Screening:

## 2023-07-16 DIAGNOSIS — G47.00 INSOMNIA, UNSPECIFIED TYPE: ICD-10-CM

## 2023-07-17 ENCOUNTER — VIRTUAL VISIT (OUTPATIENT)
Dept: SURGERY | Facility: CLINIC | Age: 33
End: 2023-07-17
Payer: COMMERCIAL

## 2023-07-17 VITALS — HEIGHT: 65 IN | WEIGHT: 292 LBS | BODY MASS INDEX: 48.65 KG/M2

## 2023-07-17 DIAGNOSIS — E66.01 MORBID OBESITY (H): Primary | ICD-10-CM

## 2023-07-17 PROCEDURE — 97803 MED NUTRITION INDIV SUBSEQ: CPT | Mod: 95

## 2023-07-17 NOTE — PROGRESS NOTES
"Siddharth is a 33 year old who is being evaluated via a billable video visit.      How would you like to obtain your AVS? MyChart  If the video visit is dropped, the invitation should be resent by: Text to cell phone: [phone]  Will anyone else be joining your video visit? No        Video-Visit Details    Type of service:  Video Visit   Video Start Time: 10:30am  Video End Time:10:48am    Originating Location (pt. Location): Home    Distant Location (provider location):  Off-site  Platform used for Video Visit: [facility]      PRE SURGICAL WEIGHT LOSS NUTRITION APPOINTMENT    Siddharth Fontanez  1990  female  [id number]  33 year old    ASSESSMENT    Desired Surgical Procedure: gastric sleeve    REASON FOR VISIT:  Siddharth Fontanez is a 33 year old year old female presents today for a pre-surgical weight loss follow-up appointment. Patient accompanied by self.    DIAGNOSIS:  Weight Status Obesity Grade III BMI >40    ANTHROPOMETRICS:  Height: 65\"   Initial Weight: 316 lbs     Weight last visit: n/a    Current weight: 292 lbs 0 oz   BMI: Body mass index is 48.59 kg/m .    VITAMINS AND MINERALS:  None (ran out of Rx - plans to  this week)    NUTRITION HISTORY:  Breakfast: eggs + fruit + avocado  breakfast potatoes in air fryer + eggs  hard boiled eggs  tuna sandwich  overnight oats  smoothies  Lunch: pesto pasta  chicken sandwich  chicken salad  fish  stir oleary  spinach blas  tuna melt  avocado toast  veggie quesedilla  Supper: oatmeal or cereal + fruit  sandwich  boiled eggs + avocado toast or mixed vegetables   Snacks: [evenings] fruit, rare sweets   Fluids Consumed: water (60-100oz)  Eating slower: Yes  Chewing foods thoroughly: Yes  Take 20-30 minutes to consume each meal: Yes  Fluids and meals separate by at least 30 minutes: Yes  Carbonation: none  Caffeine: none  Additional Information: Pt consistent in behavior changes. Reviewed post-op diet advancement and vitamin/mineral changes.     PHYSICAL " ACTIVITY:  Type: walking, gym membership, home videos, yoga   Frequency: 7 (days per week)  Duration: 30-40(minutes)     DIAGNOSIS:  Previous Nutrition Diagnosis: Obesity related to long history of self- monitoring deficit and excessive energy intake evidenced by previous BMI of >40 kg/m2  No change, modified below    Previous goals:   Take all pre-op vitamins per guidelines - not met  Practice plate method for portion control - improving  Separate fluids and meals by 30 minutes - met  Eat slowly and chew well - met    Current Nutrition Diagnosis: Obesity related to long history of self-monitoring deficit and excessive energy intake as evidenced by BMI of 48.59 kg/m2.    INTERVENTION:  Nutrition Prescription: Recommended energy/nutrient modification.    GOALS:  Restart pre-op vitamins  Continue to practice all pre-op guidelines    Intervention:  - Discussed progress towards previous goals.  - Reinforced importance of making behavior changes in preparation for bariatric surgery.   - Assessed learning needs and learning preferences       NUTRITION MONITORING AND EVALUATION:  Anticipated compliance: good  Patient demonstrated good understanding.   Patient has met pre bariatric surgery diet requirements    Follow up: Continue to monitor patient closely regarding weight loss and diet.  # of visits needed: 0  Cleared by RD: Yes     TIME SPENT WITH PATIENT: 18 minutes      Nithya Walker RD, LD  Clinical Dietitian

## 2023-07-17 NOTE — PATIENT INSTRUCTIONS
Macario Messina!        Great seeing you again today. Congratulations on dietary clearance! Up until surgery, focus on the followin. Continue to take all pre-op vitamins/minerals  - Multivitamin: once daily  - Calcium: 600mg 2x/day. Take at least 2 hours apart from multivitamin.  - Vitamin D: 5000 international unit(s) per day     2. Continue to eat slowly and chew your food well     3. Continue to separate fluids and meals by 30 minutes     4. Remain caffeine/carbonation-free              Here's some information on post-op vitamins/minerals and diet advancement. We'll also review this with you at your pre-op nursing class.      Vitamins and Minerals  [email or web address]     Your Stage 2 Diet: Low-fat Full Liquids  [email or web address]                  Otherwise, keep up the great work and let us know if you need anything. Onur () will be your point of contact, moving forward. Have a great day and I ll see you after surgery!        Nithya Walker, LORENZO, LD??  Clinical Dietitian

## 2023-07-19 RX ORDER — HYDROXYZINE HYDROCHLORIDE 25 MG/1
TABLET, FILM COATED ORAL
Qty: 8 TABLET | Refills: 0 | Status: SHIPPED | OUTPATIENT
Start: 2023-07-19 | End: 2023-10-09

## 2023-07-20 ENCOUNTER — TELEPHONE (OUTPATIENT)
Dept: SURGERY | Facility: CLINIC | Age: 33
End: 2023-07-20

## 2023-07-27 ENCOUNTER — APPOINTMENT (OUTPATIENT)
Dept: GENERAL RADIOLOGY | Facility: CLINIC | Age: 33
End: 2023-07-27
Payer: COMMERCIAL

## 2023-07-27 ENCOUNTER — HOSPITAL ENCOUNTER (EMERGENCY)
Facility: CLINIC | Age: 33
Discharge: HOME | End: 2023-07-27
Payer: COMMERCIAL

## 2023-07-27 VITALS
DIASTOLIC BLOOD PRESSURE: 70 MMHG | SYSTOLIC BLOOD PRESSURE: 127 MMHG | HEART RATE: 78 BPM | RESPIRATION RATE: 16 BRPM | OXYGEN SATURATION: 98 % | TEMPERATURE: 97.8 F

## 2023-07-27 DIAGNOSIS — J06.9 VIRAL UPPER RESPIRATORY TRACT INFECTION: ICD-10-CM

## 2023-07-27 LAB
FLUAV RNA SPEC QL NAA+PROBE: NEGATIVE
FLUBV RNA RESP QL NAA+PROBE: NEGATIVE
GROUP A STREP BY PCR: NOT DETECTED
RSV RNA SPEC NAA+PROBE: NEGATIVE
SARS-COV-2 RNA RESP QL NAA+PROBE: NEGATIVE

## 2023-07-27 PROCEDURE — 87637 SARSCOV2&INF A&B&RSV AMP PRB: CPT

## 2023-07-27 PROCEDURE — 87651 STREP A DNA AMP PROBE: CPT

## 2023-07-27 PROCEDURE — 71046 X-RAY EXAM CHEST 2 VIEWS: CPT

## 2023-07-27 PROCEDURE — 99284 EMERGENCY DEPT VISIT MOD MDM: CPT | Mod: 25

## 2023-07-27 ASSESSMENT — ACTIVITIES OF DAILY LIVING (ADL): ADLS_ACUITY_SCORE: 35

## 2023-07-28 NOTE — ED TRIAGE NOTES
Pt reports cough, sore throat, and right sided chest tightness since last night. Reports fever last night, but did not measure temp     Triage Assessment       Row Name 07/27/23 1914       Respiratory WDL    Respiratory WDL X  cough, sore throat       Cardiac WDL    Cardiac WDL X  right sided chest tightness       Cognitive/Neuro/Behavioral WDL    Cognitive/Neuro/Behavioral WDL WDL

## 2023-07-28 NOTE — ED PROVIDER NOTES
History   Chief Complaint:  Cough    The history is provided by the patient.      Henry Bautista is a 33 year old female who presents with cough and sore throat.. Patient reports that her sore throat onset 2 days ago and notes that she was around a friend who had a sore throat. Denies known strep exposures.  She has no acute voice change.  No drooling or stridor.  Denies any headache or neck stiffness.  No nausea, vomiting or abdominal pain.  Patient mentions she also has a cough.  She has experienced a bit of right chest discomfort that is worse when she pushes on her right chest.  She denies any history of a blood clot or family history of a blood clot.  No recent surgeries, immobilizations, long distance travel or hormone use.  Patient had mentioned subjective fever at home.  She last used Tylenol yesterday.  Has not used any antipyretic medication today.    Independent Historian:   None - Patient Only    Review of External Notes:   None    Medications:    The patient has no daily or prescription medications.     Past Medical History:    The patient has no past pertinent medical history.     Physical Exam   Patient Vitals for the past 24 hrs:   BP Temp Temp src Pulse Resp SpO2   07/27/23 2139 127/70 -- -- 78 16 98 %   07/27/23 1916 132/66 97.8  F (36.6  C) Temporal 88 16 98 %     Physical Exam  General: Nontoxic-appearing adult female.  HENT:   Head: No facial swelling.  Mouth/Throat: Patient's posterior oropharynx mildly erythematous.  No tonsillar exudate or hypertrophy.  No acute voice change, drooling or stridor.  Eyes: Conjunctive and EOM normal. PERRLA.  Neck: Normal ROM. No rigidity.  CV: Regular rate and rhythm.   Resp: Lungs clear to auscultation bilaterally. Normal respiratory effort.  Occasional cough observed in the room.  GI:  Abdomen soft, non distended and nontender. No rebound or guarding.  MSK: Patient has some reproducible chest wall tenderness in the upper right chest.  Skin: Warm and dry.  No  rash.  Neuro: Awake, alert, oriented x 3.  Psych: Normal mood and affect.      Emergency Department Course     Imaging:  Chest XR,  PA & LAT   Final Result   IMPRESSION: Negative chest.         Report per radiology    Laboratory:  Labs Ordered and Resulted from Time of ED Arrival to Time of ED Departure   INFLUENZA A/B, RSV, & SARS-COV2 PCR - Normal       Result Value    Influenza A PCR Negative      Influenza B PCR Negative      RSV PCR Negative      SARS CoV2 PCR Negative     GROUP A STREPTOCOCCUS PCR THROAT SWAB - Normal    Group A strep by PCR Not Detected       Emergency Department Course & Assessments:    Interventions:  Medications - No data to display     Assessments:  1938 I obtained history and examined the patient as noted above.   2201 I rechecked the patient. I discussed findings and discharge with the patient. All questions answered.      Independent Interpretation (X-rays, CTs, rhythm strip):  X-ray with no infiltrate, pleural effusion, pneumothorax, cardiomegaly or widened mediastinum.    Consultations/Discussion of Management or Tests:  None     Social Determinants of Health affecting care:   None    Disposition:  The patient was discharged to home.     Impression & Plan    Medical Decision Making:  Patient is an otherwise healthy 33 year old female who presents for evaluation of cough and sore throat per HPI above. This is consistent with an upper respiratory tract infection.  On arrival here she is afebrile and nontoxic-appearing without any antipyretic medication to macrocystic a fever.  On exam she has evidence of pharyngitis but no sign of a peritonsillar abscess and no acute voice change drooling or stridor concerning for epiglottitis.  Strep test obtained and negative.  COVID, influenza and RSV test obtained and also negative.  Given her cough and mild right sided chest discomfort, I did obtain a chest x-ray.  This was fortunately negative for any infiltrate or other acute abnormality.   Patient is PERC negative with normal vitals and I did not pursue dimer testing for PE.  Her mild chest discomfort was in the right upper chest and was clearly reproducible with palpation, therefore I did not pursue full cardiac work-up.  Discussed symptomatic treatment.  Patient does not have established primary care so provided a referral.  Return to ED if any new or worsening symptoms, fever >100.4, neck stiffness, increasing weakness, etc.    Diagnosis:    ICD-10-CM    1. Viral upper respiratory tract infection  J06.9 Primary Care Referral        Scribe Disclosure:  DAXA, Breanne Newby & Bailee Reynoso, am serving as a scribe at 8:06 PM on 7/27/2023 to document services personally performed by Aleyda Mathews PA-C based on my observations and the provider's statements to me.     7/27/2023   Aleyda Mathews PA-C Dewing, Jennifer C, PA-C  07/28/23 0009

## 2023-07-28 NOTE — DISCHARGE INSTRUCTIONS

## 2023-08-03 ENCOUNTER — OFFICE VISIT (OUTPATIENT)
Dept: SURGERY | Facility: CLINIC | Age: 33
End: 2023-08-03
Payer: COMMERCIAL

## 2023-08-03 VITALS
SYSTOLIC BLOOD PRESSURE: 122 MMHG | BODY MASS INDEX: 48.55 KG/M2 | HEIGHT: 65 IN | WEIGHT: 291.4 LBS | DIASTOLIC BLOOD PRESSURE: 78 MMHG | HEART RATE: 90 BPM

## 2023-08-03 DIAGNOSIS — E66.01 MORBID OBESITY (H): Primary | ICD-10-CM

## 2023-08-03 PROCEDURE — 99214 OFFICE O/P EST MOD 30 MIN: CPT | Performed by: SURGERY

## 2023-08-06 NOTE — PROGRESS NOTES
"Dear Dr. Brewer, [facility] [place],      Referring provider:        No data to display              I was asked to see the patient regarding obesity by the referring provider above.    I had the pleasure of meeting with your patient Siddharth Fontanez in our weight loss surgery office.  This patient is a 33 year old female who has been undergoing our thorough preoperative screening process in anticipation of potential bariatric surgery.    At initial evaluation we recorded Siddharth Fontanez's Height: 165.1 cm (5' 5\") (Pt reported), current weight 291 pounds   and BMI 48.49 .  The patient has been unsuccessful with other methods of permanent weight loss and suffers from multiple weight related medical conditions.  Due to lack of success in achieving weight loss through other methods, she is interested in undergoing bariatric surgery.    PREVIOUS WEIGHT LOSS ATTEMPTS:      3/19/2023     1:49 PM   --   I have tried the following methods to lose weight Watching portions or calories    Exercise    Atkins type diet (low carb/high protein)    Pre packaged meals ex: Nutrisystem    Slimfast    OTC Medications    Prescription Medications       CO-MORBIDITIES OF OBESITY INCLUDE:      3/19/2023     1:49 PM   --   I have the following health issues associated with obesity None of the above       VITALS:  /78   Pulse 90   Ht 1.651 m (5' 5\")   Wt 132.2 kg (291 lb 6.4 oz)   BMI 48.49 kg/m      PE:  GENERAL: Alert and oriented x3. NAD  HEENT exam: Sclerae not icteric. Hearing good. Head normocephalic and atraumatic.   CARDIOVASCULAR: No JVD  RESPIRATORY: Breathing unlabored  GASTROINTESTINAL: Obese  LOWER EXTREMITIES: no deformities  MUSCULOSKELETAL: Normal gait, Moves all 4 extremities equal and strong  NEUROLOGIC: no gross defect  SKIN: warm and dry to touch     In summary, she has undergone an appropriate medical evaluation, dietitian evaluation, as well as psychologic screening. The patient appears to be an appropriate " candidate for bariatric surgery.    In the office today, I discussed the  robotic assisted laparoscopic sleeve gastrectomy  surgery.  Risks, benefits and anticipated outcomes were outlined including the risk of death, staple line leak (1-2%), PE, DVT, ulcer, worsening GERD, N/V, stricture, hernia, wound infection, weight regain, and vitamin deficiencies. This patient has a good chance of sustaining permanent weight loss due to this procedure.  This should also allow improvement if not resolution of his/her weight related medical conditions.    At present we are going to present your patient's file for prior authorization to insurance.  Pending prior authorization, I anticipate a surgical date in the near future.  We will keep you updated on any progress.  If you have questions regarding care please feel free to contact me.  Total time spent in the clinic was 45 minutes with greater than 50% in face-to-face consultation.        Sincerely,    Vinay Francisco MD    Please route or send letter to:  Primary Care Provider (PCP) and Referring Provider

## 2023-08-31 ENCOUNTER — PREP FOR PROCEDURE (OUTPATIENT)
Dept: SURGERY | Facility: CLINIC | Age: 33
End: 2023-08-31
Payer: COMMERCIAL

## 2023-08-31 ENCOUNTER — PREP FOR PROCEDURE (OUTPATIENT)
Dept: SURGERY | Facility: CLINIC | Age: 33
End: 2023-08-31

## 2023-10-02 NOTE — PROGRESS NOTES
Virtual bariatric pre op class completed.  Class power point and patient checklist information gone over with patient.    All questions were answered.  Quiz was completed.    Patient was advised to call if further questions.  Tootie Sprague MS, RD, RN

## 2023-10-06 ENCOUNTER — ALLIED HEALTH/NURSE VISIT (OUTPATIENT)
Dept: SURGERY | Facility: CLINIC | Age: 33
End: 2023-10-06
Payer: COMMERCIAL

## 2023-10-06 DIAGNOSIS — E66.01 MORBID OBESITY (H): Primary | ICD-10-CM

## 2023-10-06 PROCEDURE — 99207 PR NO CHARGE NURSE ONLY: CPT

## 2023-10-09 NOTE — PROGRESS NOTES
PTA medications updated by Medication Scribe prior to surgery via phone call with patient (last doses completed by Nurse)     Medication history sources: Patient, Surescripts, and H&P  In the past week, patient estimated taking medication this percent of the time: Greater than 90%      Significant changes made to the medication list:  None      Additional medication history information:   None    Medication reconciliation completed by provider prior to medication history? No    Time spent in this activity: 30 MINUTES    The information provided in this note is only as accurate as the sources available at the time of update(s)      Prior to Admission medications    Medication Sig Last Dose Taking? Auth Provider Long Term End Date   calcium carbonate-vitamin D (CALCIUM 600 + D) 600-10 MG-MCG per tablet Take 1 tablet by mouth 2 times daily Take one twice daily and at least 2 hours apart from iron  at PM Yes Kisha Marrero PA-C     cholecalciferol 125 MCG (5000 UT) CAPS Take 1 capsule (5,000 Units) by mouth daily  at AM Yes Kisha Marrero PA-C     desogestrel-ethinyl estradiol (APRI) 0.15-30 MG-MCG tablet Take 1 tablet by mouth daily  at PM Yes Michael Liu MD Yes    Multiple Vitamins-Iron (QC DAILY MULTIVITAMINS/IRON) TABS Take 2 tablets by mouth daily  at AM Yes Kisha Marrero PA-C     SENNA-docusate sodium (SENNA S) 8.6-50 MG tablet Take 2 tablets by mouth 2 times daily  at PM Yes Juana Llamas PA-C     topiramate (TOPAMAX) 25 MG tablet Take 25 mg week, increase to 50 mg week 2, then increase to 75 mg week 3 and beyond  Yes Juana Llamas PA-C Yes

## 2023-10-10 ENCOUNTER — APPOINTMENT (OUTPATIENT)
Dept: SURGERY | Facility: PHYSICIAN GROUP | Age: 33
End: 2023-10-10
Payer: COMMERCIAL

## 2023-10-10 ENCOUNTER — HOSPITAL ENCOUNTER (INPATIENT)
Facility: CLINIC | Age: 33
LOS: 1 days | Discharge: HOME | End: 2023-10-11
Attending: SURGERY | Admitting: SURGERY
Payer: COMMERCIAL

## 2023-10-10 ENCOUNTER — ANESTHESIA (OUTPATIENT)
Dept: SURGERY | Facility: CLINIC | Age: 33
End: 2023-10-10
Payer: COMMERCIAL

## 2023-10-10 ENCOUNTER — ANESTHESIA EVENT (OUTPATIENT)
Dept: SURGERY | Facility: CLINIC | Age: 33
End: 2023-10-10
Payer: COMMERCIAL

## 2023-10-10 DIAGNOSIS — Z98.84 HISTORY OF ROUX-EN-Y GASTRIC BYPASS: ICD-10-CM

## 2023-10-10 DIAGNOSIS — G89.18 POSTOPERATIVE PAIN: Primary | ICD-10-CM

## 2023-10-10 PROBLEM — E66.01 MORBID OBESITY WITH BMI OF 45.0-49.9, ADULT (H): Status: ACTIVE | Noted: 2023-10-10

## 2023-10-10 LAB
CREAT SERPL-MCNC: 0.99 MG/DL (ref 0.51–0.95)
EGFRCR SERPLBLD CKD-EPI 2021: 76 ML/MIN/1.73M2
HCG UR QL: NEGATIVE
HOLD SPECIMEN: NORMAL
LACTATE SERPL-SCNC: 1.7 MMOL/L (ref 0.7–2)

## 2023-10-10 PROCEDURE — 250N000011 HC RX IP 250 OP 636: Performed by: SURGERY

## 2023-10-10 PROCEDURE — 250N000009 HC RX 250: Performed by: PHYSICIAN ASSISTANT

## 2023-10-10 PROCEDURE — 250N000011 HC RX IP 250 OP 636: Mod: JZ | Performed by: NURSE ANESTHETIST, CERTIFIED REGISTERED

## 2023-10-10 PROCEDURE — 250N000011 HC RX IP 250 OP 636: Mod: JZ | Performed by: ANESTHESIOLOGY

## 2023-10-10 PROCEDURE — 43644 LAP GASTRIC BYPASS/ROUX-EN-Y: CPT | Performed by: SURGERY

## 2023-10-10 PROCEDURE — 258N000003 HC RX IP 258 OP 636: Performed by: PHYSICIAN ASSISTANT

## 2023-10-10 PROCEDURE — 250N000025 HC SEVOFLURANE, PER MIN: Performed by: SURGERY

## 2023-10-10 PROCEDURE — 258N000001 HC RX 258: Performed by: SURGERY

## 2023-10-10 PROCEDURE — 250N000009 HC RX 250: Performed by: NURSE ANESTHETIST, CERTIFIED REGISTERED

## 2023-10-10 PROCEDURE — 272N000001 HC OR GENERAL SUPPLY STERILE: Performed by: SURGERY

## 2023-10-10 PROCEDURE — 258N000003 HC RX IP 258 OP 636: Performed by: ANESTHESIOLOGY

## 2023-10-10 PROCEDURE — 360N000077 HC SURGERY LEVEL 4, PER MIN: Performed by: SURGERY

## 2023-10-10 PROCEDURE — 250N000011 HC RX IP 250 OP 636: Mod: JZ | Performed by: PHYSICIAN ASSISTANT

## 2023-10-10 PROCEDURE — 81025 URINE PREGNANCY TEST: CPT | Performed by: ANESTHESIOLOGY

## 2023-10-10 PROCEDURE — 250N000013 HC RX MED GY IP 250 OP 250 PS 637: Performed by: SURGERY

## 2023-10-10 PROCEDURE — 710N000009 HC RECOVERY PHASE 1, LEVEL 1, PER MIN: Performed by: SURGERY

## 2023-10-10 PROCEDURE — 120N000001 HC R&B MED SURG/OB

## 2023-10-10 PROCEDURE — 250N000011 HC RX IP 250 OP 636: Performed by: NURSE ANESTHETIST, CERTIFIED REGISTERED

## 2023-10-10 PROCEDURE — 82565 ASSAY OF CREATININE: CPT | Performed by: PHYSICIAN ASSISTANT

## 2023-10-10 PROCEDURE — 43644 LAP GASTRIC BYPASS/ROUX-EN-Y: CPT | Mod: AS | Performed by: PHYSICIAN ASSISTANT

## 2023-10-10 PROCEDURE — 36415 COLL VENOUS BLD VENIPUNCTURE: CPT | Performed by: SURGERY

## 2023-10-10 PROCEDURE — 0D164ZA BYPASS STOMACH TO JEJUNUM, PERCUTANEOUS ENDOSCOPIC APPROACH: ICD-10-PCS | Performed by: SURGERY

## 2023-10-10 PROCEDURE — 83605 ASSAY OF LACTIC ACID: CPT | Performed by: SURGERY

## 2023-10-10 PROCEDURE — 250N000009 HC RX 250: Performed by: SURGERY

## 2023-10-10 PROCEDURE — 258N000003 HC RX IP 258 OP 636: Performed by: NURSE ANESTHETIST, CERTIFIED REGISTERED

## 2023-10-10 PROCEDURE — 250N000013 HC RX MED GY IP 250 OP 250 PS 637: Performed by: PHYSICIAN ASSISTANT

## 2023-10-10 PROCEDURE — 370N000017 HC ANESTHESIA TECHNICAL FEE, PER MIN: Performed by: SURGERY

## 2023-10-10 PROCEDURE — 999N000141 HC STATISTIC PRE-PROCEDURE NURSING ASSESSMENT: Performed by: SURGERY

## 2023-10-10 RX ORDER — PROCHLORPERAZINE MALEATE 10 MG
10 TABLET ORAL EVERY 6 HOURS PRN
Status: DISCONTINUED | OUTPATIENT
Start: 2023-10-10 | End: 2023-10-11 | Stop reason: HOSPADM

## 2023-10-10 RX ORDER — CEFAZOLIN SODIUM/WATER 3 G/30 ML
3 SYRINGE (ML) INTRAVENOUS SEE ADMIN INSTRUCTIONS
Status: DISCONTINUED | OUTPATIENT
Start: 2023-10-10 | End: 2023-10-10 | Stop reason: HOSPADM

## 2023-10-10 RX ORDER — NALOXONE HYDROCHLORIDE 0.4 MG/ML
0.4 INJECTION, SOLUTION INTRAMUSCULAR; INTRAVENOUS; SUBCUTANEOUS
Status: DISCONTINUED | OUTPATIENT
Start: 2023-10-10 | End: 2023-10-11 | Stop reason: HOSPADM

## 2023-10-10 RX ORDER — FENTANYL CITRATE 50 UG/ML
INJECTION, SOLUTION INTRAMUSCULAR; INTRAVENOUS PRN
Status: DISCONTINUED | OUTPATIENT
Start: 2023-10-10 | End: 2023-10-10

## 2023-10-10 RX ORDER — ONDANSETRON 4 MG/1
4 TABLET, ORALLY DISINTEGRATING ORAL EVERY 6 HOURS PRN
Status: DISCONTINUED | OUTPATIENT
Start: 2023-10-10 | End: 2023-10-11 | Stop reason: HOSPADM

## 2023-10-10 RX ORDER — FENTANYL CITRATE 50 UG/ML
50 INJECTION, SOLUTION INTRAMUSCULAR; INTRAVENOUS EVERY 5 MIN PRN
Status: DISCONTINUED | OUTPATIENT
Start: 2023-10-10 | End: 2023-10-10 | Stop reason: HOSPADM

## 2023-10-10 RX ORDER — HYDROMORPHONE HCL IN WATER/PF 6 MG/30 ML
0.4 PATIENT CONTROLLED ANALGESIA SYRINGE INTRAVENOUS EVERY 5 MIN PRN
Status: DISCONTINUED | OUTPATIENT
Start: 2023-10-10 | End: 2023-10-10 | Stop reason: HOSPADM

## 2023-10-10 RX ORDER — HYDROMORPHONE HYDROCHLORIDE 1 MG/ML
INJECTION, SOLUTION INTRAMUSCULAR; INTRAVENOUS; SUBCUTANEOUS PRN
Status: DISCONTINUED | OUTPATIENT
Start: 2023-10-10 | End: 2023-10-10

## 2023-10-10 RX ORDER — NALOXONE HYDROCHLORIDE 0.4 MG/ML
0.2 INJECTION, SOLUTION INTRAMUSCULAR; INTRAVENOUS; SUBCUTANEOUS
Status: DISCONTINUED | OUTPATIENT
Start: 2023-10-10 | End: 2023-10-11 | Stop reason: HOSPADM

## 2023-10-10 RX ORDER — OXYCODONE HYDROCHLORIDE 5 MG/1
5 TABLET ORAL EVERY 4 HOURS PRN
Status: DISCONTINUED | OUTPATIENT
Start: 2023-10-10 | End: 2023-10-11 | Stop reason: HOSPADM

## 2023-10-10 RX ORDER — DEXMEDETOMIDINE HYDROCHLORIDE 4 UG/ML
INJECTION, SOLUTION INTRAVENOUS PRN
Status: DISCONTINUED | OUTPATIENT
Start: 2023-10-10 | End: 2023-10-10

## 2023-10-10 RX ORDER — LIDOCAINE 40 MG/G
CREAM TOPICAL
Status: DISCONTINUED | OUTPATIENT
Start: 2023-10-10 | End: 2023-10-11 | Stop reason: HOSPADM

## 2023-10-10 RX ORDER — LIDOCAINE HYDROCHLORIDE 20 MG/ML
INJECTION, SOLUTION INFILTRATION; PERINEURAL PRN
Status: DISCONTINUED | OUTPATIENT
Start: 2023-10-10 | End: 2023-10-10

## 2023-10-10 RX ORDER — FENTANYL CITRATE 50 UG/ML
25 INJECTION, SOLUTION INTRAMUSCULAR; INTRAVENOUS EVERY 5 MIN PRN
Status: DISCONTINUED | OUTPATIENT
Start: 2023-10-10 | End: 2023-10-10 | Stop reason: HOSPADM

## 2023-10-10 RX ORDER — KETOROLAC TROMETHAMINE 30 MG/ML
15 INJECTION, SOLUTION INTRAMUSCULAR; INTRAVENOUS EVERY 6 HOURS PRN
Status: CANCELLED | OUTPATIENT
Start: 2023-10-10 | End: 2023-10-15

## 2023-10-10 RX ORDER — CEFAZOLIN SODIUM/WATER 3 G/30 ML
3 SYRINGE (ML) INTRAVENOUS
Status: COMPLETED | OUTPATIENT
Start: 2023-10-10 | End: 2023-10-10

## 2023-10-10 RX ORDER — SIMETHICONE 40MG/0.6ML
40 SUSPENSION, DROPS(FINAL DOSAGE FORM)(ML) ORAL 4 TIMES DAILY
Status: DISCONTINUED | OUTPATIENT
Start: 2023-10-10 | End: 2023-10-11 | Stop reason: HOSPADM

## 2023-10-10 RX ORDER — CYCLOBENZAPRINE HCL 5 MG
5 TABLET ORAL 3 TIMES DAILY
Qty: 15 TABLET | Refills: 0 | Status: SHIPPED | OUTPATIENT
Start: 2023-10-10 | End: 2023-10-15

## 2023-10-10 RX ORDER — OXYCODONE HYDROCHLORIDE 5 MG/1
10 TABLET ORAL EVERY 4 HOURS PRN
Status: DISCONTINUED | OUTPATIENT
Start: 2023-10-10 | End: 2023-10-11 | Stop reason: HOSPADM

## 2023-10-10 RX ORDER — SODIUM CHLORIDE, SODIUM LACTATE, POTASSIUM CHLORIDE, CALCIUM CHLORIDE 600; 310; 30; 20 MG/100ML; MG/100ML; MG/100ML; MG/100ML
INJECTION, SOLUTION INTRAVENOUS CONTINUOUS
Status: DISCONTINUED | OUTPATIENT
Start: 2023-10-10 | End: 2023-10-11 | Stop reason: HOSPADM

## 2023-10-10 RX ORDER — OXYCODONE HYDROCHLORIDE 5 MG/1
5 TABLET ORAL EVERY 4 HOURS PRN
Qty: 12 TABLET | Refills: 0 | Status: SHIPPED | OUTPATIENT
Start: 2023-10-10 | End: 2023-10-13

## 2023-10-10 RX ORDER — ACETAMINOPHEN 325 MG/1
975 TABLET ORAL EVERY 6 HOURS
Status: DISCONTINUED | OUTPATIENT
Start: 2023-10-10 | End: 2023-10-11 | Stop reason: HOSPADM

## 2023-10-10 RX ORDER — PROPOFOL 10 MG/ML
INJECTION, EMULSION INTRAVENOUS CONTINUOUS PRN
Status: DISCONTINUED | OUTPATIENT
Start: 2023-10-10 | End: 2023-10-10

## 2023-10-10 RX ORDER — LABETALOL HYDROCHLORIDE 5 MG/ML
5-10 INJECTION, SOLUTION INTRAVENOUS
Status: DISCONTINUED | OUTPATIENT
Start: 2023-10-10 | End: 2023-10-11 | Stop reason: HOSPADM

## 2023-10-10 RX ORDER — SODIUM CHLORIDE, SODIUM LACTATE, POTASSIUM CHLORIDE, CALCIUM CHLORIDE 600; 310; 30; 20 MG/100ML; MG/100ML; MG/100ML; MG/100ML
INJECTION, SOLUTION INTRAVENOUS CONTINUOUS
Status: DISCONTINUED | OUTPATIENT
Start: 2023-10-10 | End: 2023-10-10 | Stop reason: HOSPADM

## 2023-10-10 RX ORDER — BUPIVACAINE HYDROCHLORIDE 2.5 MG/ML
INJECTION, SOLUTION INFILTRATION; PERINEURAL PRN
Status: DISCONTINUED | OUTPATIENT
Start: 2023-10-10 | End: 2023-10-10 | Stop reason: HOSPADM

## 2023-10-10 RX ORDER — SIMETHICONE 40MG/0.6ML
40 SUSPENSION, DROPS(FINAL DOSAGE FORM)(ML) ORAL 4 TIMES DAILY
Qty: 12 ML | Refills: 0 | Status: SHIPPED | OUTPATIENT
Start: 2023-10-10 | End: 2023-10-15

## 2023-10-10 RX ORDER — HEPARIN SODIUM 5000 [USP'U]/.5ML
5000 INJECTION, SOLUTION INTRAVENOUS; SUBCUTANEOUS
Status: COMPLETED | OUTPATIENT
Start: 2023-10-10 | End: 2023-10-10

## 2023-10-10 RX ORDER — DEXAMETHASONE SODIUM PHOSPHATE 4 MG/ML
INJECTION, SOLUTION INTRA-ARTICULAR; INTRALESIONAL; INTRAMUSCULAR; INTRAVENOUS; SOFT TISSUE PRN
Status: DISCONTINUED | OUTPATIENT
Start: 2023-10-10 | End: 2023-10-10

## 2023-10-10 RX ORDER — ONDANSETRON 4 MG/1
4 TABLET, ORALLY DISINTEGRATING ORAL EVERY 6 HOURS PRN
Qty: 12 TABLET | Refills: 0 | Status: SHIPPED | OUTPATIENT
Start: 2023-10-10 | End: 2023-10-17

## 2023-10-10 RX ORDER — HYDROMORPHONE HCL IN WATER/PF 6 MG/30 ML
0.2 PATIENT CONTROLLED ANALGESIA SYRINGE INTRAVENOUS
Status: DISCONTINUED | OUTPATIENT
Start: 2023-10-10 | End: 2023-10-11 | Stop reason: HOSPADM

## 2023-10-10 RX ORDER — PROPOFOL 10 MG/ML
INJECTION, EMULSION INTRAVENOUS PRN
Status: DISCONTINUED | OUTPATIENT
Start: 2023-10-10 | End: 2023-10-10

## 2023-10-10 RX ORDER — SCOLOPAMINE TRANSDERMAL SYSTEM 1 MG/1
1 PATCH, EXTENDED RELEASE TRANSDERMAL
Status: DISCONTINUED | OUTPATIENT
Start: 2023-10-10 | End: 2023-10-11 | Stop reason: HOSPADM

## 2023-10-10 RX ORDER — HYDROMORPHONE HCL IN WATER/PF 6 MG/30 ML
0.2 PATIENT CONTROLLED ANALGESIA SYRINGE INTRAVENOUS EVERY 5 MIN PRN
Status: DISCONTINUED | OUTPATIENT
Start: 2023-10-10 | End: 2023-10-10 | Stop reason: HOSPADM

## 2023-10-10 RX ORDER — OMEPRAZOLE 20 MG/1
20 TABLET, DELAYED RELEASE ORAL
Qty: 90 TABLET | Refills: 0 | Status: SHIPPED | OUTPATIENT
Start: 2023-10-10 | End: 2024-01-22

## 2023-10-10 RX ORDER — MAGNESIUM HYDROXIDE 1200 MG/15ML
LIQUID ORAL PRN
Status: DISCONTINUED | OUTPATIENT
Start: 2023-10-10 | End: 2023-10-10 | Stop reason: HOSPADM

## 2023-10-10 RX ORDER — CYCLOBENZAPRINE HCL 10 MG
10 TABLET ORAL 3 TIMES DAILY
Status: DISCONTINUED | OUTPATIENT
Start: 2023-10-10 | End: 2023-10-11 | Stop reason: HOSPADM

## 2023-10-10 RX ORDER — DIPHENHYDRAMINE HYDROCHLORIDE 50 MG/ML
25 INJECTION INTRAMUSCULAR; INTRAVENOUS EVERY 6 HOURS PRN
Status: DISCONTINUED | OUTPATIENT
Start: 2023-10-10 | End: 2023-10-11 | Stop reason: HOSPADM

## 2023-10-10 RX ORDER — ONDANSETRON 2 MG/ML
4 INJECTION INTRAMUSCULAR; INTRAVENOUS EVERY 30 MIN PRN
Status: DISCONTINUED | OUTPATIENT
Start: 2023-10-10 | End: 2023-10-10 | Stop reason: HOSPADM

## 2023-10-10 RX ORDER — ONDANSETRON 4 MG/1
4 TABLET, ORALLY DISINTEGRATING ORAL EVERY 30 MIN PRN
Status: DISCONTINUED | OUTPATIENT
Start: 2023-10-10 | End: 2023-10-10 | Stop reason: HOSPADM

## 2023-10-10 RX ORDER — ONDANSETRON 2 MG/ML
4 INJECTION INTRAMUSCULAR; INTRAVENOUS EVERY 6 HOURS PRN
Status: DISCONTINUED | OUTPATIENT
Start: 2023-10-10 | End: 2023-10-11 | Stop reason: HOSPADM

## 2023-10-10 RX ORDER — ACETAMINOPHEN 325 MG/1
975 TABLET ORAL ONCE
Status: COMPLETED | OUTPATIENT
Start: 2023-10-10 | End: 2023-10-10

## 2023-10-10 RX ORDER — DIPHENHYDRAMINE HCL 25 MG
25 CAPSULE ORAL EVERY 6 HOURS PRN
Status: DISCONTINUED | OUTPATIENT
Start: 2023-10-10 | End: 2023-10-11 | Stop reason: HOSPADM

## 2023-10-10 RX ORDER — LIDOCAINE 40 MG/G
CREAM TOPICAL
Status: DISCONTINUED | OUTPATIENT
Start: 2023-10-10 | End: 2023-10-10 | Stop reason: HOSPADM

## 2023-10-10 RX ORDER — DESOGESTREL AND ETHINYL ESTRADIOL 0.15-0.03
1 KIT ORAL DAILY
Status: DISCONTINUED | OUTPATIENT
Start: 2023-10-11 | End: 2023-10-11 | Stop reason: HOSPADM

## 2023-10-10 RX ORDER — DIPHENHYDRAMINE HYDROCHLORIDE 50 MG/ML
25 INJECTION INTRAMUSCULAR; INTRAVENOUS ONCE
Status: COMPLETED | OUTPATIENT
Start: 2023-10-10 | End: 2023-10-10

## 2023-10-10 RX ORDER — ONDANSETRON 2 MG/ML
INJECTION INTRAMUSCULAR; INTRAVENOUS PRN
Status: DISCONTINUED | OUTPATIENT
Start: 2023-10-10 | End: 2023-10-10

## 2023-10-10 RX ORDER — ONDANSETRON 2 MG/ML
4 INJECTION INTRAMUSCULAR; INTRAVENOUS
Status: COMPLETED | OUTPATIENT
Start: 2023-10-10 | End: 2023-10-10

## 2023-10-10 RX ORDER — ENALAPRILAT 1.25 MG/ML
1.25 INJECTION INTRAVENOUS EVERY 6 HOURS PRN
Status: DISCONTINUED | OUTPATIENT
Start: 2023-10-10 | End: 2023-10-11 | Stop reason: HOSPADM

## 2023-10-10 RX ORDER — ENOXAPARIN SODIUM 100 MG/ML
40 INJECTION SUBCUTANEOUS EVERY 12 HOURS
Status: DISCONTINUED | OUTPATIENT
Start: 2023-10-11 | End: 2023-10-11 | Stop reason: HOSPADM

## 2023-10-10 RX ADMIN — HYOSCYAMINE SULFATE 125 MCG: 0.12 TABLET SUBLINGUAL at 23:56

## 2023-10-10 RX ADMIN — SCOPALAMINE 1 PATCH: 1 PATCH, EXTENDED RELEASE TRANSDERMAL at 20:13

## 2023-10-10 RX ADMIN — FENTANYL CITRATE 50 MCG: 50 INJECTION INTRAMUSCULAR; INTRAVENOUS at 16:23

## 2023-10-10 RX ADMIN — Medication 3 G: at 13:27

## 2023-10-10 RX ADMIN — HEPARIN SODIUM 5000 UNITS: 5000 INJECTION, SOLUTION INTRAVENOUS; SUBCUTANEOUS at 13:08

## 2023-10-10 RX ADMIN — FENTANYL CITRATE 50 MCG: 50 INJECTION, SOLUTION INTRAMUSCULAR; INTRAVENOUS at 17:05

## 2023-10-10 RX ADMIN — FAMOTIDINE 20 MG: 10 INJECTION INTRAVENOUS at 20:06

## 2023-10-10 RX ADMIN — SODIUM CHLORIDE, POTASSIUM CHLORIDE, SODIUM LACTATE AND CALCIUM CHLORIDE: 600; 310; 30; 20 INJECTION, SOLUTION INTRAVENOUS at 19:30

## 2023-10-10 RX ADMIN — FAMOTIDINE 20 MG: 10 INJECTION INTRAVENOUS at 13:10

## 2023-10-10 RX ADMIN — DEXAMETHASONE SODIUM PHOSPHATE 4 MG: 4 INJECTION, SOLUTION INTRA-ARTICULAR; INTRALESIONAL; INTRAMUSCULAR; INTRAVENOUS; SOFT TISSUE at 13:37

## 2023-10-10 RX ADMIN — SUGAMMADEX 300 MG: 100 INJECTION, SOLUTION INTRAVENOUS at 16:18

## 2023-10-10 RX ADMIN — ONDANSETRON 4 MG: 2 INJECTION INTRAMUSCULAR; INTRAVENOUS at 13:15

## 2023-10-10 RX ADMIN — SODIUM CHLORIDE, POTASSIUM CHLORIDE, SODIUM LACTATE AND CALCIUM CHLORIDE: 600; 310; 30; 20 INJECTION, SOLUTION INTRAVENOUS at 13:17

## 2023-10-10 RX ADMIN — HYDROMORPHONE HYDROCHLORIDE 0.2 MG: 0.2 INJECTION, SOLUTION INTRAMUSCULAR; INTRAVENOUS; SUBCUTANEOUS at 20:05

## 2023-10-10 RX ADMIN — DEXMEDETOMIDINE HYDROCHLORIDE 8 MCG: 200 INJECTION INTRAVENOUS at 14:35

## 2023-10-10 RX ADMIN — ACETAMINOPHEN 975 MG: 325 TABLET, FILM COATED ORAL at 11:21

## 2023-10-10 RX ADMIN — FENTANYL CITRATE 100 MCG: 50 INJECTION INTRAMUSCULAR; INTRAVENOUS at 13:32

## 2023-10-10 RX ADMIN — PHENYLEPHRINE HYDROCHLORIDE 100 MCG: 10 INJECTION INTRAVENOUS at 14:02

## 2023-10-10 RX ADMIN — ONDANSETRON 4 MG: 2 INJECTION INTRAMUSCULAR; INTRAVENOUS at 17:01

## 2023-10-10 RX ADMIN — SODIUM CHLORIDE, POTASSIUM CHLORIDE, SODIUM LACTATE AND CALCIUM CHLORIDE: 600; 310; 30; 20 INJECTION, SOLUTION INTRAVENOUS at 14:19

## 2023-10-10 RX ADMIN — FENTANYL CITRATE 50 MCG: 50 INJECTION, SOLUTION INTRAMUSCULAR; INTRAVENOUS at 17:16

## 2023-10-10 RX ADMIN — PROPOFOL 200 MG: 10 INJECTION, EMULSION INTRAVENOUS at 13:32

## 2023-10-10 RX ADMIN — ROCURONIUM BROMIDE 60 MG: 50 INJECTION, SOLUTION INTRAVENOUS at 13:32

## 2023-10-10 RX ADMIN — DIPHENHYDRAMINE HYDROCHLORIDE 25 MG: 50 INJECTION, SOLUTION INTRAMUSCULAR; INTRAVENOUS at 18:45

## 2023-10-10 RX ADMIN — PROPOFOL 30 MCG/KG/MIN: 10 INJECTION, EMULSION INTRAVENOUS at 13:36

## 2023-10-10 RX ADMIN — HYDROMORPHONE HYDROCHLORIDE 0.5 MG: 1 INJECTION, SOLUTION INTRAMUSCULAR; INTRAVENOUS; SUBCUTANEOUS at 14:14

## 2023-10-10 RX ADMIN — SIMETHICONE 40 MG: 20 EMULSION ORAL at 20:16

## 2023-10-10 RX ADMIN — LIDOCAINE HYDROCHLORIDE 60 MG: 20 INJECTION, SOLUTION INFILTRATION; PERINEURAL at 13:32

## 2023-10-10 RX ADMIN — ROCURONIUM BROMIDE 10 MG: 50 INJECTION, SOLUTION INTRAVENOUS at 14:10

## 2023-10-10 RX ADMIN — SODIUM CHLORIDE, POTASSIUM CHLORIDE, SODIUM LACTATE AND CALCIUM CHLORIDE: 600; 310; 30; 20 INJECTION, SOLUTION INTRAVENOUS at 23:55

## 2023-10-10 RX ADMIN — PHENYLEPHRINE HYDROCHLORIDE 100 MCG: 10 INJECTION INTRAVENOUS at 14:09

## 2023-10-10 RX ADMIN — HYOSCYAMINE SULFATE 125 MCG: 0.12 TABLET SUBLINGUAL at 20:25

## 2023-10-10 RX ADMIN — MIDAZOLAM 2 MG: 1 INJECTION INTRAMUSCULAR; INTRAVENOUS at 13:25

## 2023-10-10 RX ADMIN — HYDROMORPHONE HYDROCHLORIDE 0.4 MG: 0.2 INJECTION, SOLUTION INTRAMUSCULAR; INTRAVENOUS; SUBCUTANEOUS at 17:53

## 2023-10-10 RX ADMIN — DEXMEDETOMIDINE HYDROCHLORIDE 12 MCG: 200 INJECTION INTRAVENOUS at 14:29

## 2023-10-10 RX ADMIN — HYDROMORPHONE HYDROCHLORIDE 0.4 MG: 0.2 INJECTION, SOLUTION INTRAMUSCULAR; INTRAVENOUS; SUBCUTANEOUS at 17:40

## 2023-10-10 RX ADMIN — ONDANSETRON 4 MG: 2 INJECTION INTRAMUSCULAR; INTRAVENOUS at 16:01

## 2023-10-10 RX ADMIN — PHENYLEPHRINE HYDROCHLORIDE 100 MCG: 10 INJECTION INTRAVENOUS at 14:12

## 2023-10-10 RX ADMIN — HYDROMORPHONE HYDROCHLORIDE 0.4 MG: 0.2 INJECTION, SOLUTION INTRAMUSCULAR; INTRAVENOUS; SUBCUTANEOUS at 18:07

## 2023-10-10 RX ADMIN — PHENYLEPHRINE HYDROCHLORIDE 100 MCG: 10 INJECTION INTRAVENOUS at 15:43

## 2023-10-10 RX ADMIN — HYDROMORPHONE HYDROCHLORIDE 0.4 MG: 0.2 INJECTION, SOLUTION INTRAMUSCULAR; INTRAVENOUS; SUBCUTANEOUS at 17:28

## 2023-10-10 ASSESSMENT — ACTIVITIES OF DAILY LIVING (ADL)
ADLS_ACUITY_SCORE: 18
DOING_ERRANDS_INDEPENDENTLY_DIFFICULTY: NO
CONCENTRATING,_REMEMBERING_OR_MAKING_DECISIONS_DIFFICULTY: NO
CHANGE_IN_FUNCTIONAL_STATUS_SINCE_ONSET_OF_CURRENT_ILLNESS/INJURY: NO
ADLS_ACUITY_SCORE: 18
ADLS_ACUITY_SCORE: 18
ADLS_ACUITY_SCORE: 35
TOILETING_ISSUES: NO
WALKING_OR_CLIMBING_STAIRS_DIFFICULTY: NO
DIFFICULTY_EATING/SWALLOWING: NO
FALL_HISTORY_WITHIN_LAST_SIX_MONTHS: NO
DRESSING/BATHING_DIFFICULTY: NO
WEAR_GLASSES_OR_BLIND: NO
ADLS_ACUITY_SCORE: 18

## 2023-10-10 ASSESSMENT — COPD QUESTIONNAIRES: COPD: 0

## 2023-10-10 ASSESSMENT — LIFESTYLE VARIABLES: TOBACCO_USE: 1

## 2023-10-10 NOTE — ANESTHESIA PREPROCEDURE EVALUATION
Anesthesia Pre-Procedure Evaluation    Patient: Siddharth Fontanez   MRN: [id number] : 1990        Procedure : Procedure(s):  Laparoscopic Gastric Bypass          Past Medical History:   Diagnosis Date    Irregular periods     Migraine 08/10/2022    Papanicolaou smear of cervix with low grade squamous intraepithelial lesion (LGSIL) 2022      Past Surgical History:   Procedure Laterality Date    TONSILLECTOMY      approx    TONSILLECTOMY        No Known Allergies   Social History     Tobacco Use    Smoking status: Every Day     Types: Hookah    Smokeless tobacco: Never   Substance Use Topics    Alcohol use: No      Wt Readings from Last 1 Encounters:   23 132.2 kg (291 lb 6.4 oz)        Anesthesia Evaluation            ROS/MED HX  ENT/Pulmonary:     (+)                tobacco use, Past use,                   (-) asthma, COPD and sleep apnea   Neurologic:     (+)      migraines,                          Cardiovascular:       METS/Exercise Tolerance:     Hematologic:       Musculoskeletal:       GI/Hepatic:    (-) GERD and liver disease   Renal/Genitourinary:    (-) renal disease   Endo:     (+)               Obesity,       Psychiatric/Substance Use:       Infectious Disease:       Malignancy:       Other:            Physical Exam    Airway        Mallampati: III   TM distance: > 3 FB   Neck ROM: full     Respiratory Devices and Support         Dental       (+) Minor Abnormalities - some fillings, tiny chips      Cardiovascular   cardiovascular exam normal          Pulmonary   pulmonary exam normal                OUTSIDE LABS:  CBC:   Lab Results   Component Value Date    WBC 10.1 2023    WBC 13.4 (H) 02/10/2020    HGB 12.4 2023    HGB 9.0 (L) 02/10/2020    HCT 39.9 2023    HCT 30.5 (L) 02/10/2020     2023     (H) 02/10/2020     BMP:   Lab Results   Component Value Date     2023     02/10/2020    POTASSIUM 3.9 2023    POTASSIUM 3.2 (L)  02/10/2020    CHLORIDE 102 01/27/2023    CHLORIDE 107 02/10/2020    CO2 29 01/27/2023    CO2 29 02/10/2020    BUN 9 01/27/2023    BUN 10 02/10/2020    CR 0.80 01/27/2023    CR 0.90 02/10/2020     (H) 01/27/2023     (H) 02/10/2020     COAGS:   Lab Results   Component Value Date    INR 1.12 02/10/2020     POC:   Lab Results   Component Value Date    BGM 93 10/31/2018    HCG Negative 03/08/2023    HCGS Negative 08/09/2018     HEPATIC:   Lab Results   Component Value Date    ALBUMIN 4.1 04/02/2023    PROTTOTAL 8.6 (H) 04/02/2023     (H) 04/02/2023    AST 72 (H) 04/02/2023    ALKPHOS 121 (H) 04/02/2023    BILITOTAL 0.3 04/02/2023     OTHER:   Lab Results   Component Value Date    A1C 5.8 (H) 04/02/2023    TATE 8.8 01/27/2023    TSH 2.44 10/31/2018    T4 1.01 08/09/2018       Anesthesia Plan    ASA Status:  3       Anesthesia Type: General.     - Airway: ETT         Techniques and Equipment:     - Airway: Video-Laryngoscope       Consents    Anesthesia Plan(s) and associated risks, benefits, and realistic alternatives discussed. Questions answered and patient/representative(s) expressed understanding.     - Discussed:     - Discussed with:  Patient            Postoperative Care       PONV prophylaxis: Ondansetron (or other 5HT-3), Dexamethasone or Solumedrol, Background Propofol Infusion     Comments:                Meera Dia

## 2023-10-10 NOTE — ANESTHESIA CARE TRANSFER NOTE
Patient: Siddharth Fontanez    Procedure: Procedure(s):  Laparoscopic Amairani-en-y Gastric Bypass       Diagnosis: Morbid obesity (H) [E66.01]  Diagnosis Additional Information: No value filed.    Anesthesia Type:   General     Note:    Oropharynx: oropharynx clear of all foreign objects and spontaneously breathing  Level of Consciousness: drowsy  Oxygen Supplementation: face mask  Level of Supplemental Oxygen (L/min / FiO2): 8  Independent Airway: airway patency satisfactory and stable  Dentition: dentition unchanged  Vital Signs Stable: post-procedure vital signs reviewed and stable  Report to RN Given: handoff report given  Patient transferred to: PACU    Handoff Report: Identifed the Patient, Identified the Reponsible Provider, Reviewed the pertinent medical history, Discussed the surgical course, Reviewed Intra-OP anesthesia mangement and issues during anesthesia, Set expectations for post-procedure period and Allowed opportunity for questions and acknowledgement of understanding      Vitals:  Vitals Value Taken Time   /83    Temp     Pulse 92    Resp 18    SpO2 100        Electronically Signed By: GALLO Cohen [facility]  October 10, 2023  4:49 PM

## 2023-10-10 NOTE — BRIEF OP NOTE
[facility]    Brief Operative Note    Pre-operative diagnosis: Morbid obesity (H) [E66.01]  Post-operative diagnosis Same as pre-operative diagnosis    Procedure: Laparoscopic Amairani-en-y Gastric Bypass, N/A - Abdomen    Surgeon: Surgeon(s) and Role:     * Alvaro Zayas MD - Primary     * Simran Bond PA-C - Assisting  Anesthesia: General   Estimated Blood Loss: 25 mL from 10/10/2023  1:25 PM to 10/10/2023  4:44 PM  Drains: None  Specimens: * No specimens in log *  Findings:   Gastric anatomy as expected. Amairani en y gastric bypass performed. Gallbladder without abnormality .  Complications: None.  Implants: * No implants in log *

## 2023-10-10 NOTE — ANESTHESIA POSTPROCEDURE EVALUATION
Patient: Siddharth Fontanez    Procedure: Procedure(s):  Laparoscopic Amairani-en-y Gastric Bypass       Anesthesia Type:  General    Note:     Postop Pain Control: Uneventful            Sign Out: Well controlled pain   PONV: No   Neuro/Psych: Uneventful            Sign Out: Acceptable/Baseline neuro status   Airway/Respiratory: Uneventful            Sign Out: Acceptable/Baseline resp. status   CV/Hemodynamics: Uneventful            Sign Out: Acceptable CV status; No obvious hypovolemia; No obvious fluid overload   Other NRE:    DID A NON-ROUTINE EVENT OCCUR? No           Last vitals:  Vitals Value Taken Time   /86 10/10/23 1715   Temp     Pulse 92 10/10/23 1729   Resp 20 10/10/23 1729   SpO2 99 % 10/10/23 1729   Vitals shown include unvalidated device data.    Electronically Signed By: Skye Kwan MD, MD  October 10, 2023  5:30 PM

## 2023-10-10 NOTE — ANESTHESIA PROCEDURE NOTES
Airway       Patient location during procedure: OR       Procedure Start/Stop Times: 10/10/2023 1:34 PM  Staff -        CRNA: Heber Lainez APRN CRNA       Performed By: CRNA  Consent for Airway        Urgency: elective  Indications and Patient Condition       Indications for airway management: aleks-procedural       Induction type:intravenous       Mask difficulty assessment: 1 - vent by mask    Final Airway Details       Final airway type: endotracheal airway       Successful airway: ETT - single  Endotracheal Airway Details        ETT size (mm): 7.0       Cuffed: yes       Successful intubation technique: video laryngoscopy       VL Blade Size: Dejesus 3       Grade View of Cords: 1       Adjucts: stylet       Position: Right       Measured from: gums/teeth       Secured at (cm): 21       Bite block used: None    Post intubation assessment        Placement verified by: capnometry, equal breath sounds and chest rise        Number of attempts at approach: 1       Number of other approaches attempted: 0       Secured with: silk tape       Ease of procedure: easy       Dentition: Intact and Unchanged    Medication(s) Administered   Medication Administration Time: 10/10/2023 1:34 PM

## 2023-10-10 NOTE — PROGRESS NOTES
[facility]    General Surgery  Short Progress Note      Siddharth Fontanez underwent laparoscopic Amairani en Y gastric bypass. Okay to start bariatric clear liquid diet once alert, oriented, and able to sit up. Continue IVF's until able to take in 500 ml and possible discharge as early as tomorrow. Diet advancement to full liquid diet will start at home.     Diet instructions:  - Only bariatric clear liquid trays may be ordered.   - Okay for oral medications when tolerating clears.   - Med cups are a necessity.   - Patient is to take sips of 30 mls.  (1 ounce) every 30 minutes for the first 4 hours (kind of like a 30-30 rule for the first 4 hours).  - Take a sip (not slurp or will get air in) - usually get a burp - then take another sip.   - After the 4 hours, patients can advance drinking as tolerated with goal of 64 ounces daily. (Probably won t get there for a couple of days but that is the overall goal.)      Simran Bond PA-C

## 2023-10-10 NOTE — DISCHARGE INSTRUCTIONS
After Bariatric Surgery Discharge Instructions  [facility]     Note: Ask your nurse to order your medications from the pharmacy. Be sure you have your medications with you when you leave.    What should my diet be for the first 2 weeks after surgery?  Follow the bariatric diet the dietitian discussed with you.    How much fluid should I drink?  Strive for 48-64 ounces daily.  Carry a water bottle with you without a straw or sports top. Drink from it often.  Keep track of how much fluid you drink in a day.  Remember:  -Do not use straws, chew gum or suck on hard candies. They may cause painful gas.    -Sip, don't slurp when you drink.    -Practice small sips using a medicine cup for the first week postop.   -No ice or cold drinks. This could cause gas or spasms.   -No coffee, soda pop or drinks with caffeine. These may cause stomach pain.   -No alcohol. It is bad for your liver and will cause stomach pain.    How often should I do my deep breathing and coughing?  Use your incentive spirometer (small plastic breathing device) every hour while awake after you get home. Using the incentive spirometer helps you deep breath. Continuing to cough and deep breath will help prevent fevers and pneumonia.   If you do not have a fever after one week, you can stop using the incentive spirometer and discard it.     You can continue to take deep breaths without the incentive spirometer every one to two hours while awake for the month after surgery.    What kinds of activity can I do?  Get plenty of rest the first few days after surgery and try to balance rest and activity. You will need some time to recover - you may be more tired than you realize at first. You'll feel better and heal faster if you take good care of yourself.  For 4 weeks after surgery (Please review restrictions at your one week visit, they could change based on how well you are doing):  -Don't lift more than 20 pounds.    -Take 4-5 short walks every day.  -Don't jog, run, or do belly exercises.  Don't swim, bathe or use a hot tub until your cuts are healed (scabs are gone).  You may shower  Don't plan to fly or take a road trip within the first 1 to 3 months after surgery.  You could get a blood clot in your legs. If you must travel, get up and move around every hour for at least 5 minutes before continuing on your journey.  Your care team can help you decide when it's safe for you to travel.     What can I do for pain control?  You had major belly surgery that involves all layers of your belly muscles. Pain is expected, even for some as far out as 6-8 weeks after surgery. Moving, sneezing, coughing, and breathing will cause discomfort because these activities use your belly muscles.   Please see your after visit summary medication review for what pain medication will be continued, discontinued and newly started for you.  You should take your muscle relaxant (flexeril), anti-spasm medication (levsin), and anti-gas medication (simethicone) around the clock for the first 3-5 days.    You can take opioid pain medicine if prescribed and if needed. Try to wean off from it as soon as you feel comfortable.   Do not drive while you are taking opioid pain medicine. This is dangerous.  You should also take acetaminophen (Tylenol) between your prescribed pain medicine on a scheduled basis OR take it scheduled every 6 hours (check with your care team for specifics).  -Acetaminophen formulation options:    -Liquid   -Caplet (Cut caplet in half before taking)   -Do not take more than 3000 mg Tylenol in a 24 hour period.  -You may also take Tylenol for pain in place of the opioid pain medicine (check with your care team for specifics).   You can apply ice or heat to the affected area(s). Just remember to wrap the ice in something and limit icing sessions to 20 minutes. Excessive icing can irritate the skin or cause skin damage.  You can apply  heat with a hot, wet towel or heating pad. Just like cold therapy, limit heat application to 20 minutes. Never sleep with a heating pad on. It could cause severe burns to your skin.  Wear your binder to support your belly muscles if you have one.  Take this off a little more each day and try to be off completely by 2 weeks after surgery. If you don't need your binder for comfort or support, you don't need to wear it.   You may not be able to sleep in a comfortable position for a few weeks after surgery. This is normal. You may be more comfortable sleeping in a recliner or propped up with 3 pillows for the first couple of weeks after surgery.  Do not take NSAID's (Non-Steroidal Anti-Inflammatory Drugs) (examples: ibuprofen, Motrin, Advil, Aleve, and Naproxen), aspirin, or use pain patches with NSAID's. They will increase your risk of bleeding or getting an ulcer.    Please call the clinic for any of the following pain concerns, we would like to talk to you:  -pain that does not improve with rest  -pain that gets worse and worse  -pain that is not controlled by your pain medicine  -a sudden severe increase in pain    What medications will I need to take after surgery?  You may be discharged with the following types of medications: antacids, pain medications, anti-nausea medications, etc.  Please see your after visit summary medication review for what will be continued, discontinued and newly started.  It is important to reduce the amount of acid in your new stomach for a couple of months after surgery while it is still healing. We will prescribe an acid reducer (omeprazole). Take it as directed. This will help prevent ulcers, heartburn and acid reflux.  If you took an acid reducer before you had surgery, your care team will let you know what acid reducer you will take after surgery.   It is okay to swallow any medications smaller than   inch in size.   -If it is larger than   inch, it may need to be cut, crushed, or  in a liquid form. Check with your care team about which way is most appropriate for you and your medications.    What should I know about my incisions (cuts)?  Your incisions are covered with white steri strips or butterfly tape and have band aids or gauze over the top. If you have gauze or band aids, they can come off in the hospital.  Leave your steri strips on until they fall off on their own. If the steri strips don't fall off after 1 to 2 weeks, you can take them off. If they fall off earlier, replace them with clean band aids trying to avoid touching the incision itself.   If you have gauze covered by a clear dressing, remove 2-3 days after surgery or as directed by your care team.  You may shower in the hospital after surgery and can get your incision coverings wet.  Do not submerge in water (e.g. No baths, swimming pools, hot tubs) until your care team tells you it is okay and your incisions are completely healed.    Call the clinic if you have any of these signs or symptoms of infection:  -Redness around the site.  -Drainage that smells bad.  -Drainage that is thick yellow or green.  -An increase in pain around the incision site  -An increase in swelling around the incision site  -Heat or warmth around incision site   -Fever of 101.5  F (38.3  C) or higher when taken under the tongue.    -Chills    Will my urine or bowel movements change?   Your first bowel movements (stools) will likely be liquid. You may also notice old blood or a darker color (black or maroon color) in your bowel movements.  This is not unusual and usually goes away after the first week, if not sooner. You may not have a bowel movement for a week.   If you have not had a bowel movement for at least three days after your surgery date and are passing gas, you can use over the counter stool softeners.  Please stop taking the stool softeners and laxatives if your stools are loose.  Increasing fluids and activity as well as getting off  "narcotics will help prevent constipation.    Call the clinic if:   -You have stomach pain.   -You continue to have constipation.  -You have excessive bloating after walking and passing gas.    How can I prevent dehydration if I feel nauseated (sick to my stomach) and vomit (throw up)?   Vomiting is not normal after surgery. If you continue to have nausea and vomiting, call the clinic.   Nausea can be a sign of dehydration. That is why it is very important to track your fluids.  Do not nap more than one hour during the day. Set a timer to wake yourself up, if needed. Too much sleep will keep you from drinking enough fluid during the day and lead to dehydration.  No outside activity in hot, humid weather until you can drink 48 to 64 ounces of fluid in 24 hours. If you sweat a lot, your body may lose too much water.  Try to take a 1 ounce sip of water (one medicine cup) every 15 minutes.  Set a timer to remind yourself.    Call the clinic if you have any of these signs or symptoms of dehydration:  -Dark colored urine  -Urinating (pass water) less than 2-3 times per day  -Lack of energy  -Nausea  -Dizziness  -Headache    Call the clinic ANY TIME at [phone] if:  -Your pain medicine is not working.  -You have a fever ? 101.5 F.  -You have belly or left shoulder pain that gets worse and worse.  -You have a swollen leg with redness, warmth, or pain behind the knee or calf.  -You have chest pain   -You feel very short of breath.  -You have a sudden severe increase in heart rate.  -You have vomiting that gets worse and worse.  -You have constant nausea (feeling sick to your stomach) that does not go away with medication.  -You have trouble swallowing.  -You have an increasing feeling that \"something is not right\".  -You have hiccups that do not stop.  -You have any questions or concerns.    If you have to go to the Emergency Room, we prefer you go to the hospital that did your surgery. Please let them know that you had " bariatric surgery and to notify your surgeon.    When should I go back to the clinic?  Follow up with your care team in 1-2 weeks.   If this appointment was not already made, please call: [phone]    The next phase of your diet will start the day after you get home from the hospital or postop day #2.  Please stay on this diet until your Care Team tells you to advance.   Please call if you have questions: [phone].      Your Stage 2 Diet: Low-fat Full Liquids    Full liquids are a little thicker than clear liquids but can still be poured. You can't always see through them. In this stage, please add water to foods such as  strained soups and hot cereal. This will make them easy to swallow and drinkable.    Goals     Have no more than 1/4 to 1/2 cup of strained or diluted food each meal.     Add more liquid choices.     Keep drinking 6 to 8 cups (48 to 64 ounces) of water and sugar-free drinks each day.     We suggest using an approved protein drink that meets these nutrition guidelines (one 8-ounce to 12-ounce serving):  15 to 30 grams of protein  Less than 250 calories  Less than 10 grams of fat  Less than 10 grams of sugar.    Tips   To make sure you drink enough liquids, pace yourself and space your liquids during the day.      Slowly sip about 20 ounces before lunch, another 20 ounces before dinner and 20 ounces before bed.     Eat 3 meals at about the same times each day. Don't skip meals.   Your body may handle liquids better at room temperature. Don't drink them ice-cold.     If you can't handle food early in the morning, try an approved protein drink.    Grocery list for low-fat, full-liquid diet:    [] Skim, 1% milk, plain soy milk, unsweetened milk alternative or lactose-free milk    [] High-protein, fat-free or low-fat smooth yogurt, with no chunks of fruit or seeds (Greek or Thai yogurt are great options)    [] Low-fat, plain kefir    [] Sugar-free pudding    [] Approved protein drink (like  Bariatrix or Ensure Max)    [] Non-fat dry milk powder    [] Protein powder    [] Low-fat cream soup, strained    [] Malt-O-Meal (unflavored), Cream of Wheat (unflavored) or Cream of Rice; please add water to make it drinkable    [] 100% vegetable juice    [] Pulp-free juice (to mix 1/2 and 1/2 with water)    [] Herbal tea    [] Sugar-free, non-carbonated, non-caffeinated flavored beverages    [] Sugar-free enhanced water    [] Sugar-free popsicles    [] Any clear liquids allowed in your stage 1 diet      Sample menu for a low-fat, full-liquid diet    Eat ONLY 1/4 to 1/2 cup full liquid at each meal. Choose from the menu below, according to your serving size. Ask your care team if you have any questions.    ________________________________________________  Breakfast       1/4 cup Cream of Wheat,    diluted     1/4 cup low-calorie yogurt (no fruit chunks or seeds)    OR:     Approved protein drink    (1/2 serving)  ________________________________________________  Mid-morning      Approved protein drink    (1/2 serving)  ________________________________________________  Lunch       1/4 cup low-calorie yogurt (no fruit chunks or seeds)     1/4 cup sugar-free Pudding  ________________________________________________  Mid-afternoon     Approved protein drink (1/2 serving)  ________________________________________________  Dinner      1/4 cup low-fat cream soup,    strained     1/4 cup low-calorie yogurt    (no fruit chunks or seeds)  ________________________________________________  Between meals     Sip 6 to 8 cups (48 to 64    ounces) of water and sugar-free    drinks per day

## 2023-10-11 VITALS
HEART RATE: 68 BPM | WEIGHT: 285 LBS | RESPIRATION RATE: 18 BRPM | HEIGHT: 65 IN | SYSTOLIC BLOOD PRESSURE: 128 MMHG | DIASTOLIC BLOOD PRESSURE: 78 MMHG | BODY MASS INDEX: 47.48 KG/M2 | TEMPERATURE: 98.4 F | OXYGEN SATURATION: 96 %

## 2023-10-11 LAB — GLUCOSE BLDC GLUCOMTR-MCNC: 93 MG/DL (ref 70–99)

## 2023-10-11 PROCEDURE — 250N000011 HC RX IP 250 OP 636: Mod: JZ | Performed by: PHYSICIAN ASSISTANT

## 2023-10-11 PROCEDURE — 258N000003 HC RX IP 258 OP 636: Performed by: PHYSICIAN ASSISTANT

## 2023-10-11 PROCEDURE — 250N000013 HC RX MED GY IP 250 OP 250 PS 637: Performed by: PHYSICIAN ASSISTANT

## 2023-10-11 RX ADMIN — OXYCODONE HYDROCHLORIDE 5 MG: 5 TABLET ORAL at 14:30

## 2023-10-11 RX ADMIN — FAMOTIDINE 20 MG: 10 INJECTION INTRAVENOUS at 06:54

## 2023-10-11 RX ADMIN — OXYCODONE HYDROCHLORIDE 5 MG: 5 TABLET ORAL at 00:02

## 2023-10-11 RX ADMIN — ONDANSETRON 4 MG: 2 INJECTION INTRAMUSCULAR; INTRAVENOUS at 04:51

## 2023-10-11 RX ADMIN — HYOSCYAMINE SULFATE 125 MCG: 0.12 TABLET SUBLINGUAL at 14:31

## 2023-10-11 RX ADMIN — HYOSCYAMINE SULFATE 125 MCG: 0.12 TABLET SUBLINGUAL at 03:29

## 2023-10-11 RX ADMIN — CYCLOBENZAPRINE 10 MG: 10 TABLET, FILM COATED ORAL at 15:22

## 2023-10-11 RX ADMIN — ACETAMINOPHEN 975 MG: 325 TABLET, FILM COATED ORAL at 10:41

## 2023-10-11 RX ADMIN — ENOXAPARIN SODIUM 40 MG: 40 INJECTION SUBCUTANEOUS at 08:08

## 2023-10-11 RX ADMIN — SODIUM CHLORIDE, POTASSIUM CHLORIDE, SODIUM LACTATE AND CALCIUM CHLORIDE: 600; 310; 30; 20 INJECTION, SOLUTION INTRAVENOUS at 06:53

## 2023-10-11 RX ADMIN — HYOSCYAMINE SULFATE 125 MCG: 0.12 TABLET SUBLINGUAL at 10:41

## 2023-10-11 RX ADMIN — HYOSCYAMINE SULFATE 125 MCG: 0.12 TABLET SUBLINGUAL at 06:54

## 2023-10-11 RX ADMIN — OMEPRAZOLE 20 MG: 20 CAPSULE, DELAYED RELEASE ORAL at 06:54

## 2023-10-11 RX ADMIN — OXYCODONE HYDROCHLORIDE 5 MG: 5 TABLET ORAL at 14:28

## 2023-10-11 RX ADMIN — SIMETHICONE 40 MG: 20 EMULSION ORAL at 12:25

## 2023-10-11 RX ADMIN — OXYCODONE HYDROCHLORIDE 5 MG: 5 TABLET ORAL at 08:08

## 2023-10-11 RX ADMIN — ACETAMINOPHEN 975 MG: 325 TABLET, FILM COATED ORAL at 04:39

## 2023-10-11 RX ADMIN — SIMETHICONE 40 MG: 20 EMULSION ORAL at 08:08

## 2023-10-11 RX ADMIN — CYCLOBENZAPRINE 10 MG: 10 TABLET, FILM COATED ORAL at 08:08

## 2023-10-11 RX ADMIN — ACETAMINOPHEN 975 MG: 325 TABLET, FILM COATED ORAL at 15:22

## 2023-10-11 ASSESSMENT — ACTIVITIES OF DAILY LIVING (ADL)
ADLS_ACUITY_SCORE: 22
ADLS_ACUITY_SCORE: 22
ADLS_ACUITY_SCORE: 20

## 2023-10-11 NOTE — PROGRESS NOTES
General surgery note    Discussed operative findings.  Afebrile vital signs good  Pain is on and off, tolerable.  She has been able to ambulate and tolerate diet well.  Abdomen is benign    Recovering well from laparoscopic gastric bypass  Follow protocol  Home later today if continue current progress.

## 2023-10-11 NOTE — DISCHARGE SUMMARY
[facility] Discharge Summary    Siddharth Fontanez MRN# [id number]   Age: 33 year old YOB: 1990     Date of Admission:  10/10/2023  Date of Discharge:  10/11/2023  Admitting Provider:  Alvaro Zayas MD  Discharge Provider:  Simran Bond PA-C with Dr. Zayas  Discharging Service: Bariatric Surgery     Primary Provider: Clinic, [facility] [place]  Primary Care Physician Phone Number: [phone]          Admission Diagnoses:   Principle Diagnosis: Morbid obesity (H) [E66.01]  Morbid obesity with BMI of 45.0-49.9, adult (H) [E66.01, Z68.42]  Secondary Diagnoses:          Discharge Diagnosis:     Morbid obesity (H) [E66.01]          Procedures:     Procedure(s): Laparoscopic Amairani en Y gastric bypass            Discharge Medications:     Current Discharge Medication List        START taking these medications    Details   cyclobenzaprine (FLEXERIL) 5 MG tablet Take 1 tablet (5 mg) by mouth 3 times daily for 5 days  Qty: 15 tablet, Refills: 0    Associated Diagnoses: Postoperative pain      hyoscyamine (LEVSIN/SL) 0.125 MG sublingual tablet Place 1 tablet (0.125 mg) under the tongue every 4 hours for 5 days  Qty: 30 tablet, Refills: 0    Associated Diagnoses: Postoperative pain      omeprazole (PRILOSEC OTC) 20 MG EC tablet Take 1 tablet (20 mg) by mouth daily before breakfast  Qty: 90 tablet, Refills: 0    Associated Diagnoses: Postoperative pain; History of Amairani-en-Y gastric bypass      ondansetron (ZOFRAN ODT) 4 MG ODT tab Take 1 tablet (4 mg) by mouth every 6 hours as needed for nausea  Qty: 12 tablet, Refills: 0    Associated Diagnoses: Postoperative pain; History of Amairani-en-Y gastric bypass      oxyCODONE (ROXICODONE) 5 MG tablet Take 1 tablet (5 mg) by mouth every 4 hours as needed for pain  Qty: 12 tablet, Refills: 0    Associated Diagnoses: Postoperative pain      simethicone (MYLICON) 40 MG/0.6ML suspension Take 0.6 mLs (40 mg) by mouth 4 times daily for 5 days  Qty: 12 mL, Refills: 0     Associated Diagnoses: Postoperative pain           CONTINUE these medications which have NOT CHANGED    Details   calcium carbonate-vitamin D (CALCIUM 600 + D) 600-10 MG-MCG per tablet Take 1 tablet by mouth 2 times daily Take one twice daily and at least 2 hours apart from iron  Qty: 180 tablet, Refills: 3    Comments: If calcium 600 citrate + 400 mg tabs are available that would be preferred instead of carbonate  Associated Diagnoses: Morbid obesity (H)      cholecalciferol 125 MCG (5000 UT) CAPS Take 1 capsule (5,000 Units) by mouth daily  Qty: 90 capsule, Refills: 3    Associated Diagnoses: Morbid obesity (H)      desogestrel-ethinyl estradiol (APRI) 0.15-30 MG-MCG tablet Take 1 tablet by mouth daily  Qty: 84 tablet, Refills: 3    Associated Diagnoses: Abnormal uterine bleeding      Multiple Vitamins-Iron (QC DAILY MULTIVITAMINS/IRON) TABS Take 2 tablets by mouth daily  Qty: 180 tablet, Refills: 3    Comments: Must contain the following: At least 18 mg of Iron, At least 10 TO 15 MG zinc, At least 2000 IUs of vitamin A, At least 400 mg of Folic Acid, At least 1 mg of Copper, At least 1.5 mg of Thiamine.  Associated Diagnoses: Morbid obesity (H)      SENNA-docusate sodium (SENNA S) 8.6-50 MG tablet Take 2 tablets by mouth 2 times daily  Qty: 120 tablet, Refills: 2    Associated Diagnoses: Chronic constipation      topiramate (TOPAMAX) 25 MG tablet Take 25 mg week, increase to 50 mg week 2, then increase to 75 mg week 3 and beyond  Qty: 90 tablet, Refills: 2    Associated Diagnoses: Morbid obesity (H); Migraine without aura and without status migrainosus, not intractable                 Allergies:       No Known Allergies          Brief History of Illness:   Siddharth Fontanez is a 33 year old-year-old female who underwent preoperative screening in anticipation for potential bariatric surgery. She was deemed an appropriate candidate for bariatric surgery by the consensus of our [facility] team. In the office,  surgical options were thoroughly discussed. She was furnished with a copy of our specialized consent form for bariatric surgery. The potential risks as outlined in that document were all thoroughly reviewed. All questions were answered and she wished to proceed with laparoscopic Amairani en Y gastric bypass.    On the day of surgery, after reviewing the risks, benefits, and possible complications, informed consent was obtained and the patient underwent the above procedure.  There were no complications.  Please see the Operative Report for full details.           Hospital Course:   Siddharth Fontanez's hospital course was unremarkable.  She recovered as anticipated and experienced no post-operative complications.  On the day of discharge she was able to tolerate 500 ml of bariatric clear liquids and pain was controlled with oral analgesia.  She was voiding adequately and ambulating the halls.  Vitals were wnl and lovenox was started.  Siddharth will start bariatric full liquid diet tomorrow and has already discussed details with dietitian prior to surgery.      On the date of discharge, the patient was discharged to home in stable condition and afebrile.  She verbalized understanding of all discharge instructions and felt comfortable with the discharge plan.  She was asked to call with any further questions or concerns.         Image Results From This Hospital Stay:        Results for orders placed or performed in visit on 02/14/23   US Pelvic Transabdominal and Transvaginal    Narrative    [facility]  ULTRASOUND - PELVIC GYN- Transabdominal and Transvaginal     Referring MD: Michael Liu MD     ===================================     CLINICAL INFORMATION     Indications for ultrasound:   Bleeding/Menses - AUB     LMP: 17 Jan 2023    Hormones: OCP      Measurements:  Uterus:  7.3 x 5.2 x 4.6 cm     Position is retroverted.  Contour is smooth/regular.     Endo cav: 13.58 mm       Prominent     Right ovary:  "2.9 x 2.0 x 1.6 cm  Wnl  Left ovary:   2.2 x 2.3 x 2.0  cm Wnl     Cul de sac: no free fluid     ===================================  Complete pelvic ultrasound using realtime   transabdominal and transvaginal scanning.  Bladder appears normal.    Normal uterus  Normal bilateral ovaries  Prominent endometrial lining  No free fluid in the cul de sac    Prominent heterogenous endometrial lining; consider endometrial sampling   if appropriate.    Evi Mancia MD   Obstetrics and Gynecology  [facility]     Note: federal law requires the release of results to patients even prior   to the ordering provider viewing the result. Your provider will notify   you, generally within 24 hours, of any critical results. If follow up is   necessary, you will be notified at that time. Normal results, and abnormal   but non-urgent results, will generally be addressed within 48-72 hours.            Condition on Discharge:        Discharge condition: Stable   Discharge vitals: Blood pressure 128/78, pulse 68, temperature 98.4  F (36.9  C), temperature source Oral, resp. rate 18, height 1.651 m (5' 5\"), weight 129.3 kg (285 lb), SpO2 96%.           Discharge Disposition:     Discharged to home          Discharge Instructions and Follow-Up:      Siddharth Fontanez was asked to follow up with the [facility] within 1 week.    Simran Bond PA-C  [facility]: [phone]       "

## 2023-10-11 NOTE — OP NOTE
Surgeon: Alvaro Zayas MD   1 st Assistant: Simran Bond PA-C, The physicians assistant was medically necessary for their expertise in camera management, suctioning, suturing, and retraction.    PREOPERATIVE DIAGNOSIS:   Morbid Obesity  Indication for operation:  Body mass index is 47.43 kg/m .  Siddharth Fontanez has been previously unsuccessful with medical treatment for obesity and some of her comorbidities are directly related to obesity  Past Medical History:   Diagnosis Date    Irregular periods     Migraine 08/10/2022    Papanicolaou smear of cervix with low grade squamous intraepithelial lesion (LGSIL) 08/25/2022       POSTOPERATIVE DIAGNOSIS:   Same    PROCEDURE:   Laparoscopic Gastric Bypass with Derrick-en-Y Gastrojejunostomy (75 cm biliopancreatic limb and 75 cm derrick limb)  ANESTHESIA:   General Endotracheal Anesthesia   COMPLICATIONS:   None  EBL:   25 ml        Events:   The patient was taken to the operating room and placed in the supine position. After successful induction of general endotracheal anesthesia, an orogastric tube was placed to decompress the stomach. The patient was placed in the Lithotomy position, supine with legs abducted to 30 degrees. Care was taken to pad the exposed extremities. The patient's abdomen was prepped and draped in the normal sterile fashion. A direct visualization trocar was placed in the left subcostal position in the midclavicular line and access to abdominal cavity was obtained under visualization. Pneumoperitoneum was then established without complications. After evaluation of the abdominal contents from this trocar position, four other 12-mm ports were placed under direct visualization.  Given the patient body habitus an additional 5 mm trocar was placed in the left flank to help with retraction.  Local anesthetic was used for TAP block.   Left lobe of the liver was retracted with a Milton device on the sub-xyphoid location. We exposed the phrenoesophageal  ligament. This was taken down with the hook electrocautery. A blunt palpation probe was used to expose the esophagus and the left bundle of the esophageal joanna. At this point we released our retraction on the stomach and measured about seven centimeters from the esophago-gastric junction along the lesser curvature. The Nerve of Latarjet was identified and preserved. We made a window along the lesser curvature with the Harmonic Scalpel and entered the lesser sac. Once the lesser sac was entered, a ADEEL staple loads were used to progress cephalad toward the Angle of His. A small gastric pouch was created.  We turned our attention to dividing the greater omentum.  We identified the ligament of Treitz and followed the small intestine about 75 cm, this loop was brought up to our gastric pouch and a side-to-side gastrojejunostomy was created with a combination of linear stapler and V-Loc suture.  The anastomosis was tested with saline, air, and methylene blue containing solution which demonstrated no evidence of hemorrhage, air leakage, nor fluid leakage.  Linear stapler was used to transect this loop of small intestine just proximal to our gastrojejunostomy.  We then followed the small intestine, Amairani limb, about 75 cm at which point the biliopancreatic limb was reanastomosed with a linear staple technique.  The mesenteric defect and the Petersons defect were both closed with running sutures.  The gallbladder was inspected and found to be normal.  The abdomen was then copiously irrigated and checked for hemostasis. The effluent was evacuated from the abdomen.  The supraumbilical port was also closed with 0 Vicryl suture.  The remaining ports were removed without evidence of bleeding.  The pneumoperitoneum was then evacuated. The wounds were irrigated and found to be hemostatic. The skin was closed using 4-0 Monocryl. Steri strips were applied.  Counts reported as correct.  No immediate complications.

## 2023-10-11 NOTE — PLAN OF CARE
Goal Outcome Evaluation:      Plan of Care Reviewed With: patient    Overall Patient Progress: improvingOverall Patient Progress: improving     POD #1 Laparoscopic Amairani-en-y Gastric Bypass. A&Ox4. VSS on RA. Pain managed with scheduled Tylenol, PRN Oxycodone, heat/ice packs. On a bariatric clear diet- intake 330 mL overnight. Reports intermittent nausea- relieved with PRN Zofran x1 & Scopolamine patch in place. PIV infusing LR at 125 mL/hr. Adequate voiding in bathroom. BS hypoactive- reports not passing gas. Abdominal lap sites with steri strips- CDI. Abdominal binder in place. Up SBA with IV pole- ambulated in room. Tolerating IS. Continue to monitor.

## 2023-10-11 NOTE — PLAN OF CARE
Goal Outcome Evaluation:       A&OX4. VSS on RA. Hypoactive BS, not passing flatus, but is burping a little bit this afternoon. C/o of abdomen cramping and gas pain. Oxycodone, tylenol, flexeril, and simethicone given for pain. Encourage ambulation in room and connelly often. Per MADDIE Khalil- restart senna POD 3 (10/13). Discharge instruction and medication went over with pt. Questions and concerns addressed. Patient discharge to home.

## 2023-10-11 NOTE — PLAN OF CARE
Goal Outcome Evaluation:       Diagnosis:Morbid obesity  POD#:0 of Amairani-en-y gastric bypass  Mental Status:AOx4  Activity/dangle: Ax1  Diet:Narciso clear liquid. Was able to drink 30mls of water every 30 mins for 2 hours and then refused to drink any more d/t too much discomfort.  Pain: intermittent abdominal/gas pain managed with prn dilaudid x1  Gonzalez/Voiding:Voided in BR, +BS.  Tele/Restraints/Iso:n/a  02/LDA:VSS on 1LNC, PIV infusing LR at 125ml/hr  D/C Date:pending pt progress  Other Info: 7 port sites to abdomen, CDI. Abdominal binder in place.

## 2023-10-11 NOTE — PROGRESS NOTES
"Bariatric Surgery Progress Note         Assessment:      Siddharth Fontanez is a 33 year old female 1 day s/p laparoscopic gastric Inna en Y bypass  Morbid Obesity, BMI >40            Plan:   - Continue bariatric clear liquids using med cups. May advance as tolerated with goal of 64 oz daily.  - May saline lock now that drinking 500 ml.  - Transition to PO pain meds today, transition from Pepcid to Prilosec.  - Resumed PTA medications.  - Seen by Pharmacy, appreciate assistance.  - Encourage IS and deep breathe.  - Labs not drawn this morning and okay to defer these. Patient ambulating well. Encourage QID at home. Lovenox given this morning.    DISPOSITION:  - Discharge today.  - Instructions reviewed and discharge orders in place.  - Patient to start bariatric full liquid diet tomorrow at home as discussed with dietitian prior to surgery.  - Prilosec, levsin, simethicone, zofran, flexeril and oxycodone sent.    FOLLOW UP:  - Follow up with weight loss clinic next week.        Interval History:   Siddharth Fontanez is seen this morning on surgical rounds. She denies nausea today. Tolerated 500 ml of clear liquids already. Denies flatus or BM. Endorses some abdominal pain which is worse with movement, controlled with oral analgesia. Ambulating and voiding well. Hopeful for discharge home today. No labs drawn. Vitals wnl.         Physical Exam:   /78 (BP Location: Left arm)   Pulse 68   Temp 98.4  F (36.9  C) (Oral)   Resp 18   Ht 1.651 m (5' 5\")   Wt 129.3 kg (285 lb)   SpO2 96%   BMI 47.43 kg/m    I/O last 3 completed shifts:  In: 3143 [P.O.:270; I.V.:2873]  Out: 1725 [Urine:1700; Blood:25]  General: NAD, pleasant, alert and oriented x3  Abdomen: soft, non tender, non distended.  Binder on.  Incision: steris and bandaids without saturation, incisions CDI  Extremities: moving extremities, no gross deformities    Labs:     Results for orders placed or performed during the hospital encounter of 10/10/23 (from the past " 24 hour(s))   HCG qualitative urine - Pre-Op   Result Value Ref Range    hCG Urine Qualitative Negative Negative   Creatinine   Result Value Ref Range    Creatinine 0.99 (H) 0.51 - 0.95 mg/dL    GFR Estimate 76 >60 mL/min/1.73m2   Lactic Acid STAT   Result Value Ref Range    Lactic Acid 1.7 0.7 - 2.0 mmol/L   Extra Tube    Narrative    The following orders were created for panel order Extra Tube.  Procedure                               Abnormality         Status                     ---------                               -----------         ------                     Extra Purple Top Tube[[id number]]                            Final result                 Please view results for these tests on the individual orders.   Extra Purple Top Tube   Result Value Ref Range    Hold Specimen JI    Glucose by meter   Result Value Ref Range    GLUCOSE BY METER POCT 93 70 - 99 mg/dL         Simran Bond PA-C   [facility]: [phone]

## 2023-10-12 ENCOUNTER — PATIENT OUTREACH (OUTPATIENT)
Dept: CARE COORDINATION | Facility: CLINIC | Age: 33
End: 2023-10-12
Payer: COMMERCIAL

## 2023-10-12 ENCOUNTER — TELEPHONE (OUTPATIENT)
Dept: SURGERY | Facility: CLINIC | Age: 33
End: 2023-10-12
Payer: COMMERCIAL

## 2023-10-12 NOTE — TELEPHONE ENCOUNTER
Called pt to check on postop status.  Pt responses below:    Surgery Date: 10/10/2023  Surgery Type: RNY  Surgeon: LEL    Fluid Intake: 40 oz of water, protein water,  broth, and SF popsicles. Will progress to low-fat full liquids.    Pain Assessment:    Pain level: ranges from 3-7/10, gets to a 7 when moving around  Location: Center near belly button   When did it start: POD#1  Is it constant? Comes in waves    How often does it occur? Intermittently with movement  What makes it better or worse? Oxycodone (last dose yesterday), flexeril, and tylenol 500 mg. Discussed that pt can have up to 3000 mg of tylenol in a 24 hour period  Description: Sharp- sounds incisional    Medications:  RX for PPI? Omeprazole 20 mg   Do you understand how to take your medications postop?  Yes  Reviewed postop med changes:  Yes  Have you restarted all of the medications that weren't changed after surgery? Has not restarted vitamins yet. Will discharge topiramate as was taking it for weight loss  Do you have any questions about how to take your medications? No  START taking these medications     Details   cyclobenzaprine (FLEXERIL) 5 MG tablet Take 1 tablet (5 mg) by mouth 3 times daily for 5 days  Qty: 15 tablet, Refills: 0     Associated Diagnoses: Postoperative pain    Taking   hyoscyamine (LEVSIN/SL) 0.125 MG sublingual tablet Place 1 tablet (0.125 mg) under the tongue every 4 hours for 5 days  Qty: 30 tablet, Refills: 0     Associated Diagnoses: Postoperative pain    Taking   omeprazole (PRILOSEC OTC) 20 MG EC tablet Take 1 tablet (20 mg) by mouth daily before breakfast  Qty: 90 tablet, Refills: 0     Associated Diagnoses: Postoperative pain; History of Amairani-en-Y gastric bypass    Taking   ondansetron (ZOFRAN ODT) 4 MG ODT tab Take 1 tablet (4 mg) by mouth every 6 hours as needed for nausea  Qty: 12 tablet, Refills: 0     Associated Diagnoses: Postoperative pain; History of Amairani-en-Y gastric bypass    Not needed   oxyCODONE  (ROXICODONE) 5 MG tablet Take 1 tablet (5 mg) by mouth every 4 hours as needed for pain  Qty: 12 tablet, Refills: 0     Associated Diagnoses: Postoperative pain    Taking- not taken since yesterday   simethicone (MYLICON) 40 MG/0.6ML suspension Take 0.6 mLs (40 mg) by mouth 4 times daily for 5 days  Qty: 12 mL, Refills: 0     Associated Diagnoses: Postoperative pain    Taking               CONTINUE these medications which have NOT CHANGED     Details   calcium carbonate-vitamin D (CALCIUM 600 + D) 600-10 MG-MCG per tablet Take 1 tablet by mouth 2 times daily Take one twice daily and at least 2 hours apart from iron  Qty: 180 tablet, Refills: 3     Comments: If calcium 600 citrate + 400 mg tabs are available that would be preferred instead of carbonate  Associated Diagnoses: Morbid obesity (H)    Not resumed   cholecalciferol 125 MCG (5000 UT) CAPS Take 1 capsule (5,000 Units) by mouth daily  Qty: 90 capsule, Refills: 3     Associated Diagnoses: Morbid obesity (H)       desogestrel-ethinyl estradiol (APRI) 0.15-30 MG-MCG tablet Take 1 tablet by mouth daily  Qty: 84 tablet, Refills: 3     Associated Diagnoses: Abnormal uterine bleeding    Resumed   Multiple Vitamins-Iron (QC DAILY MULTIVITAMINS/IRON) TABS Take 2 tablets by mouth daily  Qty: 180 tablet, Refills: 3     Comments: Must contain the following: At least 18 mg of Iron, At least 10 TO 15 MG zinc, At least 2000 IUs of vitamin A, At least 400 mg of Folic Acid, At least 1 mg of Copper, At least 1.5 mg of Thiamine.  Associated Diagnoses: Morbid obesity (H)     Not resumed   SENNA-docusate sodium (SENNA S) 8.6-50 MG tablet Take 2 tablets by mouth 2 times daily  Qty: 120 tablet, Refills: 2     Associated Diagnoses: Chronic constipation    Not resumes   topiramate (TOPAMAX) 25 MG tablet Take 25 mg week, increase to 50 mg week 2, then increase to 75 mg week 3 and beyond  Qty: 90 tablet, Refills: 2     Associated Diagnoses: Morbid obesity (H); Migraine without aura and  without status migrainosus, not intractable  Will discontinue      Anti-Coagulation: Ambulation    Diet: Has only done clear liquids so far- discussed moving to LF full liquids if tolerating  How tolerated? Clears have gone down ok- pt will try progressing to full liquids. Discussed possible options including strained LF soups, cream of wheat, milk, protein shakes, yogurt, etc.    Blood Glucose Monitoring: No    Nausea: Occurs with drinking too much water at once. Has not tried zofran for nausea quite yet but advised pt to use if needed.    Vomiting: None    NSAIDs: No  Smoking: No    Fever: No. Informed pt to notify clinic of any fevers >100.4.    Incisions: Looked- band aids have come off, steri strips in place.  Has not showered yet. Discussed S/S of infection to watch for.    BMs:  Last BM: Day of surgery  Flatus: Not yet- discussed limiting narcotics, pushing fluids, ambulation, warm pack on lower belly, and warm diluted prune juice.  Pt reports she struggles with chronic constipation and takes senna on occasion.  Will advise pt to hold off on taking senna until she is passing gas.    Bloating/cramping: Denies  Informed pt it is normal to have no stool for up to a week if passing gas and not having bloating/cramping.    Urinary: No    Sleeping/Rest: Not the best- it is hard to get comfortable, recommended using extra pillows to elevate head and shoulders    Activity: Yes, walking around her house.  Are you up at least every hour or two and taking a short walk?    Activity caution:  Advised pt to be careful to avoid straining abdominal muscles during recovery.    Incentive Spirometer: Yes, using it throughout the day- pulling 1500     CPAP: None    Other symptoms (CP/SOB/dizzy/rapid HR): Denies    Plan/Advice:   Tuesday 10/17/23 @ 1pm w/ RN/PARudolphC  Advised pt to contact PCP and schedule hospital follow up visit within 1 week of discharge. Discuss medications or recommendations from discharging provider.  1 week  followup appointment verified.   Use of incentive spirometer reinforced.  Call clinic with any questions or concerns.  Reviewed that clinic staff are available on call during nights and weekends for postoperative concerns.  Number given.    No further questions or concerns now. Pt agrees to call if having any questions or concerns.    Jasmin MCCALLUM RN

## 2023-10-12 NOTE — PROGRESS NOTES
[facility]    Background: Transitional Care Management program identified per system criteria and reviewed by [facility] team for possible outreach.    Assessment: Upon chart review, [facility] Team member will not proceed with patient outreach related to this episode of Transitional Care Management program due to reason below:    Patient has active communication with a nurse, provider or care team for reason of post-hospital follow up plan.  Outreach call by [facility] team not indicated to minimize duplicative efforts.     Plan: Transitional Care Management episode addressed appropriately per reason noted above.      Rhoda Welch  [facility]    *Connected Care Resource Team does NOT follow patient ongoing. Referrals are identified based on internal discharge reports and the outreach is to ensure patient has an understanding of their discharge instructions.

## 2023-10-13 ENCOUNTER — TELEPHONE (OUTPATIENT)
Dept: SURGERY | Facility: CLINIC | Age: 33
End: 2023-10-13
Payer: COMMERCIAL

## 2023-10-13 NOTE — TELEPHONE ENCOUNTER
Pt s/p RNY on 10/10/23 w/ CROW.    Called pt to check on flatus.  Pt reports she started passing gas yesterday evening.  She also reports that she had a bowel movement today.  Describes it as medium-sized and runny. Describes the color as dark brown, denies clots or blood.    Inquired if she can have yogurt. Informed pt yogurt was okay as long as it does not contain chunks of fruit or is sweetened.    Pt expressed understanding- no further questions.    Jasmin MCCALLUM RN

## 2023-10-17 ENCOUNTER — OFFICE VISIT (OUTPATIENT)
Dept: SURGERY | Facility: CLINIC | Age: 33
End: 2023-10-17

## 2023-10-17 ENCOUNTER — OFFICE VISIT (OUTPATIENT)
Dept: SURGERY | Facility: CLINIC | Age: 33
End: 2023-10-17
Payer: COMMERCIAL

## 2023-10-17 VITALS
BODY MASS INDEX: 45.9 KG/M2 | WEIGHT: 275.5 LBS | RESPIRATION RATE: 18 BRPM | HEIGHT: 65 IN | OXYGEN SATURATION: 98 % | DIASTOLIC BLOOD PRESSURE: 79 MMHG | HEART RATE: 105 BPM | TEMPERATURE: 99 F | SYSTOLIC BLOOD PRESSURE: 112 MMHG

## 2023-10-17 VITALS
OXYGEN SATURATION: 98 % | TEMPERATURE: 99 F | DIASTOLIC BLOOD PRESSURE: 79 MMHG | SYSTOLIC BLOOD PRESSURE: 112 MMHG | RESPIRATION RATE: 18 BRPM | BODY MASS INDEX: 45.85 KG/M2 | WEIGHT: 275.5 LBS

## 2023-10-17 DIAGNOSIS — K91.2 POSTSURGICAL MALABSORPTION: ICD-10-CM

## 2023-10-17 DIAGNOSIS — Z98.84 BARIATRIC SURGERY STATUS: Primary | ICD-10-CM

## 2023-10-17 DIAGNOSIS — E66.01 MORBID OBESITY WITH BMI OF 45.0-49.9, ADULT (H): ICD-10-CM

## 2023-10-17 DIAGNOSIS — Z98.84 BARIATRIC SURGERY STATUS: ICD-10-CM

## 2023-10-17 DIAGNOSIS — E66.01 MORBID OBESITY (H): Primary | ICD-10-CM

## 2023-10-17 PROCEDURE — 99024 POSTOP FOLLOW-UP VISIT: CPT | Performed by: PHYSICIAN ASSISTANT

## 2023-10-17 PROCEDURE — 99207 PR NO CHARGE NURSE ONLY: CPT

## 2023-10-17 NOTE — PATIENT INSTRUCTIONS
PLAN:   Continue with recommended antacid medication for the next 3 months.   Strive to drink 64 oz of fluid daily.  Strive to move hourly while awake to decrease risk of developing a blood clot.  Asked patient to discontinue Levsin due to potential side effect of blurred vision. IF does not improve should see primary   Continue to do deep breathing exercises twice daily or use your spirometer.  Follow up with your primary care provider to discuss obesity related conditions by one month post op.   Start recommended postoperative vitamins within in the first 6 weeks.  Advance diet per Dietitian at your 2 week appointment.   Make follow up appointment to meet with psychologist at 1 and 3 months post operatively.   Wear binder to support abdominal muscles and gradually wean off over the next few weeks.   Return to clinic for 2 wk post op appointment and bring post op vitamins along.  Call with questions or concerns at any time.

## 2023-10-17 NOTE — PROGRESS NOTES
"[facility]   1 Week Surgical Follow-Up       PCP:  Clinic, [facility] [place]    DOS: 10/10/23  Surgeon: CROW  Surgery Type: Bypass  HISTORY OF PRESENT ILLNESS:  Siddharth Fontanez returns today for her follow-up appointment status post bariatric surgery.  She is currently using Tylenol for pain medication. Off Oxycodone x 2 days. Been tylenol but not today Refill Needed: No.  Patient's Pain Scale: 3 (In lower belly/apron. Also has low back pain- thinks related to her period.). The pain is located abdomen.  Patient's current daily fluid intake in oz is: Approx 48oz (per pt report) (H2O, flavored H2O, soups, cream of wheat, popcicles, yogurt).  Patient has started postoperative Vitamins: No.  Has a bit blurry vision since surgery. Finished flexeril yesterday and oxycodone. Still taking levsin (side effect is blurred vision)  Activity:   Activity: Walking inside- Q1hr for 5-10 minutes  REVIEW OF SYSTEMS:  GI:    Nausea: No  Vomiting: No  Diarrhea: No  Constipation: No  Last BM: Today (Normal appearing)  Dysphagia: No  GERD: No    CV/Pulmonary:   Chest Pain: No  Dizzy: No  Light Headed: No  SOB: No  Endo:  Diabetes: No  :    Contraception: Oral BC pills  Dysuria: No  Vascular:    LE Edema: No    PHYSICAL EXAMINATION:    /79 (BP Location: Left arm, Patient Position: Sitting, Cuff Size: Adult Large)   Pulse 105   Temp 99  F (37.2  C) (Oral)   Resp 18   Ht 5' 5\" (1.651 m)   Wt 275 lb 8 oz (125 kg)   SpO2 98%   BMI 45.85 kg/m    Pulse ranged from low 90's - 113 bpm. Patient drank water during visit and HR decreased  GENERAL: No acute distress. Alert and oriented time 3.  HEART: Regular rate and rhythm.  LUNGS:  Clear to auscultation bilaterally.  ABDOMEN: Soft, incisions clean,dry, and intact. Tenderness WNL for post op.  EXTREMITIES: No lower extremity edema bilaterally. No calf swelling or tenderness. Negative jyothi's  SKIN: No rashes.  PSYCHOLOGICAL: Stable. Pleasant      ASSESSMENT:    1. " S/P bariatric surgery.  2. Post surgical malabsorption      PLAN:   Continue with recommended antacid medication for the next 3 months.   Strive to drink 64 oz of fluid daily.  Asked patient to discontinue Levsin due to potential side effect of blurred vision. IF does not improve should see primary   Strive to move hourly while awake to decrease risk of developing a blood clot.  Continue to do deep breathing exercises twice daily or use your spirometer.  Follow up with your primary care provider to discuss obesity related conditions by one month post op.   Start recommended postoperative vitamins within in the first 6 weeks.  Advance diet per Dietitian at your 2 week appointment.   Make follow up appointment to meet with psychologist at 1 and 3 months post operatively.   Wear binder to support abdominal muscles and gradually wean off over the next few weeks.   Return to clinic for 2 wk post op appointment and bring post op vitamins along.  Call with questions or concerns at any time.

## 2023-10-17 NOTE — PROGRESS NOTES
Patient seen by GUERO in clinic who completed assessment.   Patient initially tachycardic in the 110s-120s.  Was advised to sip fluids for 30 minutes while on continuous portable pulse ox monitor while provider evaluated pt.  Pulse rechecked after ~ 30 min of oral fluids and was found to be in the 90s.  Pt was discharged in a stable condition.    Jasmin Saldana RN, BSN

## 2023-10-18 NOTE — PROGRESS NOTES
"BARIATRIC PROGRESS NOTE - 2 Week Post Op  DATE OF VISIT: October 18, 2023    Siddharth Fontanez  1990  female  [id number]  33 year old    ASSESSMENT:    REASON FOR VISIT:  Siddharth Fontanez is a 33 year old year old female presents today for a 2 Week Post Op nutrition follow-up appointment. Patient is accompanied by self      DIAGNOSIS:  Status: post gastric bypass surgery.   Obesity Grade III BMI >40    ANTHROPOMETRICS:  Height: 65\"  Initial weight: 316 lb    Current Weight: 271.9 lb   BMI: 45.25  kg/(m^2).    VITAMINS AND MINERALS:   Currently has not restarted any supplements  Does get periods    Prescribed by weight loss clinic provider:  600 mg Calcium With Vitamin D BID- AM/PM  5000 International units Vitamin D  2 Vitron C-mid morning    NUTRITION HISTORY:  Tolerating diet: Bariatric full liquid diet  Fluids/water intake: 60 ounces/day-water, 2 bottles Protein 2O water (15 grams protein), Kifer  Small bites: Yes  Portion size: 1/2 cup or less  20-30 minute meals: Yes  Fluids and meals  by 30 minutes: Yes  Breakfast: 1/4 cup cream of wheat with 1% milk  Lunch: 1/4 cup pureed lentil/ veggie soup-tried on 10/24 (2 weeks post-op)  Dinner: 1/4 cup cream of wheat or 8 oz unsweetened kefir  Snacks: sugar free popsicles or yogurt  Nausea: no  Vomiting: no  Constipation: no  Additional Information: Tried 4 oz prune juice + ~ 5 oz water and had 1 loose stool and was feeling like she got duping syndrome. Discussed using diluted prune juice only if feeling constipated. Patient had many appropriate questions.     PHYSICAL ACTIVITY:  Type: walking  Frequency: 5 days a week.  Duration: 20-30 minutes (depends on back pain)    DIAGNOSIS:   Current Nutrition Diagnosis: Altered gastrointestinal function related to alternation in gastrointestinal structure as evidenced by history of gastric bypass.     INTERVENTION  Nutrition Prescription: Recommended bariatric puree diet.    Goals:  Slowly start: 2 MVI, 100 mg Thiamine 1X per " week, 500 mcg vitamin B-12, sublingual  Slowly restart: vitamin D, iron, calcium with vitamin D (take at least 2 hours away from multivitamin/ mineral)  Start puree diet at day 14 post op.  Aim for 60 to 90 grams protein.  Continue 48 to 64 oz of fluid per day.    Implementation:  Discussed transition to puree diet.  Emphasized importance of adequate protein.  Reviewed required vitamins and mineral supplements.  Verbalizes fair-good understanding of surgery diet guidelines.  Assessed learning needs and learning preferences.      NUTRITION MONITORING AND EVALUATION:   Monitor diet tolerance and weight loss.      Anticipated Compliance: fair-good  Follow Up: Continue to monitor patient closely regarding weight loss and diet.  At 4 weeks Post Op    TIME SPENT WITH PATIENT: 25 minutes.  Oj Boswell RD, LD  [facility], [place]

## 2023-10-26 ENCOUNTER — OFFICE VISIT (OUTPATIENT)
Dept: SURGERY | Facility: CLINIC | Age: 33
End: 2023-10-26
Payer: COMMERCIAL

## 2023-10-26 VITALS
BODY MASS INDEX: 45.25 KG/M2 | HEART RATE: 99 BPM | SYSTOLIC BLOOD PRESSURE: 123 MMHG | OXYGEN SATURATION: 98 % | RESPIRATION RATE: 20 BRPM | DIASTOLIC BLOOD PRESSURE: 77 MMHG | WEIGHT: 271.9 LBS

## 2023-10-26 DIAGNOSIS — K91.2 POSTSURGICAL MALABSORPTION: ICD-10-CM

## 2023-10-26 DIAGNOSIS — Z98.84 BARIATRIC SURGERY STATUS: Primary | ICD-10-CM

## 2023-10-26 DIAGNOSIS — Z98.84 BARIATRIC SURGERY STATUS: ICD-10-CM

## 2023-10-26 DIAGNOSIS — E66.01 MORBID OBESITY WITH BMI OF 45.0-49.9, ADULT (H): Primary | ICD-10-CM

## 2023-10-26 DIAGNOSIS — E66.01 MORBID OBESITY WITH BMI OF 45.0-49.9, ADULT (H): ICD-10-CM

## 2023-10-26 PROCEDURE — 97803 MED NUTRITION INDIV SUBSEQ: CPT

## 2023-10-26 PROCEDURE — 99207 PR NO CHARGE NURSE ONLY: CPT

## 2023-10-26 NOTE — PATIENT INSTRUCTIONS
Macario Messina-  Welcome to the [facility], [place]! It was great to visit with you and learn about your progress. Below are the goals we discussed.  Goals:  Slowly start: 2 MVI, 100 mg Thiamine 1X per week, 500 mcg vitamin B-12, sublingual  Slowly restart: vitamin D, iron, calcium with vitamin D (take at least 2 hours away from multivitamin/ mineral)  Start puree diet at day 14 post op.  Aim for 60 to 90 grams protein.  Continue 48 to 64 oz of fluid per day.    Nutrition Educational Materials:  Provided in clinic    We will see you at your 4 week post-op visit.  Thanks!  Oj Boswell RD, LD  [facility], [place]

## 2023-10-26 NOTE — PROGRESS NOTES
[facility]  2 Week Surgical Follow-Up     HISTORY OF PRESENT ILLNESS:  The patient returns today for two week follow-up appointment status post gastric bypass  The patient is accompanied by self.   The patient is drinking 70 oz of fluid daily, including H2O, flavored H2O, pro water, soups, cream of wheat, popcicles, yogurt.     Pain:    The patient is currently using Tylenol for pain medication.  The patient rates pain at a 2/10  on the pain scale. The pain is located in low back.      Activity:  The patient is considered a fall risk: No. Interventions to secure the patient's safety are in place.  What are you doing for physical activity?  Walking inside/outside  How many days per week?  Daily  How many minutes per day? 10  Review of Systems:  GI:    Nausea occurs never.           Vomiting occurs never.    GERD occurs never.           Diarrhea occurs never.  Patient's last bowel movement was yesterday, with the color noted as brown.  Patient is taking N/A to control loose bowel movements.          Constipation occurs never.  Patient's last bowel movement was yesterday. Patient is passing gas.             CV/Pulmonary:   Dizziness occurs never.     Shortness of Breath occurs never.  Chest pain occurs never.    Vascular:    Leg swelling none.     Krystal's Negative     :  Form of birth control is other BC pills       PHYSICAL EXAMINATION:    VITALS:  See Epic for VS.  LUNGS:  clear to auscultation  HEART:  Apical pulse strong, regular.  ABDOMEN:  Abdomen soft, non-tender. BS normal.   INCISIONS:  dry and intact.  Steristrips removed. Umbilicus incision tender- noted with small area of open skin. This caused a stinging sensation for patient. Area was dressed with bacitracin and band-aid. Pt was given additional packets of bacitracin and band-aids to take home. Adhesive remover given to patient.    MEDICATIONS/ALLERGIES REVIEWED:  Yes    ASSESSMENT:    1.  2 weeks status post gastric bypass  surgery.    PLAN:    Increase activity per physical therapist recommendations today.   Follow up with your primary care provider to obesity related conditions by one month post op.   Advance diet per Dietitian at your 2 week appointment.   Start recommended postoperative vitamins within in the first 6 weeks per Dietitian  Strive to drink 64 oz of fluid daily.  Wear binder to support abdominal muscles and gradually wean off over the next few weeks.   Continue to do deep breathing exercises twice daily or use your spirometer.   Return to clinic in 1 week for 2 wk post op appointment.  Call with questions or concerns at any time.  Return to clinic postop week 4.  Return to clinic postop month 3.    INTERVENTIONS:      Symptoms given re: signs and symptoms of infection and when to call clinic. Patient verbalized understanding.    Patient exercise/activity restrictions reviewed; exercise handout given.  Exercise plan postop is walking and elliptical.  Reviewed when patient can return to bathing and swimming.  Reviewed signs/symptoms of DVT with patient.  Patient return to work date is 11/8/23.  Reviewed FMLA. No further forms needed at this time.    No further needs or questions at this time. Patient agrees to call clinic with any questions or concerns prior to next appointment.    Jasmin MCCALLUM RN

## 2023-10-27 ENCOUNTER — TELEPHONE (OUTPATIENT)
Dept: SURGERY | Facility: CLINIC | Age: 33
End: 2023-10-27
Payer: COMMERCIAL

## 2023-10-27 DIAGNOSIS — E66.01 MORBID OBESITY (H): ICD-10-CM

## 2023-10-27 DIAGNOSIS — K91.2 POSTSURGICAL MALABSORPTION: Primary | ICD-10-CM

## 2023-10-27 DIAGNOSIS — Z98.84 BARIATRIC SURGERY STATUS: ICD-10-CM

## 2023-10-27 RX ORDER — LANOLIN ALCOHOL/MO/W.PET/CERES
100 CREAM (GRAM) TOPICAL WEEKLY
Qty: 24 TABLET | Refills: 0 | Status: CANCELLED | OUTPATIENT
Start: 2023-10-27

## 2023-10-27 RX ORDER — LANOLIN ALCOHOL/MO/W.PET/CERES
1000 CREAM (GRAM) TOPICAL DAILY
Qty: 30 TABLET | Refills: 11 | Status: SHIPPED | OUTPATIENT
Start: 2023-10-27

## 2023-10-27 RX ORDER — LANOLIN ALCOHOL/MO/W.PET/CERES
100 CREAM (GRAM) TOPICAL WEEKLY
Qty: 30 TABLET | Refills: 11 | Status: SHIPPED | OUTPATIENT
Start: 2023-10-27 | End: 2023-11-17

## 2023-10-27 NOTE — TELEPHONE ENCOUNTER
Informed by Oj VENTURA that pt would like her vitamins prescribed.  Preferred pharmacy is [facility].  Will miky up orders and route to MKD to sign.  6 month prescriptions miky'd up.    Jasmin MCCALLUM RN

## 2023-10-27 NOTE — TELEPHONE ENCOUNTER
----- Message from Oj Boswell, RD, LD sent at 10/26/2023 12:19 PM CDT -----  Regarding: Vitamins  Hi Sammy Messina would like Multivitamins, Thiamin and vitamin B-12 proscribed to [facility] in [place] (on [address]  Thanks!  Oj

## 2023-10-30 RX ORDER — CALCIUM CARBONATE/VITAMIN D3 600 MG-10
1 TABLET ORAL 2 TIMES DAILY
Qty: 360 TABLET | Refills: 1 | Status: SHIPPED | OUTPATIENT
Start: 2023-10-30

## 2023-10-30 RX ORDER — MENTHOL 5.8 MG/1
2 LOZENGE ORAL DAILY
Qty: 180 TABLET | Refills: 3 | Status: SHIPPED | OUTPATIENT
Start: 2023-10-30

## 2023-11-03 ENCOUNTER — APPOINTMENT (OUTPATIENT)
Dept: GENERAL RADIOLOGY | Facility: CLINIC | Age: 33
End: 2023-11-03
Attending: EMERGENCY MEDICINE
Payer: COMMERCIAL

## 2023-11-03 ENCOUNTER — HOSPITAL ENCOUNTER (EMERGENCY)
Facility: CLINIC | Age: 33
Discharge: HOME | End: 2023-11-03
Attending: EMERGENCY MEDICINE | Admitting: EMERGENCY MEDICINE
Payer: COMMERCIAL

## 2023-11-03 VITALS
HEART RATE: 77 BPM | TEMPERATURE: 97.5 F | DIASTOLIC BLOOD PRESSURE: 78 MMHG | RESPIRATION RATE: 17 BRPM | SYSTOLIC BLOOD PRESSURE: 120 MMHG | OXYGEN SATURATION: 99 %

## 2023-11-03 DIAGNOSIS — J06.9 UPPER RESPIRATORY TRACT INFECTION, UNSPECIFIED TYPE: ICD-10-CM

## 2023-11-03 LAB
FLUAV RNA SPEC QL NAA+PROBE: NEGATIVE
FLUBV RNA RESP QL NAA+PROBE: NEGATIVE
RSV RNA SPEC NAA+PROBE: NEGATIVE
SARS-COV-2 RNA RESP QL NAA+PROBE: NEGATIVE

## 2023-11-03 PROCEDURE — 99284 EMERGENCY DEPT VISIT MOD MDM: CPT | Mod: 25

## 2023-11-03 PROCEDURE — 71046 X-RAY EXAM CHEST 2 VIEWS: CPT

## 2023-11-03 PROCEDURE — 87637 SARSCOV2&INF A&B&RSV AMP PRB: CPT | Performed by: EMERGENCY MEDICINE

## 2023-11-03 ASSESSMENT — ACTIVITIES OF DAILY LIVING (ADL): ADLS_ACUITY_SCORE: 35

## 2023-11-03 NOTE — ED PROVIDER NOTES
History     Chief Complaint:  Cough    HPI   Henry Bautista is a 33 year old female cough.  Patient notes she has had persistent cough for the last 2 weeks as well as subjective fever.  Cough is occasionally productive.  Reports that she has a coworker who was recently diagnosed with pneumonia.  She is a non-smoker and does not report any other significant health history.      Independent Historian:   None - Patient Only    Review of External Notes:   N/A    Medications:    No current medications    Past Medical History:    No significant past medical history reported    Past Surgical History:    No past surgical history on file.     Physical Exam   Patient Vitals for the past 24 hrs:   BP Temp Temp src Pulse Resp SpO2   11/03/23 1544 120/78 97.5  F (36.4  C) Temporal 77 17 99 %        Physical Exam  General: Alert and cooperative with exam. Patient in mild distress. Normal mentation.  Nontoxic appearance  Head:  Scalp is NC/AT  Eyes:  No scleral icterus, PERRL  ENT:  The external nose and ears are normal. The oropharynx is normal and without erythema; mucus membranes are moist. Uvula midline, no evidence of deep space infection.  Neck:  Normal range of motion without rigidity.  CV:  Regular rate and rhythm    No pathologic murmur   Resp:  Breath sounds are clear bilaterally.  Occasional cough, dry    Non-labored, no retractions or accessory muscle use  GI:  Abdomen is soft, no distension, no tenderness. No peritoneal signs  MS:  No lower extremity edema   Skin:  Warm and dry, No rash or lesions noted.  Neuro: Oriented x 3. No gross motor deficits.      Emergency Department Course     Imaging:  Chest XR,  PA & LAT   Final Result   IMPRESSION: Negative chest. No interval change.           Laboratory:  Labs Ordered and Resulted from Time of ED Arrival to Time of ED Departure   INFLUENZA A/B, RSV, & SARS-COV2 PCR - Normal       Result Value    Influenza A PCR Negative      Influenza B PCR Negative      RSV PCR Negative       SARS CoV2 PCR Negative         Emergency Department Course & Assessments:    Interventions:  Medications - No data to display       Independent Interpretation (X-rays, CTs, rhythm strip):  X-ray: No infiltrate, effusion, pneumothorax    Consultations/Discussion of Management or Tests:  None        Social Determinants of Health affecting care:   None    Disposition:  The patient was discharged to home.     Impression & Plan      Medical Decision Making:  Henry Bautista is a 33 year old female who presents for evaluation of 2-week history of cough and subjective fever.  This is consistent with an upper respiratory tract infection.  There is no signs at this point of serious bacterial infection such as OM, RPA, epiglottitis, PTA, strep pharyngitis, pneumonia, sinusitis, meningitis, bacteremia, serious bacterial infection.  Given chronicity of symptoms, I did a CXR to eval for pneumonia; imaging unremarkable.  COVID and influenza testing negative.  At this time presentation consistent with viral URI.  Recommended continued supportive care and close follow-up with PCP as needed.  Return precautions discussed.  Patient discharged home.    Diagnosis:    ICD-10-CM    1. Upper respiratory tract infection, unspecified type  J06.9                 Ramirez Michelle,   11/03/23 1752

## 2023-11-03 NOTE — ED TRIAGE NOTES
Cough for 2 weeks. Using OTC meds. Feels feverish but does not use thermometer. No previous covid or influenza tests     Triage Assessment (Adult)       Row Name 11/03/23 8698          Triage Assessment    Airway WDL WDL        Respiratory WDL    Respiratory WDL X;cough     Cough Frequency frequent     Cough Type dry        Cardiac WDL    Cardiac WDL WDL        Peripheral/Neurovascular WDL    Peripheral Neurovascular WDL WDL        Cognitive/Neuro/Behavioral WDL    Cognitive/Neuro/Behavioral WDL WDL

## 2023-11-04 ENCOUNTER — NURSE TRIAGE (OUTPATIENT)
Dept: NURSING | Facility: CLINIC | Age: 33
End: 2023-11-04
Payer: COMMERCIAL

## 2023-11-04 ENCOUNTER — APPOINTMENT (OUTPATIENT)
Dept: CT IMAGING | Facility: CLINIC | Age: 33
End: 2023-11-04
Attending: EMERGENCY MEDICINE
Payer: COMMERCIAL

## 2023-11-04 ENCOUNTER — HOSPITAL ENCOUNTER (EMERGENCY)
Facility: CLINIC | Age: 33
Discharge: HOME | End: 2023-11-04
Attending: EMERGENCY MEDICINE | Admitting: EMERGENCY MEDICINE
Payer: COMMERCIAL

## 2023-11-04 VITALS
OXYGEN SATURATION: 99 % | HEART RATE: 79 BPM | BODY MASS INDEX: 44.92 KG/M2 | TEMPERATURE: 98.2 F | RESPIRATION RATE: 24 BRPM | HEIGHT: 65 IN | SYSTOLIC BLOOD PRESSURE: 108 MMHG | WEIGHT: 269.62 LBS | DIASTOLIC BLOOD PRESSURE: 61 MMHG

## 2023-11-04 DIAGNOSIS — R07.9 CHEST PAIN, UNSPECIFIED TYPE: ICD-10-CM

## 2023-11-04 DIAGNOSIS — L50.9 HIVES: ICD-10-CM

## 2023-11-04 LAB
ALBUMIN UR-MCNC: 30 MG/DL
ANION GAP SERPL CALCULATED.3IONS-SCNC: 13 MMOL/L (ref 7–15)
APPEARANCE UR: CLEAR
BASOPHILS # BLD AUTO: 0 10E3/UL (ref 0–0.2)
BASOPHILS NFR BLD AUTO: 0 %
BILIRUB UR QL STRIP: ABNORMAL
BUN SERPL-MCNC: 9.6 MG/DL (ref 6–20)
CALCIUM SERPL-MCNC: 9 MG/DL (ref 8.6–10)
CHLORIDE SERPL-SCNC: 103 MMOL/L (ref 98–107)
COLOR UR AUTO: YELLOW
CREAT SERPL-MCNC: 0.86 MG/DL (ref 0.51–0.95)
DEPRECATED HCO3 PLAS-SCNC: 22 MMOL/L (ref 22–29)
EGFRCR SERPLBLD CKD-EPI 2021: 90 ML/MIN/1.73M2
EOSINOPHIL # BLD AUTO: 0.3 10E3/UL (ref 0–0.7)
EOSINOPHIL NFR BLD AUTO: 3 %
ERYTHROCYTE [DISTWIDTH] IN BLOOD BY AUTOMATED COUNT: 14.3 % (ref 10–15)
GLUCOSE SERPL-MCNC: 120 MG/DL (ref 70–99)
GLUCOSE UR STRIP-MCNC: NEGATIVE MG/DL
HCG UR QL: NEGATIVE
HCT VFR BLD AUTO: 42.3 % (ref 35–47)
HGB BLD-MCNC: 13.3 G/DL (ref 11.7–15.7)
HGB UR QL STRIP: NEGATIVE
HOLD SPECIMEN: NORMAL
HOLD SPECIMEN: NORMAL
IMM GRANULOCYTES # BLD: 0 10E3/UL
IMM GRANULOCYTES NFR BLD: 0 %
KETONES UR STRIP-MCNC: 10 MG/DL
LEUKOCYTE ESTERASE UR QL STRIP: NEGATIVE
LIPASE SERPL-CCNC: 61 U/L (ref 13–60)
LYMPHOCYTES # BLD AUTO: 2.6 10E3/UL (ref 0.8–5.3)
LYMPHOCYTES NFR BLD AUTO: 25 %
MCH RBC QN AUTO: 28.3 PG (ref 26.5–33)
MCHC RBC AUTO-ENTMCNC: 31.4 G/DL (ref 31.5–36.5)
MCV RBC AUTO: 90 FL (ref 78–100)
MONOCYTES # BLD AUTO: 0.5 10E3/UL (ref 0–1.3)
MONOCYTES NFR BLD AUTO: 5 %
MUCOUS THREADS #/AREA URNS LPF: PRESENT /LPF
NEUTROPHILS # BLD AUTO: 7 10E3/UL (ref 1.6–8.3)
NEUTROPHILS NFR BLD AUTO: 67 %
NITRATE UR QL: NEGATIVE
NRBC # BLD AUTO: 0 10E3/UL
NRBC BLD AUTO-RTO: 0 /100
NT-PROBNP SERPL-MCNC: 115 PG/ML (ref 0–450)
PH UR STRIP: 6 [PH] (ref 5–7)
PLATELET # BLD AUTO: 335 10E3/UL (ref 150–450)
POTASSIUM SERPL-SCNC: 3.3 MMOL/L (ref 3.4–5.3)
RBC # BLD AUTO: 4.7 10E6/UL (ref 3.8–5.2)
RBC URINE: 2 /HPF
SODIUM SERPL-SCNC: 138 MMOL/L (ref 135–145)
SP GR UR STRIP: 1.01 (ref 1–1.03)
SQUAMOUS EPITHELIAL: 2 /HPF
TROPONIN T SERPL HS-MCNC: 6 NG/L
UROBILINOGEN UR STRIP-MCNC: 4 MG/DL
WBC # BLD AUTO: 10.5 10E3/UL (ref 4–11)
WBC URINE: 3 /HPF

## 2023-11-04 PROCEDURE — 85025 COMPLETE CBC W/AUTO DIFF WBC: CPT | Performed by: EMERGENCY MEDICINE

## 2023-11-04 PROCEDURE — 96374 THER/PROPH/DIAG INJ IV PUSH: CPT | Mod: 59

## 2023-11-04 PROCEDURE — 80048 BASIC METABOLIC PNL TOTAL CA: CPT | Performed by: EMERGENCY MEDICINE

## 2023-11-04 PROCEDURE — 83880 ASSAY OF NATRIURETIC PEPTIDE: CPT | Performed by: EMERGENCY MEDICINE

## 2023-11-04 PROCEDURE — 99285 EMERGENCY DEPT VISIT HI MDM: CPT | Mod: 25

## 2023-11-04 PROCEDURE — 84484 ASSAY OF TROPONIN QUANT: CPT | Performed by: EMERGENCY MEDICINE

## 2023-11-04 PROCEDURE — 250N000009 HC RX 250: Performed by: EMERGENCY MEDICINE

## 2023-11-04 PROCEDURE — 96375 TX/PRO/DX INJ NEW DRUG ADDON: CPT

## 2023-11-04 PROCEDURE — 250N000011 HC RX IP 250 OP 636: Performed by: EMERGENCY MEDICINE

## 2023-11-04 PROCEDURE — 74177 CT ABD & PELVIS W/CONTRAST: CPT

## 2023-11-04 PROCEDURE — 81025 URINE PREGNANCY TEST: CPT | Performed by: EMERGENCY MEDICINE

## 2023-11-04 PROCEDURE — 36415 COLL VENOUS BLD VENIPUNCTURE: CPT | Performed by: EMERGENCY MEDICINE

## 2023-11-04 PROCEDURE — 83690 ASSAY OF LIPASE: CPT | Performed by: EMERGENCY MEDICINE

## 2023-11-04 PROCEDURE — 81001 URINALYSIS AUTO W/SCOPE: CPT | Performed by: EMERGENCY MEDICINE

## 2023-11-04 PROCEDURE — 93005 ELECTROCARDIOGRAM TRACING: CPT

## 2023-11-04 PROCEDURE — 250N000011 HC RX IP 250 OP 636: Mod: JZ | Performed by: EMERGENCY MEDICINE

## 2023-11-04 PROCEDURE — 96361 HYDRATE IV INFUSION ADD-ON: CPT

## 2023-11-04 PROCEDURE — 258N000003 HC RX IP 258 OP 636: Performed by: EMERGENCY MEDICINE

## 2023-11-04 RX ORDER — METHYLPREDNISOLONE SODIUM SUCCINATE 125 MG/2ML
125 INJECTION, POWDER, LYOPHILIZED, FOR SOLUTION INTRAMUSCULAR; INTRAVENOUS ONCE
Status: COMPLETED | OUTPATIENT
Start: 2023-11-04 | End: 2023-11-04

## 2023-11-04 RX ORDER — DIPHENHYDRAMINE HYDROCHLORIDE 50 MG/ML
25 INJECTION INTRAMUSCULAR; INTRAVENOUS ONCE
Status: COMPLETED | OUTPATIENT
Start: 2023-11-04 | End: 2023-11-04

## 2023-11-04 RX ORDER — IOPAMIDOL 755 MG/ML
500 INJECTION, SOLUTION INTRAVASCULAR ONCE
Status: COMPLETED | OUTPATIENT
Start: 2023-11-04 | End: 2023-11-04

## 2023-11-04 RX ADMIN — SODIUM CHLORIDE 51 ML: 9 INJECTION, SOLUTION INTRAVENOUS at 18:47

## 2023-11-04 RX ADMIN — DIPHENHYDRAMINE HYDROCHLORIDE 25 MG: 50 INJECTION, SOLUTION INTRAMUSCULAR; INTRAVENOUS at 19:07

## 2023-11-04 RX ADMIN — IOPAMIDOL 75 ML: 755 INJECTION, SOLUTION INTRAVENOUS at 18:47

## 2023-11-04 RX ADMIN — METHYLPREDNISOLONE SODIUM SUCCINATE 125 MG: 125 INJECTION, POWDER, FOR SOLUTION INTRAMUSCULAR; INTRAVENOUS at 19:08

## 2023-11-04 RX ADMIN — SODIUM CHLORIDE 1000 ML: 9 INJECTION, SOLUTION INTRAVENOUS at 19:35

## 2023-11-04 RX ADMIN — FAMOTIDINE 20 MG: 10 INJECTION, SOLUTION INTRAVENOUS at 19:08

## 2023-11-04 ASSESSMENT — ACTIVITIES OF DAILY LIVING (ADL)
ADLS_ACUITY_SCORE: 33
ADLS_ACUITY_SCORE: 35

## 2023-11-04 NOTE — ED PROVIDER NOTES
"  History     Chief Complaint:  Chest Pain       HPI   Siddharth Fontanez is a 33 year old female status 3 weeks post gastric bypass who presents to the emergency department for evaluation of midsternal chest pain and abdominal pain. Patient reports that the chest pain began this morning and is accompanied by lightheadedness that is worse with movement. No syncope. The abdominal pain began last night and woke her up. Pain was mostly in the lower abdomen. Notes that the surgical incisions have been healing well. 4 week surgical follow up is scheduled for 11/11. No history of other abdominal surgeries. Her last period was 2 weeks ago. She is on oral birth control. She has been experiencing constipation and is on Senna. No history of heart problems or blood clots. No swelling or pain of the lower extremities. No vaginal symptoms. Patient denies shortness of breath, fever, or vomiting.     Independent Historian:   None - Patient Only    Review of External Notes:   I reviewed the discharge summary from gastric bypass surgery, 10/10/2023.     Medications:    Apri  Prilosec  Senna  Topamax  Juleber    Past Medical History:    Irregular periods  Migraine  LGSIL    Past Surgical History:    Gastric bypass  Tonsillectomy    Physical Exam   Patient Vitals for the past 24 hrs:   BP Temp Temp src Pulse Resp SpO2 Height Weight   11/04/23 2050 108/61 -- -- -- 24 -- -- --   11/04/23 2045 -- -- -- 79 24 99 % -- --   11/04/23 1915 101/62 -- -- 66 11 100 % -- --   11/04/23 1815 -- -- -- 67 13 100 % -- --   11/04/23 1730 95/49 -- -- 76 28 -- -- --   11/04/23 1525 130/81 98.2  F (36.8  C) Oral 92 20 99 % 1.651 m (5' 5\") 122.3 kg (269 lb 10 oz)        Physical Exam  Vitals reviewed.   Constitutional:       General: She is not in acute distress.     Appearance: She is not ill-appearing.   HENT:      Head: Normocephalic and atraumatic.   Eyes:      Extraocular Movements: Extraocular movements intact.   Cardiovascular:      Rate and Rhythm: Normal " rate and regular rhythm.   Pulmonary:      Effort: Pulmonary effort is normal. No respiratory distress.      Breath sounds: Normal breath sounds. No wheezing.   Abdominal:      Palpations: Abdomen is soft.      Tenderness: There is no abdominal tenderness. There is no guarding.      Comments: Surgical incisions appear clean dry and intact.   Musculoskeletal:      Cervical back: Normal range of motion.   Skin:     General: Skin is warm and dry.   Neurological:      Mental Status: She is alert and oriented to person, place, and time.      GCS: GCS eye subscore is 4. GCS verbal subscore is 5. GCS motor subscore is 6.   Psychiatric:         Behavior: Behavior normal.           Emergency Department Course   ECG  ECG results from 11/04/23   EKG 12-lead, tracing only     Value    Systolic Blood Pressure     Diastolic Blood Pressure     Ventricular Rate 84    Atrial Rate 84    NV Interval 166    QRS Duration 88        QTc 425    P Axis 40    R AXIS 20    T Axis 14    Interpretation ECG      Sinus rhythm  Minimal voltage criteria for LVH, may be normal variant  Borderline ECG  When compared with ECG of 31-OCT-2018 19:30,  No significant change was found  Interpreted by Xiomara Lu DO at 1549.         Imaging:  CT Chest (PE) Abdomen Pelvis w Contrast   Final Result   IMPRESSION:   1.  No evidence of pulmonary embolus.   2.  Distended gastric pouch status post gastric bypass. Anastomosis appears patent. No associated inflammatory stranding or fluid collections.   3.  Additional findings as above.           Laboratory:  Labs Ordered and Resulted from Time of ED Arrival to Time of ED Departure   BASIC METABOLIC PANEL - Abnormal       Result Value    Sodium 138      Potassium 3.3 (*)     Chloride 103      Carbon Dioxide (CO2) 22      Anion Gap 13      Urea Nitrogen 9.6      Creatinine 0.86      GFR Estimate 90      Calcium 9.0      Glucose 120 (*)    CBC WITH PLATELETS AND DIFFERENTIAL - Abnormal    WBC Count 10.5       RBC Count 4.70      Hemoglobin 13.3      Hematocrit 42.3      MCV 90      MCH 28.3      MCHC 31.4 (*)     RDW 14.3      Platelet Count 335      % Neutrophils 67      % Lymphocytes 25      % Monocytes 5      % Eosinophils 3      % Basophils 0      % Immature Granulocytes 0      NRBCs per 100 WBC 0      Absolute Neutrophils 7.0      Absolute Lymphocytes 2.6      Absolute Monocytes 0.5      Absolute Eosinophils 0.3      Absolute Basophils 0.0      Absolute Immature Granulocytes 0.0      Absolute NRBCs 0.0     LIPASE - Abnormal    Lipase 61 (*)    ROUTINE UA WITH MICROSCOPIC REFLEX TO CULTURE - Abnormal    Color Urine Yellow      Appearance Urine Clear      Glucose Urine Negative      Bilirubin Urine Small (*)     Ketones Urine 10 (*)     Specific Gravity Urine 1.015      Blood Urine Negative      pH Urine 6.0      Protein Albumin Urine 30 (*)     Urobilinogen Urine 4.0 (*)     Nitrite Urine Negative      Leukocyte Esterase Urine Negative      Mucus Urine Present (*)     RBC Urine 2      WBC Urine 3      Squamous Epithelials Urine 2 (*)    TROPONIN T, HIGH SENSITIVITY - Normal    Troponin T, High Sensitivity 6     NT PROBNP INPATIENT - Normal    N terminal Pro BNP Inpatient 115     HCG QUALITATIVE URINE - Normal    hCG Urine Qualitative Negative          Emergency Department Course & Assessments:         Interventions:  Medications   sodium chloride for CT scan flush use (51 mLs Intravenous $Given 11/4/23 1847)   iopamidol (ISOVUE-370) solution 500 mL (75 mLs Intravenous $Given 11/4/23 1847)   diphenhydrAMINE (BENADRYL) injection 25 mg (25 mg Intravenous $Given 11/4/23 1907)   methylPREDNISolone sodium succinate (solu-MEDROL) injection 125 mg (125 mg Intravenous $Given 11/4/23 1908)   famotidine (PEPCID) injection 20 mg (20 mg Intravenous $Given 11/4/23 1908)   sodium chloride 0.9% BOLUS 1,000 mL (0 mLs Intravenous Stopped 11/4/23 2042)        Assessments:  1706 I obtained history and examined the patient as noted  above.  1936 I rechecked and updated the patient.   The patient is comfortable with plan for discharge.    Independent Interpretation (X-rays, CTs, rhythm strip):  None    Consultations/Discussion of Management or Tests:  None   ED Course as of 23 2108   Sat 2023   1703 Patient had gastric bypass surgery 10/10/2023.  Reviewed discharge summary.   1704 Pulse: 92   1908 Troponin T, High Sensitivity: 6    Hemoglobin: 13.3       Social Determinants of Health affecting care:   None    Disposition:  The patient was discharged to home.     Impression & Plan        Medical Decision Makin-year-old female presenting today with chest pain and lower abdominal pain.  She is significant recent history of gastric bypass surgery done in October.  She states that today she developed right-sided chest pain.  States it is worse with deep inspiration.  She is also on OCPs.  Discussed with patient my concerns for possible pulmonary embolism given risk factors including recent surgery and OCP use.  Discussed plan for CAT scan.  She agrees with plan of care.  EKG, troponin ordered.  Troponin within normal limits.  Her CT scan showed no pulmonary embolism.  CT abdomen pelvis showed no acute intra-abdominal process.  When patient returned to CAT scan she developed hives.  No shortness of breath.  No facial or oral swelling.  She was given Solu-Medrol, Benadryl and Pepcid.  The hives resolved.  She was monitored in the ER.  Hemodynamically stable.  I discussed at length with her her results and plan for discharge home.  Discussed continued supportive care at home.  Discussed return precautions at length with her.  She verbalized understanding and agreement.  Patient was instructed to follow-up with her primary care doctor and keep her appointment with her surgeon as scheduled.      Diagnosis:    ICD-10-CM    1. Chest pain, unspecified type  R07.9       2. Hives  L50.9            Discharge Medications:  Discharge  Medication List as of 11/4/2023  8:42 PM             Scribe Disclosure:  I, Harini Tejeda, am serving as a scribe at 6:02 PM on 11/4/2023 to document services personally performed by Xiomara Lu DO based on my observations and the provider's statements to me.   11/4/2023   Xiomara Lu DO Doan, Tiffani, DO  11/04/23 2118

## 2023-11-04 NOTE — TELEPHONE ENCOUNTER
Nurse Triage SBAR    Is this a 2nd Level Triage? YES, LICENSED PRACTITIONER REVIEW IS REQUIRED    Situation: Pain in chest under right breast      Assessment:Patient is having increased pain under right breast in chest when she is breathing, is able to take deep breath but is painful  had gastric bypass surgery 10/10, is feeling lightheaded since surgery when getting up or moving around,     Protocol Recommended Disposition:   Go to ED Now, See More Appropriate Guideline Will have family take her to ED at [facility]    Karen Pichardo RN on 11/4/2023 at 2:29 PM            Reason for Disposition   Chest pain   Major surgery in past month    Additional Information   Negative: SEVERE difficulty breathing (e.g., struggling for each breath, speaks in single words)   Negative: [1] Breathing stopped AND [2] hasn't returned   Negative: Choking on something   Negative: Bluish (or gray) lips or face now   Negative: Difficult to awaken or acting confused (e.g., disoriented, slurred speech)   Negative: Passed out (i.e., lost consciousness, collapsed and was not responding)   Negative: Wheezing started suddenly after medicine, an allergic food or bee sting   Negative: Stridor (harsh sound while breathing in)   Negative: Slow, shallow and weak breathing   Negative: Sounds like a life-threatening emergency to the triager   Negative: SEVERE difficulty breathing (e.g., struggling for each breath, speaks in single words)   Negative: Shock suspected (e.g., cold/pale/clammy skin, too weak to stand, low BP, rapid pulse)   Negative: Passed out (i.e., lost consciousness, collapsed and was not responding)   Negative: Chest pain lasting longer than 5 minutes and ANY of the following:    history of heart disease  (i.e., heart attack, bypass surgery, angina, angioplasty, CHF; not just a heart murmur)    described as crushing, pressure-like, or heavy    age > 50    age > 30 AND at least one cardiac risk factor (i.e., hypertension, diabetes, obesity,  "smoker or strong family history of heart disease)    not relieved with nitroglycerin   Negative: Heart beating < 50 beats per minute OR > 140 beats per minute   Negative: Visible sweat on face or sweat dripping down face   Negative: Sounds like a life-threatening emergency to the triager   Negative: Followed a chest injury   Negative: SEVERE chest pain   Negative: [1] Chest pain (or \"angina\") comes and goes AND [2] is happening more often (increasing in frequency) or getting worse (increasing in severity)  (Exception: Chest pains that last only a few seconds.)   Negative: Pain also in shoulder(s) or arm(s) or jaw  (Exception: Pain is clearly made worse by movement.)   Negative: Difficulty breathing   Negative: Dizziness or lightheadedness   Negative: Coughing up blood   Negative: Cocaine use within last 3 days    Protocols used: Breathing Difficulty-A-AH, Chest Pain-A-AH    "

## 2023-11-04 NOTE — ED TRIAGE NOTES
Pt presents for evaluation of CP that started this morning at 0800. Pt reports that she does have a hx of gastric bypass a few years ago and had some CP like this prior to the surgery but has not had those CP since after the surgery. Pt is alert and oriented, ambulatory, and ABCs intact.

## 2023-11-06 LAB
ATRIAL RATE - MUSE: 84 BPM
DIASTOLIC BLOOD PRESSURE - MUSE: NORMAL MMHG
INTERPRETATION ECG - MUSE: NORMAL
P AXIS - MUSE: 40 DEGREES
PR INTERVAL - MUSE: 166 MS
QRS DURATION - MUSE: 88 MS
QT - MUSE: 360 MS
QTC - MUSE: 425 MS
R AXIS - MUSE: 20 DEGREES
SYSTOLIC BLOOD PRESSURE - MUSE: NORMAL MMHG
T AXIS - MUSE: 14 DEGREES
VENTRICULAR RATE- MUSE: 84 BPM

## 2023-11-10 ENCOUNTER — VIRTUAL VISIT (OUTPATIENT)
Dept: SURGERY | Facility: CLINIC | Age: 33
End: 2023-11-10
Payer: COMMERCIAL

## 2023-11-10 VITALS — BODY MASS INDEX: 44.92 KG/M2 | WEIGHT: 269.63 LBS | HEIGHT: 65 IN

## 2023-11-10 DIAGNOSIS — E66.01 MORBID OBESITY (H): ICD-10-CM

## 2023-11-10 DIAGNOSIS — K91.2 POSTSURGICAL MALABSORPTION: Primary | ICD-10-CM

## 2023-11-10 DIAGNOSIS — Z98.84 BARIATRIC SURGERY STATUS: ICD-10-CM

## 2023-11-10 PROCEDURE — 97803 MED NUTRITION INDIV SUBSEQ: CPT | Mod: 95

## 2023-11-10 NOTE — PATIENT INSTRUCTIONS
"Macario Messina!        Great chatting with you today! Here's a summary of your next diet stage (Stage 4). You ll stay in this phase for 2 months:    Your Stage 4 Diet: Soft Foods  [email or web address]     Over the next couple of months:    1. Aim for 600-800 calories  - Also: 60-90g protein and 10-15g fiber     2. Eat 3 food groups at each meal  - 1/2c protein, 1/4c vegetable/fruit, 1/4c fruit/starch  - I'll mail you a new sample meal plan    3.  Continue to eat slowly, chew well, and separate fluids and meals by 30 minutes     4. Limit \"snacks\" to milk or protein drinks     5. Avoid caffeine, carbonation and alcohol    6. Vitamins:    Multivitamin:  - Take two tablets per day.   - These have been prescribed for you ([facility])- please  and start as soon as possible.     Calcium:  - If you cannot do the prescribed tablets, try over-the-counter \"Viactive\" chews (or generic versions).  - Take 1 chew 2x/day. Take separate from your multivitamin and iron by at least 2 hours.     Vitamin D:  - Continue current dose    Vitamin B1:  - These have been prescribed for you ([facility]) - please  and start as soon as possible.     Vitamin B12:  - This has been prescribed for you ([facility]) - please  and start as soon as possible.       Sample Schedule:  - Mornings: Multivitamins, Iron, Vitamin D, Vitamin B12 and B1   - Lunch: first Calcium dose  - Dinner: second Calcium dose                  Plan on following up in 2 months. This can be scheduled via our call center at . Of course, reach out sooner with any questions or concerns. Have a great day!           Nithya Walker, RD, LD  Clinical Dietitian       Bowel Regimen:    1. 4 oz Prune juice or Plum juice daily for constipation - dilute with equal amount water    2. Ducosate Sodium 1 pill twice daily (stool softener)   - examples: Colace      3. Miralax 1 scoop/packet  up to 1-2 times as needed daily constipation ?  - May take up to 1 week to " work    4. Milk of Magnesia as needed 1x weekly.    - Do not use daily!

## 2023-11-10 NOTE — PROGRESS NOTES
"Siddharth is a 33 year old who is being evaluated via a billable video visit.      How would you like to obtain your AVS? MyChart  If the video visit is dropped, the invitation should be resent by: Text to cell phone: [phone]  Will anyone else be joining your video visit? No          Video-Visit Details    Type of service:  Video Visit   Video Start Time:  1:29pm  Video End Time: 2:00pm    Originating Location (pt. Location): Home    Distant Location (provider location):  On-site  Platform used for Video Visit: [facility]       BARIATRIC PROGRESS NOTE - 4 Week Post Op  DATE OF VISIT: November 10, 2023    Siddharth Fontanez  1990  female  [id number]  33 year old    ASSESSMENT:    REASON FOR VISIT:  Siddharth Fontanez is a 33 year old year old female presents today for a 4 Week Post Op nutrition follow-up appointment. Patient is accompanied by self      DIAGNOSIS:  Status: post gastric bypass surgery.   Obesity Grade III BMI >40    ANTHROPOMETRICS:  Height: 65\"    Initial weight: 316 lbs    Current Weight: 269 lbs 10 oz    BMI: Body mass index is 44.87 kg/m .    VITAMINS AND MINERALS: (**all prescribed)  5000 International units Vitamin D  2 Vitron C    NUTRITION HISTORY:  Tolerating diet: Bariatric pureed diet  Fluids/water intake: 60 ounces/day (water, enhanced water, SF sports drinks, protein water)  Small bites: Yes  Portion size: 1/2c or less  20-30 minute meals: Yes  Fluids and meals  by 30 minutes: Yes  Chew foods thoroughly: Yes  Breakfast: cream of wheat  protein shake (premier or fair life)   Lunch: cream of wheat  vegetable/potato soup (not pureed)  pureed soup vegetable soup + tofu   Dinner: yogurt  kefir w/added honey   Snacks: popsicles, yogurt  Nausea: occasional (dumping syndrome)   Vomiting: none  Constipation: yes  Additional Information: Pt c/o sour taste - discussed changes in taste perception following surgery. Warned of signs/symptoms of thrush. Pt reports constipation - bowel regimen sent. "       PHYSICAL ACTIVITY:  Type: walking  Frequency: 5 days a week.  Duration: 20-30 minutes (depends on back pain)    DIAGNOSIS:   Previous Nutrition Diagnosis: Altered gastrointestinal function related to alternation in gastrointestinal structure as evidenced by history of gastric bypass.   No change    Previous Goals:  Slowly start: 2 MVI, 100 mg Thiamine 1X per week, 500 mcg vitamin B-12, sublingual - not met  Slowly restart: vitamin D, iron, calcium with vitamin D (take at least 2 hours away from multivitamin/ mineral) - not met  Start puree diet at day 14 post op. - met  Aim for 60 to 90 grams protein. - met  Continue 48 to 64 oz of fluid per day. - met    Current Nutrition Diagnosis: Altered gastrointestinal function related to alternation in gastrointestinal structure as evidenced by history of gastric bypass.     INTERVENTION  Nutrition Prescription: Recommended bariatric soft diet.    Goals:  Start soft diet at day 28 post op.  Start MVI, calcium with vitamin D, vitamin B12 and B1, vitamin D, and  iron with vitamin C.  Aim for 60 to 90 grams protein.  Continue 48 to 64 oz of fluid per day.    Implementation:  Discussed transition to soft diet.  Emphasized importance of adequate protein.  Reviewed required vitamins and mineral supplements.  Verbalizes fair-good understanding of surgery diet guidelines.  Assessed learning needs and learning preferences.      NUTRITION MONITORING AND EVALUATION:   Monitor diet tolerance and weight loss.      Anticipated Compliance: fair-good  Follow Up: Continue to monitor patient closely regarding weight loss and diet.  At 3 months Post Op    TIME SPENT WITH PATIENT: 31 minutes      Nithya Walker RD, LD  Clinical Dietitian

## 2023-11-14 ENCOUNTER — MYC REFILL (OUTPATIENT)
Dept: SURGERY | Facility: CLINIC | Age: 33
End: 2023-11-14
Payer: COMMERCIAL

## 2023-11-14 DIAGNOSIS — K59.09 CHRONIC CONSTIPATION: ICD-10-CM

## 2023-11-14 RX ORDER — SENNA AND DOCUSATE SODIUM 50; 8.6 MG/1; MG/1
2 TABLET, FILM COATED ORAL 2 TIMES DAILY
Qty: 120 TABLET | Refills: 1 | Status: SHIPPED | OUTPATIENT
Start: 2023-11-14 | End: 2024-04-12

## 2023-11-14 NOTE — TELEPHONE ENCOUNTER
Pt deals with constipation on a chronic basis.  Next appt 1/22/24  Can refill until next appt.    Jasmin MCCALLUM RN

## 2023-11-15 NOTE — PROGRESS NOTES
Pt presents to clarify vitamin/mineral supplements and regimen. All products are prescribed as follows:      - Multivitamin: 2 tablets per day  - Calcium: 600mg BID  - Vitamin D: 5000 international unit(s) per day  - Vitamin B1: 100mg q7d  - Vitamin B12: 1000mcg tablet per day    Pt reports previous Rx for Iron although not able to see this in records.     Clarified dosing to reflect instructions as above. Will reach out to provider to prescribe iron supplement.     Pt with additional questions on Senna, Omeprazole, birth control and Topiramate; recommended follow up with provider.    Pt with questions on refills; will route to provider.       Nithya Walker RD, LD  Clinical Dietitian    Visit Duration: 21 minutes

## 2023-11-16 ENCOUNTER — VIRTUAL VISIT (OUTPATIENT)
Dept: SURGERY | Facility: CLINIC | Age: 33
End: 2023-11-16
Payer: COMMERCIAL

## 2023-11-16 ENCOUNTER — TELEPHONE (OUTPATIENT)
Dept: SURGERY | Facility: CLINIC | Age: 33
End: 2023-11-16

## 2023-11-16 DIAGNOSIS — Z98.84 BARIATRIC SURGERY STATUS: ICD-10-CM

## 2023-11-16 DIAGNOSIS — K91.2 POSTSURGICAL MALABSORPTION: Primary | ICD-10-CM

## 2023-11-16 DIAGNOSIS — E66.01 MORBID OBESITY (H): Primary | ICD-10-CM

## 2023-11-16 PROCEDURE — 97803 MED NUTRITION INDIV SUBSEQ: CPT | Mod: 95

## 2023-11-16 NOTE — PATIENT INSTRUCTIONS
Macario Messina!      Here's the plan we discussed:    Multivitamin (TAB a VIT w/Iron)   - Take 2 tablets per day. Both tablets can be at the same time    Calcium (600mg)  - Take 1 tablet twice daily, separate from your multivitamin/iron    Vitamin D (5000 international unit(s))  - Take 1 tablet once daily    Vitamin B1 (Thiamin; 100mg)  - Take 1 tablet once WEEKLY (every 7 days)    Vitamin B12 (1000mcg)  - Take 1 tablet once daily    Iron  - I have requested that your provider submit a prescription for this.       Sample Schedules:    Option 1:    Morning:  - Multivitamin  - Vitamin D  - Vitamin B1  - Vitamin B12    Lunch:   - Calcium    Dinner:  - Calcium       Option 2:    Morning:  - Calcium    Dinner:  - Calcium    Bedtime:  - Multivitamin  - Vitamin D  - Vitamin B1  - Vitamin B12          Discuss with provider:  - Omeprazole  - Senna  - Topamax    Discuss with PCP:  - Birth Control

## 2023-11-17 ENCOUNTER — TELEPHONE (OUTPATIENT)
Dept: SURGERY | Facility: CLINIC | Age: 33
End: 2023-11-17
Payer: COMMERCIAL

## 2023-11-17 DIAGNOSIS — K91.2 POSTSURGICAL MALABSORPTION: ICD-10-CM

## 2023-11-17 RX ORDER — LANOLIN ALCOHOL/MO/W.PET/CERES
100 CREAM (GRAM) TOPICAL WEEKLY
Qty: 5 TABLET | Refills: 11 | Status: SHIPPED | OUTPATIENT
Start: 2023-11-17

## 2024-01-06 DIAGNOSIS — N93.9 ABNORMAL UTERINE BLEEDING: ICD-10-CM

## 2024-01-08 RX ORDER — DESOGESTREL AND ETHINYL ESTRADIOL 0.15-0.03
1 KIT ORAL DAILY
Qty: 84 TABLET | Refills: 0 | Status: SHIPPED | OUTPATIENT
Start: 2024-01-08 | End: 2024-04-12

## 2024-01-08 NOTE — TELEPHONE ENCOUNTER
Requested Prescriptions   Pending Prescriptions Disp Refills    JULEBER 0.15-30 MG-MCG tablet [Pharmacy Med Name: JULEBER 0.15-30MG-MCG TABS] 84 tablet 3     Sig: TAKE 1 TABLET BY MOUTH DAILY       Contraceptives Protocol Passed - 1/6/2024 11:25 AM        Passed - Patient is not a current smoker if age is 35 or older        Passed - Recent (12 mo) or future (30 days) visit within the authorizing provider's specialty     The patient must have completed an in-person or virtual visit within the past 12 months or has a future visit scheduled within the next 90 days with the authorizing provider s specialty.  Urgent care and e-visits do not quality as an office visit for this protocol.          Passed - Medication is active on med list        Passed - No active pregnancy on record        Passed - No positive pregnancy test in past 12 months           Filled.    Crystal HUDSON RN BSN

## 2024-01-13 NOTE — PROGRESS NOTES
"NUTRITION POST OP APPOINTMENT  DATE OF VISIT: January 13, 2024    Siddharth Fontanez  1990  female  [id number]  34 year old     ASSESSMENT:    REASON FOR VISIT:  Siddharth is a 34 year old year old female presents today for 3 month PO nutrition follow-up appointment. Patient is accompanied by self.    DIAGNOSIS:  Status post gastric bypass surgery.   Obesity Grade III BMI >40     ANTHROPOMETRICS:  Initial Weight: 316 lb   Height: 65\"   Current Weight: 257.6 lb  with boots on   BMI:  42.87 kg/(m^2).    VITAMINS AND MINERALS:  Prescribed by provider  2 Multivitamin with Minerals-AM  600 mg Calcium With Vitamin D BID (4 pm/ bed time)  5000 International units Vitamin D  1000 mcg Vitamin B-12 sublingual  65 mg ferrous fumarate-AM  100 mg Thiamine one time per week      NUTRITION HISTORY:  Breakfast: boiled egg,  steamed veggies or avocado toast with tomato, onion, cilantro, feta cheese or yogurt with berries, + honey  Lunch: 1/3 cup tuna light mcintosh, cucumber/ tomato on wheat bread  Supper: oatmeal + fruit + plain yogurt or 1 cup Kefir + 1% milk or chicken stir oleary with rice  Snacks: protein drink some days  Fluids consumed: 48-64 oz water or enhanced water, 50 calorie coconut water/ 12 oz, protein drink, plain or fruit flavored Kefir diluted with 1% milk  Consuming liquid calories: Yes  Protein intake: 20-30 grams/day  Tolerate regular texture food: Yes  Any foods not tolerated details: No  Portion size: 1/2-1 cup  Take 20-30 minutes to consume each meal: Yes   Eat protein foods first: No  Fluids and meals separate by at least 30 minutes: Yes  Chew foods thoroughly: Yes  Tolerating diet: Yes  Drinking high protein supplements: Yes  Consuming snacks per day: 0-1  Additional Information: Patient is pleased with weight loss so far and would like to maximize weight loss. Stressed the importance of avoiding sweets, sweet drinks, honey.      PHYSICAL ACTIVITY:  Type: walks on treadmill or stationary bike   Frequency (days per week): " 3-4  Duration (min): 30    DIAGNOSIS:  Previous Nutrition Diagnosis: Altered gastrointestinal function related to alteration in gastrointestinal structure as evidenced by history of gastric bypass surgery.- no change    Previous goals:  Start soft diet at day 28 post op.-met  Start MVI, calcium with vitamin D, vitamin B12 and B1, vitamin D, and  iron with vitamin C-met  Aim for 60 to 90 grams protein-not met  Continue 48 to 64 oz of fluid per day-met       Current Nutrition Diagnosis: Altered gastrointestinal function related to alteration in gastrointestinal structure as evidenced by history of gastric bypass surgery.    INTERVENTION:   Nutrition Prescription: Eat 3 meals a day at regular intervals. Consume 60-90 grams of protein daily. Follow post-surgical vitamin and mineral protocol.  Assessed learning needs and learning preferences. Provided: Keeping Up Your Diet after Weight Loss Surgery, Food Label: The 10-10 Rule    GOALS:  Select food with < than 10 grams of total sugar or total fat per serving  Eat protein at every meal and aim for at lest 60 grams of protein (pt has list of high protein foods)  Aim to have protein + 2 other food groups      Implementation: Discussed progress toward previous goals; reinforced importance of following bariatric lifestyle changes.    NUTRITION MONITORING AND EVALUATION:  Anticipated compliance: fair  Verbalized fair understanding.    Follow up: Patient to follow up in 3 months.    TIME SPENT WITH PATIENT:  25 minutes  Oj Boswell RD, MARGUERITE  [facility]

## 2024-01-15 ENCOUNTER — OFFICE VISIT (OUTPATIENT)
Dept: SURGERY | Facility: CLINIC | Age: 34
End: 2024-01-15
Payer: COMMERCIAL

## 2024-01-15 VITALS — BODY MASS INDEX: 42.87 KG/M2 | WEIGHT: 257.6 LBS

## 2024-01-15 DIAGNOSIS — E66.01 MORBID OBESITY (H): Primary | ICD-10-CM

## 2024-01-15 PROCEDURE — 97803 MED NUTRITION INDIV SUBSEQ: CPT

## 2024-01-15 NOTE — PATIENT INSTRUCTIONS
Macario Messina-   It was great to visit with you and learn about your progress. Below are the goals we discussed.  GOALS:  Select food with < than 10 grams of total sugar or total fat per serving  Eat protein at every meal and aim for at lest 60 grams of protein (pt has list of high protein foods)  Aim to have protein + 2 other food groups    Thanks!  Oj Boswell RD, LD  [facility], [place]

## 2024-01-17 NOTE — PROGRESS NOTES
Return Bariatric Surgery Note    RE: Siddharth Fontanez  MR#: [id number]  : 1990  VISIT DATE: 2024    Dear Clinic, [facility],    I had the pleasure of seeing your patient, Siddharth Fontanez, in my post-bariatric surgery assessment clinic.    Assessment & Plan   Problem List Items Addressed This Visit       Morbid obesity (H)     Patient was congratulated on wt loss success thus far. Healthy habits to assist with further weight loss were discussed.            Migraine     Has returned since stopped Topiramate.  Will restart at 50 gm daily.          Relevant Medications    topiramate (TOPAMAX) 50 MG tablet    Postsurgical malabsorption     Continue taking recommended post-op vitamins.  Labs ordered per protocol.           Relevant Orders    Vitamin D Screen    Parathyroid Hormone Intact    Iron and Iron Binding Capacity    Ferritin    Vitamin B12    Bariatric surgery status - Primary     10/10/2023 RYGBS LEL         Slow transit constipation     May be contributing to cramping.              PATIENT INSTRUCTIONS:  Labs ordered. Call [phone] to schedule.  Continue your bariatric vitamins   Switch multivitamin to bedtime.     If iron levels are normal, we can try taking out your iron supplement/vitamin C to see if this helps with the cramping.      Restart Topiramate 50 mg daily to help with headaches.      FOLLOW-UP:  Call [phone] to schedule next visit in 3 months.     30 minutes spent on the date of the encounter doing chart review, history and exam, result review, counseling, developing plan of care, documentation, and further activities as noted        CHIEF COMPLAINT: Post-bariatric surgery follow-up    HISTORY OF PRESENT ILLNESS:      2024     1:09 PM   Questions Regarding Prior Weight Loss Surgery Reviewed With Patient   I had the following weight loss procedure Amairani-en-y Gastric Bypass   What year was your surgery?    How has your weight changed since your last visit? I have stayed  about the same   Do you currently have any of the following None of the above   Do you have any concerns today? N/A     Stomach has been sensitive since surgery.  Still getting used to what she can eat.  Interested to know about a certain type of vitamin..    Is getting stomach cramps. Lower abdominal.  Hx of constipation.  Using Senna.  Not interested in adding any more medication fr constipation.   They can occurs at any time but not every day.   No longer taking omeprazole.  Was still having cramps when on this.  Is sensitive to all types of food except fruit.  Has not vomited since surgery except for today.  Unsure if this is due to eating, vitamins, etc.  Takes most vitamins in the am, eat breakfast about 90 min later.      Weight History:      1/19/2024     1:09 PM   --   What is your highest lifetime weight? 316   What is your lowest weight since surgery? (In pounds) 252     Initial Weight (lbs): 316 lbs  Weight: 249 lb (112.9 kg)  Last Visits Weight: 275 lb (124.7 kg)  Cumulative weight loss (lbs): 67  Weight Loss Percentage: 21.2%    VITAMINS AND MINERALS:  Prescribed by provider  2 Multivitamin with Minerals-AM  600 mg Calcium With Vitamin D BID (4 pm/ bed time)  5000 International units Vitamin D  1000 mcg Vitamin B-12 sublingual  65 mg ferrous fumarate-AM  100 mg Thiamine one time per week           1/19/2024     1:09 PM   Questions Regarding Co-Morbidities and Health Concerns Reviewed With Patient   Pre-diabetes Never   Diabetes II Never   High Blood Pressure Never   High cholesterol Never   Heartburn/Reflux Gone away   Sleep apnea Never   PCOS Never   Back pain Stayed the same   Joint pain Never   Lower leg swelling Never           1/19/2024     1:09 PM   Eating Habits   How many meals do you eat per day? 3   Do you snack between meals? Sometimes   How much food are you eating at each meal? 1/2 cup to 1 cup   Are you able to separate your meals and liquids by at least 30 minutes? Yes   Are you able to  avoid liquid calories? Yes           1/19/2024     1:09 PM   Exercise Questions Reviewed With Patient   How often do you exercise? Daily   What is the duration of your exercise (in minutes)? 30 Minutes   What types of exercise do you do? walking    weightlifting    climbing stairs at work    other   What keeps you from being more active? I am as active as I can possbily be       Social History:      1/19/2024     1:09 PM   --   Are you smoking? No   Are you drinking alcohol? No       Medications:  Current Outpatient Medications   Medication    calcium carbonate-vitamin D (CALCIUM 600 + D) 600-10 MG-MCG per tablet    cholecalciferol 125 MCG (5000 UT) CAPS    cholecalciferol 125 MCG (5000 UT) CAPS    cyanocobalamin (VITAMIN B-12) 1000 MCG tablet    desogestrel-ethinyl estradiol (JULEBER) 0.15-30 MG-MCG tablet    ferrous fumarate 65 mg, [place]. FE,-Vitamin C 125 mg (VITRON C)  MG TABS tablet    Multiple Vitamins-Iron (QC DAILY MULTIVITAMINS/IRON) TABS    SENNA-docusate sodium (SENNA S) 8.6-50 MG tablet    thiamine (B-1) 100 MG tablet    topiramate (TOPAMAX) 50 MG tablet     No current facility-administered medications for this visit.         1/19/2024     1:09 PM   --   Do you avoid NSAIDs such as (Ibuprofen, Aleve, Naproxen, Advil)? Yes       ROS:  GI:       1/19/2024     1:09 PM   --   Vomiting No   Diarrhea No   Constipation Yes, daily 1-2 times a day, or a couple of days,     Swallowing trouble No   Abdominal pain No   Heartburn No   Some abdominal cramping.  Does seem to happen more after eating. Also happens in the morning after she has her vitamins.      Skin:       1/19/2024     1:09 PM   BAR RBS ROS - SKIN   Rash in skin folds No     Psych:       1/19/2024     1:09 PM   --   Depression No   Anxiety No     Female Only:       1/19/2024     1:09 PM   BAR RBS ROS -    Female only None of the above   Stress urinary incontinence No       LABS/IMAGING/MEDICAL RECORDS REVIEW:   Reviewed      PHYSICAL  "EXAMINATION:  /66   Pulse 71   Ht 5' 5\" (1.651 m)   Wt 249 lb (112.9 kg)   SpO2 99%   BMI 41.44 kg/m    GENERAL: Healthy, alert and no distress  RESP: No audible wheeze, cough, or visible cyanosis.  No visible retractions or increased work of breathing.    SKIN: Visible skin clear. No significant rash, abnormal pigmentation or lesions.  PSYCH: Mentation appears normal, affect normal/bright, judgement and insight intact    COUNSELING PROVIDED:  We reviewed the important post op bariatric recommendations:  eating 3 meals daily  eating protein first, getting >60gm protein daily  eating slowly, chewing food well  avoiding/limiting calorie containing beverages  avoiding fluids 30 minutes before, during, and after meals  limiting restaurant or cafeteria eating to twice a week or less  Pt reminded to avoid marginal ulcers she should avoid tobacco at all, alcohol in excess, caffeine, and NSAIDS (unless indicated for cardioprotection or otherwise and opposed by a PPI).  Pt encouraged to establish and maintain a consistent physical activity routine, 6-8 hours of restorative sleep each night and optimal stress management.  Pt counseled on the importance of life long vitamin supplementation and life long follow up.      "

## 2024-01-22 ENCOUNTER — LAB (OUTPATIENT)
Dept: LAB | Facility: CLINIC | Age: 34
End: 2024-01-22
Payer: COMMERCIAL

## 2024-01-22 ENCOUNTER — OFFICE VISIT (OUTPATIENT)
Dept: SURGERY | Facility: CLINIC | Age: 34
End: 2024-01-22
Payer: COMMERCIAL

## 2024-01-22 VITALS
SYSTOLIC BLOOD PRESSURE: 113 MMHG | HEART RATE: 71 BPM | WEIGHT: 249 LBS | BODY MASS INDEX: 41.48 KG/M2 | DIASTOLIC BLOOD PRESSURE: 66 MMHG | OXYGEN SATURATION: 99 % | HEIGHT: 65 IN

## 2024-01-22 DIAGNOSIS — Z98.84 BARIATRIC SURGERY STATUS: Primary | ICD-10-CM

## 2024-01-22 DIAGNOSIS — E66.01 MORBID OBESITY (H): ICD-10-CM

## 2024-01-22 DIAGNOSIS — K59.01 SLOW TRANSIT CONSTIPATION: ICD-10-CM

## 2024-01-22 DIAGNOSIS — K91.2 POSTSURGICAL MALABSORPTION: ICD-10-CM

## 2024-01-22 DIAGNOSIS — G43.919 INTRACTABLE MIGRAINE WITHOUT STATUS MIGRAINOSUS, UNSPECIFIED MIGRAINE TYPE: ICD-10-CM

## 2024-01-22 LAB
FERRITIN SERPL-MCNC: 79 NG/ML (ref 6–175)
IRON BINDING CAPACITY (ROCHE): 353 UG/DL (ref 240–430)
IRON SATN MFR SERPL: 12 % (ref 15–46)
IRON SERPL-MCNC: 41 UG/DL (ref 37–145)
PTH-INTACT SERPL-MCNC: 42 PG/ML (ref 15–65)
VIT B12 SERPL-MCNC: 669 PG/ML (ref 232–1245)
VIT D+METAB SERPL-MCNC: 69 NG/ML (ref 20–50)

## 2024-01-22 PROCEDURE — 82607 VITAMIN B-12: CPT

## 2024-01-22 PROCEDURE — 82306 VITAMIN D 25 HYDROXY: CPT

## 2024-01-22 PROCEDURE — 82728 ASSAY OF FERRITIN: CPT

## 2024-01-22 PROCEDURE — 83970 ASSAY OF PARATHORMONE: CPT

## 2024-01-22 PROCEDURE — 83550 IRON BINDING TEST: CPT

## 2024-01-22 PROCEDURE — 36415 COLL VENOUS BLD VENIPUNCTURE: CPT

## 2024-01-22 PROCEDURE — 99214 OFFICE O/P EST MOD 30 MIN: CPT | Performed by: PHYSICIAN ASSISTANT

## 2024-01-22 RX ORDER — TOPIRAMATE 50 MG/1
50 TABLET, FILM COATED ORAL DAILY
Qty: 90 TABLET | Refills: 1 | Status: SHIPPED | OUTPATIENT
Start: 2024-01-22

## 2024-01-22 NOTE — PATIENT INSTRUCTIONS
"To ensure the quality you may receive a patient satisfaction survey. The greatest compliment you can give is \"Likely to Recommend\"    Nice to talk with you today. Thank you for allowing me the privilege of caring for you. Below is the plan discussed.-  . Kisha Marrero PA-C    Plan:  Labs ordered. Call [phone] to schedule.  Continue your bariatric vitamins   Switch multivitamin to bedtime.     If iron levels are normal, we can try taking out your iron supplement/vitamin C to see if this helps with the cramping.      Restart Topiramate 50 mg daily     FOLLOW-UP:  Call [phone] to schedule next visit in 3 months.     Bariatric Post Op Guidelines  General:    To avoid marginal ulcers avoid all forms of tobacco, alcohol in excess, caffeine, and NSAIDS     Exercise is key for weight loss and weight maintenance. Aim for 30-60 minutes of physical activity most days.  Include cardiovascular and strength training.    Continue lifelong vitamins supplementation and annual lab follow up.  All  patients should supplement with the following bariatric postoperative vitamins:  2 Complete multivitamins with minerals (at different times than calcium)  Vitamin D 5000 Int Units/125 mg daily   Calcium 600 mg twice daily or 500 mg three times daily   Vitamin B12: 500 mcg sl daily or 1000 mcg Inj monthly  B complex daily or Thiamine 100 mg weekly  1 Iron/Vit C. Daily for females who menstruate and/or as directed    The bariatric team should be aware and evaluate all GI symptoms which can be a sign of complications. Inability to tolerate textured solid food (chicken, steak, fish) may need to be evaluated by endoscopy.    There is a 10% increase of Alcohol Use Disorder in patients with bariatric surgery.   Most often occurring around 2 years post op.  Call if you feel alcohol is interfering in your daily life.  We can help.     Follow up annually lifelong. Obesity is a chronic disease.  Weight gain can be expected. The goal of " follow-up visits is to ensure adequate vitamin and protein absorption, evaluate food intake behavior, review exercise/activity level, and assist with weight regain.    Nutritional:  Eat 3 meals per day  (No snacks between meals.)  Do not skip meals.  This can cause overeating at the next meal and will prevent adequate protein and nutritional intake.    Aim for 60-80 grams of protein per day.  Always eat your protein first. This assists with optimal nutrition and helps you stay full longer.    Eat your protein first, and then follow with fiber.    Add fiber by including fruits, vegetables, whole grains, and beans.     Portions should be about 1 cup per meal. Use measuring cups to be accurate.  Continue to use saucer/salad plates, infant/toddler silverware to keep portion sizes small and take small bites.    Eat S-L-O-W-L-Y to make each meal last 20-30 minutes. Always stop eating when satisfied.    Aim for 64 oz. of calorie-free fluids daily.    Avoid drinking 30 min before, during, and 30 min after meal    Avoid high sugar and high fat foods to prevent high calorie intake. This will reduce your rate of weight loss and can cause weight regain.   Check nutrition labels for less than 10 grams of sugar and less than 10 grams of fat per serving.

## 2024-04-09 ENCOUNTER — MYC MEDICAL ADVICE (OUTPATIENT)
Dept: SURGERY | Facility: CLINIC | Age: 34
End: 2024-04-09
Payer: COMMERCIAL

## 2024-04-09 DIAGNOSIS — K59.09 CHRONIC CONSTIPATION: ICD-10-CM

## 2024-04-09 DIAGNOSIS — K91.2 POSTSURGICAL MALABSORPTION: ICD-10-CM

## 2024-04-11 DIAGNOSIS — N93.9 ABNORMAL UTERINE BLEEDING: ICD-10-CM

## 2024-04-11 NOTE — TELEPHONE ENCOUNTER
Patient overdue for annual appointment.  [employer] message sent.    Patria THOMAS RN

## 2024-04-12 ENCOUNTER — TELEPHONE (OUTPATIENT)
Dept: OBGYN | Facility: CLINIC | Age: 34
End: 2024-04-12
Payer: COMMERCIAL

## 2024-04-12 RX ORDER — DESOGESTREL AND ETHINYL ESTRADIOL 0.15-0.03
1 KIT ORAL DAILY
Qty: 84 TABLET | Refills: 0 | Status: SHIPPED | OUTPATIENT
Start: 2024-04-12 | End: 2024-05-07

## 2024-04-12 RX ORDER — SENNA AND DOCUSATE SODIUM 50; 8.6 MG/1; MG/1
2 TABLET, FILM COATED ORAL 2 TIMES DAILY
Qty: 120 TABLET | Refills: 0 | Status: SHIPPED | OUTPATIENT
Start: 2024-04-12

## 2024-04-12 NOTE — TELEPHONE ENCOUNTER
Pt last seen by you 1/22/24 and next 4/19/24.  Is requesting Senna.  Last prescribed 11/14/23 and given 2 month supply.  Plz advise.  Zenaida Sprague, MS, RD, RN

## 2024-04-12 NOTE — TELEPHONE ENCOUNTER
Duplicate.  Just sent in yesterday.  Will refuse per clinic protocol.  Tootie Sprague, MS, RD, RN

## 2024-04-12 NOTE — TELEPHONE ENCOUNTER
Short term refill sent, patient also sent a MyChart message, notified via MyChart message.    Patria THOMAS RN

## 2024-04-12 NOTE — TELEPHONE ENCOUNTER
[facility] Call Center    Phone Message    May a detailed message be left on voicemail: yes     Reason for Call: Medication Refill Request    Has the patient contacted the pharmacy for the refill? Yes   Name of medication being requested: JULEBER 0.15-30 MG-MCG tablet   Provider who prescribed the medication: Dr. Michael Liu  Pharmacy: [id number] [address] AT [facility] [address]   Date medication is needed: ASAP   Pt is out and scheduled an appt to check in on the meds since she had an annual with another clinic. Please send a shyam refill until that appt.     Action Taken: Message routed to:  Other: [facility]    Travel Screening: Not Applicable

## 2024-04-13 NOTE — PROGRESS NOTES
"Siddharth is a 34 year old who is being evaluated via a billable video visit.      The patient has been notified of following:     \"This video visit will be conducted via a call between you and your physician/provider. We have found that certain health care needs can be provided without the need for an in-person physical exam.  This service lets us provide the care you need with a video conversation.  If a prescription is necessary we can send it directly to your pharmacy.  If lab work is needed we can place an order for that and you can then stop by our lab to have the test done at a later time.    Video visits are billed at different rates depending on your insurance coverage.  Please reach out to your insurance provider with any questions.    If during the course of the call the physician/provider feels a video visit is not appropriate, you will not be charged for this service.\"    Patient has given verbal consent for Video visit? Yes    How would you like to obtain your AVS? MyChart    If the video visit is dropped, the invitation should be resent by: Text to cell phone: [phone]    Will anyone else be joining your video visit? No    I    Video-Visit Details    Type of service:  Video Visit    Originating Location (pt. Location): Home    Distant Location (provider location):   Off-Site - Provider Home Office    Platform used for Video Visit: [employer]Ink    Video Start Time: 8:34 AM    Video End Time:9:00 AM    Return Bariatric Surgery Note    RE: Siddharth Fontanez  MR#: [id number]  : 1990  VISIT DATE: 2024    Dear [facility], [facility] [place],    I had the pleasure of seeing your patient, Siddharth Fontanez, in my post-bariatric surgery assessment clinic.    Assessment & Plan   Problem List Items Addressed This Visit       Postsurgical malabsorption     Continue taking recommended post-op vitamins.  Labs ordered per protocol.           Relevant Orders    CBC with platelets    Iron and Iron Binding Capacity    " Ferritin    Vitamin D Screen    Parathyroid Hormone Intact    Bariatric surgery status - Primary     10/10/2023 RYGBS LEL         Chronic constipation     Anusol cream sent to pharmacy.  Apply to hemorrhoid twice daily as needed  Anusol suppository.  Place in rectum twice needed for hemorrhoid  Take 1 capful or packet for Miralax daily for constipation   Can continue Senna         Relevant Medications    hydrocortisone, Perianal, (HYDROCORTISONE) 2.5 % cream    hydrocortisone (ANUSOL-HC) 25 MG suppository    polyethylene glycol (MIRALAX) 17 g packet    Hemorrhoids, unspecified hemorrhoid type    Relevant Medications    hydrocortisone, Perianal, (HYDROCORTISONE) 2.5 % cream    hydrocortisone (ANUSOL-HC) 25 MG suppository    polyethylene glycol (MIRALAX) 17 g packet    Class 1 obesity due to excess calories without serious comorbidity with body mass index (BMI) of 33.0 to 33.9 in adult     Patient was congratulated on wt loss success thus far. Healthy habits to assist with further weight loss were discussed.             Other Visit Diagnoses       Acute bilateral low back pain without sciatica        Relevant Orders    Physical Therapy  Referral    Acute bilateral thoracic back pain        Relevant Orders    Physical Therapy  Referral             PATIENT INSTRUCTIONS:  Vitamins  Labs ordered. Call [phone] to schedule.  Continue your bariatric vitamins     Back Pain:  Referral to PT placed.  They will contact you, otherwise call [phone] to schedule.    Constipation  Anusol cream sent to pharmacy.  Apply to hemorrhoid twice daily as needed  Anusol suppository.  Place in rectum twice needed for hemorrhoid  Take 1 capful or packet for Miralax daily for constipation     FOLLOW-UP:  Call [phone] to schedule next visit in 6 months for your annual appt with diet and PA-C    35 minutes spent on the date of the encounter doing chart review, history and exam, result review, counseling, developing  plan of care, documentation, and further activities as noted        CHIEF COMPLAINT: Post-bariatric surgery follow-up    HISTORY OF PRESENT ILLNESS:      4/18/2024    10:38 AM   Questions Regarding Prior Weight Loss Surgery Reviewed With Patient   I had the following weight loss procedure Amairani-en-y Gastric Bypass   What year was your surgery? 2023   How has your weight changed since your last visit? I have lost weight   Do you currently have any of the following None of the above   Do you have any concerns today? No   Pt last seen 1/22/24.  On Topiramate 50 mg to help with HA. Still getting used to her new life with bypass.  Has fatigue, thought she would have much less.  Overall all doing well.   Has back pain.  Mid to low back.  This is new since losing weight..     Weight History:      4/18/2024    10:38 AM   --   What is your highest lifetime weight? 320   What is your lowest weight since surgery? (In pounds) 200     Initial Weight (lbs): 316 lbs  Weight: 200 lb (90.7 kg) (pt reported)  Last Visits Weight: 249 lb (112.9 kg)  Cumulative weight loss (lbs): 116  Weight Loss Percentage: 36.71%    VITAMINS AND MINERALS:  Prescribed by provider  2 Multivitamin with Minerals-AM  600 mg Calcium With Vitamin D-2 at bedtime  5000 International units Vitamin D  1000 mcg Vitamin B-12 sublingual  65 mg ferrous fumarate-AM  100 mg Thiamine one time per week        4/18/2024    10:38 AM   Questions Regarding Co-Morbidities and Health Concerns Reviewed With Patient   Pre-diabetes Never   Diabetes II Never   High Blood Pressure Never   High cholesterol Never   Heartburn/Reflux Improved   Sleep apnea Never   PCOS Never   Back pain Stayed the same   Joint pain Stayed the same   Lower leg swelling Never           4/18/2024    10:38 AM   Eating Habits   How many meals do you eat per day? 3   Do you snack between meals? Sometimes   How much food are you eating at each meal? 1/2 cup to 1 cup   Are you able to separate your meals and  liquids by at least 30 minutes? Yes   Are you able to avoid liquid calories? Yes           4/18/2024    10:38 AM   Exercise Questions Reviewed With Patient   How often do you exercise? 3 to 4 times per week   What is the duration of your exercise (in minutes)? 30 Minutes   What types of exercise do you do? walking    home gym   What keeps you from being more active? I am as active as I can possbily be       Social History:      4/18/2024    10:38 AM   --   Are you smoking? No   Are you drinking alcohol? No       Medications:  Current Outpatient Medications   Medication Sig Dispense Refill    calcium carbonate-vitamin D (CALCIUM 600 + D) 600-10 MG-MCG per tablet Take 1 tablet by mouth 2 times daily Take one twice daily and at least 2 hours apart from iron 360 tablet 1    cholecalciferol 125 MCG (5000 UT) CAPS Take 1 capsule (5,000 Units) by mouth daily 90 capsule 3    cyanocobalamin (VITAMIN B-12) 1000 MCG tablet Take 1 tablet (1,000 mcg) by mouth daily 30 tablet 11    ferrous fumarate 65 mg, [place]. FE,-Vitamin C 125 mg (VITRON C)  MG TABS tablet Take 1 tablet by mouth daily 90 tablet 3    hydrocortisone (ANUSOL-HC) 25 MG suppository Place 1 suppository (25 mg) rectally 2 times daily 12 suppository 1    hydrocortisone, Perianal, (HYDROCORTISONE) 2.5 % cream Place rectally 2 times daily as needed for hemorrhoids 30 g 2    ISIBLOOM 0.15-30 MG-MCG tablet TAKE 1 TABLET BY MOUTH EVERY DAY 84 tablet 0    Multiple Vitamins-Iron (QC DAILY MULTIVITAMINS/IRON) TABS Take 2 tablets by mouth daily 180 tablet 3    polyethylene glycol (MIRALAX) 17 g packet Take 17 g by mouth daily 100 each 4    SENNA-docusate sodium (SENNA S) 8.6-50 MG tablet Take 2 tablets by mouth 2 times daily 120 tablet 0    thiamine (B-1) 100 MG tablet Take 1 tablet (100 mg) by mouth once a week 5 tablet 11    topiramate (TOPAMAX) 50 MG tablet Take 1 tablet (50 mg) by mouth daily 90 tablet 1     No current facility-administered medications for this visit.  "        4/18/2024    10:38 AM   --   Do you avoid NSAIDs such as (Ibuprofen, Aleve, Naproxen, Advil)? Yes       ROS:  GI:       4/18/2024    10:38 AM   --   Vomiting No   Diarrhea No   Constipation Yes, once a week.  Uncomfortable, has hemmroids and gets irritated.    Swallowing trouble No   Abdominal pain No   Heartburn No     Skin:       4/18/2024    10:38 AM   BAR RBS ROS - SKIN   Rash in skin folds No     Psych:       4/18/2024    10:38 AM   --   Depression No   Anxiety No     Female Only:       4/18/2024    10:38 AM   BAR RBS ROS -    Female only Birth control    Regular menstrual cycles   Stress urinary incontinence No       LABS/IMAGING/MEDICAL RECORDS REVIEW:   Reviewed    DEXA due in 2 years.    PHYSICAL EXAMINATION:  Ht 5' 5\" (1.651 m)   Wt 200 lb (90.7 kg)   BMI 33.28 kg/m    GENERAL: Healthy, alert and no distress  RESP: No audible wheeze, cough, or visible cyanosis.  No visible retractions or increased work of breathing.    SKIN: Visible skin clear. No significant rash, abnormal pigmentation or lesions.  PSYCH: Mentation appears normal, affect normal/bright, judgement and insight intact    COUNSELING PROVIDED:  We reviewed the important post op bariatric recommendations:  eating 3 meals daily  eating protein first, getting >60gm protein daily  eating slowly, chewing food well  avoiding/limiting calorie containing beverages  avoiding fluids 30 minutes before, during, and after meals  limiting restaurant or cafeteria eating to twice a week or less  Pt reminded to avoid marginal ulcers she should avoid tobacco at all, alcohol in excess, caffeine, and NSAIDS (unless indicated for cardioprotection or otherwise and opposed by a PPI).  Pt encouraged to establish and maintain a consistent physical activity routine, 6-8 hours of restorative sleep each night and optimal stress management.  Pt counseled on the importance of life long vitamin supplementation and life long follow up.      "

## 2024-04-18 VITALS — BODY MASS INDEX: 33.32 KG/M2 | HEIGHT: 65 IN | WEIGHT: 200 LBS

## 2024-04-18 RX ORDER — SENNA AND DOCUSATE SODIUM 50; 8.6 MG/1; MG/1
2 TABLET, FILM COATED ORAL 2 TIMES DAILY
Qty: 120 TABLET | Refills: 0 | Status: CANCELLED | OUTPATIENT
Start: 2024-04-18

## 2024-04-19 ENCOUNTER — VIRTUAL VISIT (OUTPATIENT)
Dept: SURGERY | Facility: CLINIC | Age: 34
End: 2024-04-19
Payer: COMMERCIAL

## 2024-04-19 DIAGNOSIS — K59.09 CHRONIC CONSTIPATION: ICD-10-CM

## 2024-04-19 DIAGNOSIS — M54.6 ACUTE BILATERAL THORACIC BACK PAIN: ICD-10-CM

## 2024-04-19 DIAGNOSIS — E66.811 CLASS 1 OBESITY DUE TO EXCESS CALORIES WITHOUT SERIOUS COMORBIDITY WITH BODY MASS INDEX (BMI) OF 33.0 TO 33.9 IN ADULT: ICD-10-CM

## 2024-04-19 DIAGNOSIS — E66.09 CLASS 1 OBESITY DUE TO EXCESS CALORIES WITHOUT SERIOUS COMORBIDITY WITH BODY MASS INDEX (BMI) OF 33.0 TO 33.9 IN ADULT: ICD-10-CM

## 2024-04-19 DIAGNOSIS — Z98.84 BARIATRIC SURGERY STATUS: Primary | ICD-10-CM

## 2024-04-19 DIAGNOSIS — M54.50 ACUTE BILATERAL LOW BACK PAIN WITHOUT SCIATICA: ICD-10-CM

## 2024-04-19 DIAGNOSIS — K91.2 POSTSURGICAL MALABSORPTION: ICD-10-CM

## 2024-04-19 DIAGNOSIS — K64.9 HEMORRHOIDS, UNSPECIFIED HEMORRHOID TYPE: ICD-10-CM

## 2024-04-19 PROCEDURE — 97803 MED NUTRITION INDIV SUBSEQ: CPT | Mod: 95

## 2024-04-19 PROCEDURE — 99214 OFFICE O/P EST MOD 30 MIN: CPT | Mod: 95 | Performed by: PHYSICIAN ASSISTANT

## 2024-04-19 RX ORDER — HYDROCORTISONE 25 MG/G
CREAM TOPICAL 2 TIMES DAILY PRN
Qty: 30 G | Refills: 2 | Status: SHIPPED | OUTPATIENT
Start: 2024-04-19

## 2024-04-19 RX ORDER — HYDROCORTISONE ACETATE 25 MG/1
25 SUPPOSITORY RECTAL 2 TIMES DAILY
Qty: 12 SUPPOSITORY | Refills: 1 | Status: SHIPPED | OUTPATIENT
Start: 2024-04-19

## 2024-04-19 RX ORDER — POLYETHYLENE GLYCOL 3350 17 G/17G
1 POWDER, FOR SOLUTION ORAL DAILY
Qty: 100 EACH | Refills: 4 | Status: SHIPPED | OUTPATIENT
Start: 2024-04-19

## 2024-04-19 NOTE — PATIENT INSTRUCTIONS
Nice to talk with you today.  Below is the plan discussed.-  . Kisha Marrero PA-C    Plan:  Vitamins  Labs ordered. Call [phone] to schedule.  Continue your bariatric vitamins     Back Pain:  Referral to PT placed.  They will contact you, otherwise call [phone] to schedule.    Constipation  Anusol cream sent to pharmacy.  Apply to hemorrhoid twice daily as needed  Anusol suppository.  Place in rectum twice needed for hemorrhoid  Take 1 capful or packet for Miralax daily for constipation     FOLLOW-UP:  Call [phone] to schedule next visit in 6 months for your annual appt with diet and PAYARA    Bariatric Post Op Guidelines  General:  Follow up annually lifelong.   Obesity is a chronic disease.  Weight gain can be expected. The goal of follow-up visits is to ensure adequate vitamin and protein absorption, evaluate food intake behavior, review exercise/activity level, and assist with weight regain.    To avoid marginal ulcers avoid all forms of tobacco, alcohol in excess, caffeine, and NSAIDS     Exercise is key for weight loss and weight maintenance. Aim for 30-60 minutes of physical activity most days.  Include cardiovascular and strength training.    Continue lifelong vitamins supplementation and annual lab follow up.  All patients should supplement with the following bariatric postoperative vitamins:  2 Complete multivitamins with minerals (at different times than calcium)  Vitamin D 5000 Int Units/125 mg daily   Calcium 600 mg twice daily or 500 mg three times daily   Vitamin B12: 500 mcg sl daily or 1000 mcg Inj monthly  B complex daily or Thiamine 100 mg weekly  1 Iron/Vit C. Daily for females who menstruate and/or as directed    Relay and GI symptoms which can be a sign of complications. Inability to tolerate solid food (chicken, steak, fish) should by need to be evaluated.    There is a 10% increase of Alcohol Use Disorder in patients with bariatric surgery.   Most often occurring around 2  years post op.  Call if you feel alcohol is interfering in your daily life.  We can help.     Nutritional:  Eat 3 meals per day  (No snacks between meals.)  Do not skip meals.  This can cause overeating at the next meal and will prevent adequate protein and nutritional intake.    Aim for 60-80 grams of protein per day.  Always eat your protein first. This assists with optimal nutrition and helps you stay full longer.    Eat your protein first, and then follow with fiber.    Add fiber by including fruits, vegetables, whole grains, and beans.     Portions should be 1 cup per meal.   Continue to use saucer/salad plates, infant/toddler silverware to keep portion sizes small and take small bites.  Make each meal last 20-30 minutes. Always stop eating when satisfied.    Aim for 64 oz. of calorie-free fluids daily.    Avoid drinking 30 min before, during, and 30 min after meal    Avoid high sugar and high fat foods to prevent high calorie intake.   Check nutrition labels for less than 10 grams of sugar and less than 10 grams of fat per serving.

## 2024-04-19 NOTE — ASSESSMENT & PLAN NOTE
Anusol cream sent to pharmacy.  Apply to hemorrhoid twice daily as needed  Anusol suppository.  Place in rectum twice needed for hemorrhoid  Take 1 capful or packet for Miralax daily for constipation   Can continue Senna

## 2024-04-19 NOTE — PROGRESS NOTES
"Siddharth is a 34 year old who is being evaluated via a billable video visit.      The patient has been notified of following:     \"This video visit will be conducted via a call between you and your physician/provider. We have found that certain health care needs can be provided without the need for an in-person physical exam.  This service lets us provide the care you need with a video conversation.  If a prescription is necessary we can send it directly to your pharmacy.  If lab work is needed we can place an order for that and you can then stop by our lab to have the test done at a later time.    Video visits are billed at different rates depending on your insurance coverage.  Please reach out to your insurance provider with any questions.    If during the course of the call the physician/provider feels a video visit is not appropriate, you will not be charged for this service.\"    Patient has given verbal consent for Video visit? Yes    How would you like to obtain your AVS? MyChart    If the video visit is dropped, the invitation should be resent by: Text to cell phone: [phone]    Will anyone else be joining your video visit? No    I    Video-Visit Details    Type of service:  Video Visit    Originating Location (pt. Location): Home    Distant Location (provider location):   Other:  [facility]    Platform used for Video Visit: [employer]    Video Start Time:  8:59  AM    Video End Time: 9:24   AM     NUTRITION POST OP APPOINTMENT  DATE OF VISIT: April 19, 2024    Siddharth Fontanez  1990  female  [id number]  34 year old     ASSESSMENT:    REASON FOR VISIT:  Siddharth is a 34 year old year old female presents today for 6 month PO nutrition follow-up appointment. Patient is accompanied by self.    DIAGNOSIS:  Status post gastric bypass surgery.  Obesity Grade I BMI 30-34.9     ANTHROPOMETRICS:  Initial Weight: 316 lb   Height: 65\"   Previous Weight: 257.6 lb    Current weight: 200 lbs   BMI:  33.2 kg/(m^2).    VITAMINS AND " MINERALS:  Prescribed by provider  2 Multivitamin with Minerals-AM   600 mg Calcium With Vitamin D BID (4 pm/ bed time)  5000 International units Vitamin D  1000 mcg Vitamin B-12 sublingual  Vitron C AM  100 mg Thiamin one time per week    NUTRITION HISTORY:  Breakfast: protein shake (almond milk with oatmeal + powdered cinnamon + francoise seeds + honey)   Lunch: Protein shake (Protein shake) + tuna sandwich or boiled eggs or salad or avocado toast   Supper:  Protein shake; prefers chicken   Snacks: orange slices  Fluids consumed: water; Minute Maid Zero juice; Gatorade Zero; protein shakes    Consuming liquid calories: Yes  Protein intake: 60+ grams/day  Tolerate regular texture food: Yes  Any foods not tolerated details: No  If any food not tolerated: none  Portion size: 1 cup  Take 20-30 minutes to consume each meal: Yes   Eat protein foods first: Yes  Fluids and meals separate by at least 30 minutes: Yes  Chew foods thoroughly: Yes  Tolerating diet: Yes  Drinking high protein supplements: Yes  Consuming snacks per day: varies   Additional Information: Patient tolerating diet.  Pleased with weight loss.  Patient taking care of mother and cooking meals for her mother and brother which they prefer red meat and patient prefers chicken. Patient may not have the desire to prepare separate meal for self so may drink protein shake for dinner.     PHYSICAL ACTIVITY:  Type: treadmilll + walking + dumb bells   Frequency (days per week): 3  Duration (min): 30    DIAGNOSIS:  Previous Nutrition Diagnosis: Altered gastrointestinal function related to alteration in gastrointestinal structure as evidenced by history of gastric bypass surgery.- no change    Previous goals:  Select food with < than 10 grams of total sugar or total fat per serving-improving   Eat protein at every meal and aim for at lest 60 grams of protein (pt has list of high protein foods)-met  Aim to have protein + 2 other food groups-not met     Current Nutrition  Diagnosis: Altered gastrointestinal function related to alteration in gastrointestinal structure as evidenced by history of gastric bypass surgery.    INTERVENTION:   Nutrition Prescription: Eat 3 meals a day at regular intervals. Consume 60-90 grams of protein daily. Follow post-surgical vitamin and mineral protocol.  Assessed learning needs and learning preferences.    GOALS:  Eat 3 food groups per meal  Eat solids at meals and aim for protein shakes between meals     Implementation: Discussed progress toward previous goals; reinforced importance of following bariatric lifestyle changes.    NUTRITION MONITORING AND EVALUATION:  Expected patient engagement: good  Verbalized good understanding.    Follow up: Patient to follow up in 6 months for 1 year post op appointment.     TIME SPENT WITH PATIENT:  25 minutes    Candy Perez, RD, LD  [facility]  [phone] (office phone)

## 2024-04-19 NOTE — PATIENT INSTRUCTIONS
Macario Messina,    Congratulations on your weight loss thus far!  Keep up the excellent work.    Focus on eating 3 food groups per meal (fruits/vegetables/grains)   Aim for 3 meals per day with protein shakes between meals    Please call [phone] to schedule your next RD appointment in 6 months for your 1 year post op appointment.    Have a great few months!    Take care,    Candy Dueñas, RD, LD  [facility]   [phone] (office phone)

## 2024-04-30 ENCOUNTER — THERAPY VISIT (OUTPATIENT)
Dept: PHYSICAL THERAPY | Facility: CLINIC | Age: 34
End: 2024-04-30
Attending: PHYSICIAN ASSISTANT
Payer: COMMERCIAL

## 2024-04-30 DIAGNOSIS — M54.6 ACUTE BILATERAL THORACIC BACK PAIN: ICD-10-CM

## 2024-04-30 DIAGNOSIS — M54.50 ACUTE BILATERAL LOW BACK PAIN WITHOUT SCIATICA: ICD-10-CM

## 2024-04-30 PROCEDURE — 97110 THERAPEUTIC EXERCISES: CPT | Mod: GP

## 2024-04-30 PROCEDURE — 97161 PT EVAL LOW COMPLEX 20 MIN: CPT | Mod: GP

## 2024-04-30 NOTE — PROGRESS NOTES
"PHYSICAL THERAPY EVALUATION  Type of Visit: Evaluation    See electronic medical record for Abuse and Falls Screening details.    Subjective   Pt has been developing some pain in her back after receiving weight loss surgery last October.      Presenting condition or subjective complaint:    Date of onset: (P) 10/10/23    Relevant medical history:     Past Medical History:   Diagnosis Date    Irregular periods     Migraine 08/10/2022    Papanicolaou smear of cervix with low grade squamous intraepithelial lesion (LGSIL) 08/25/2022     Dates & types of surgery:     Past Surgical History:   Procedure Laterality Date    LAPAROSCOPIC BYPASS GASTRIC N/A 10/10/2023    Procedure: Laparoscopic Amairani-en-y Gastric Bypass;  Surgeon: Alvaro Zayas MD;  Location: [facility] OR    TONSILLECTOMY  1995    approx    TONSILLECTOMY       Prior diagnostic imaging/testing results:      XR: IMPRESSION: No fracture is identified. Moderate degenerative disc  disease at L5-S1, unchanged. Mild lower lumbar spine facet  arthropathy. Possible grade 1 anterolisthesis of L5 on S1, similar in  flexion versus extension.  Prior therapy history for the same diagnosis, illness or injury: Yes      Employment: No    Hobbies/Interests:      Patient goals for therapy:   exercising without pain       Objective   LUMBAR SPINE EVALUATION  PAIN: Pain Level at Rest: 7/10  Pain Level with Use: 9/10  Pain Location: thoracic spine and lumbar spine  Pain Quality: Aching, Nagging, Sharp, and tight  Pain Frequency: constant  Pain is Worst: no pattern in daytime vs nighttime experience  Pain is Exacerbated By: exercising  Pain is Relieved By: use and exercising.  POSTURE: Standing Posture: Rounded shoulders, Forward head, Thoracic kyphosis increased  GAIT:   B lateral shift as part of compensated trendelenburg sign.   ROM:   (Degrees) Left AROM Left PROM  Right AROM Right PROM   Hip Flexion  Mild limitation with \"good stretch\"  Mild limitation with \"good stretch\"   Hip " "Internal Rotation  ~30  ~30   Hip External Rotation  Mild limitation with \"good stretch\"  Mild limitation with \"good stretch\"   Knee Flexion WFL  WFL    Knee Extension WFL  WFL    Lumbar Side glide Mild diffuse discomfort Mild diffuse discomfort   Lumbar Flexion Fingers to floor - mild knee flexion - \"good stretch\" in back   Lumbar Extension Limited with localized pain in back   Pain:   End feel:   STRENGTH: WFL - noted mild impairment to L knee extension strength with pain  NEURAL TENSION:  Separate radicular complication in L LE being seen elsewhere  FLEXIBILITY:  Limitations to muscle length testing to B iliopsoas, B TFL, B rectus femoris  FUNCTIONAL TESTS: Double Leg Squat: Anterior knee translation, Knee valgus, Hip internal rotation, Improper use of glutes/hips, and <50% ROM  SLS: Impaired balance on L LE with increased hip strategy and CGA from therapist  PALPATION:  Tenderness to palpation noted to R>L PSIS with reproduction of L>R low back pain, L>R lumbar paraspinals    Assessment & Plan   CLINICAL IMPRESSIONS  Medical Diagnosis: Low back pain    Treatment Diagnosis: Low back pain with strength and mobility deficits   Impression/Assessment: Patient is a 34 year old female with Low back pain complaints.  The following significant findings have been identified: Pain, Decreased ROM/flexibility, Decreased joint mobility, Decreased strength, Impaired balance, Decreased proprioception, Impaired gait, Impaired muscle performance, and Decreased activity tolerance. These impairments interfere with their ability to perform self care tasks, work tasks, recreational activities, household chores, household mobility, and community mobility as compared to previous level of function.     Clinical Decision Making (Complexity):  Clinical Presentation: Evolving/Changing  Clinical Presentation Rationale: based on medical and personal factors listed in PT evaluation  Clinical Decision Making (Complexity): Low " complexity    PLAN OF CARE  Treatment Interventions:  Interventions: Gait Training, Manual Therapy, Neuromuscular Re-education, Therapeutic Activity, Therapeutic Exercise, Self-Care/Home Management    Long Term Goals     PT Goal 1  Goal Identifier: (P) exercise  Goal Description: (P) Pt will return to traditional exercise routine with 2/10 pain or less in back  Rationale: (P) to maximize safety and independence with performance of ADLs and functional tasks  Target Date: (P) 07/29/24      Frequency of Treatment: 1x/week  Duration of Treatment: (P) 3 months    Education Assessment:   Learner/Method: Patient;Demonstration;Pictures/Video;No Barriers to Learning    Risks and benefits of evaluation/treatment have been explained.   Patient/Family/caregiver agrees with Plan of Care.     Evaluation Time:     PT Eval, Low Complexity Minutes (01171): (P) 17     Signing Clinician: MARIKA BLOUNT [facility]                                                                                   OUTPATIENT PHYSICAL THERAPY      PLAN OF TREATMENT FOR OUTPATIENT REHABILITATION   Patient's Last Name, First Name, Siddharth Dent YOB: 1990   Provider's Name   [facility]   Medical Record No.  [id number]     Onset Date: (P) 10/10/23  Start of Care Date: (P) 04/30/24     Medical Diagnosis:  Low back pain      PT Treatment Diagnosis:  Low back pain with strength and mobility deficits Plan of Treatment  Frequency/Duration: 1x/week/ (P) 3 months    Certification date from (P) 04/30/24 to (P) 07/29/24         See note for plan of treatment details and functional goals     MARIKA BUCIO                         I CERTIFY THE NEED FOR THESE SERVICES FURNISHED UNDER        THIS PLAN OF TREATMENT AND WHILE UNDER MY CARE     (Physician attestation of this document indicates review and certification of the therapy plan).              Referring Provider:  Kisha Schuler  GUERO Marrero    Initial Assessment  See Epic Evaluation- Start of Care Date: (P) 04/30/24

## 2024-05-01 PROBLEM — M54.6 ACUTE BILATERAL THORACIC BACK PAIN: Status: ACTIVE | Noted: 2024-05-01

## 2024-05-01 PROBLEM — M54.50 ACUTE BILATERAL LOW BACK PAIN WITHOUT SCIATICA: Status: ACTIVE | Noted: 2024-05-01

## 2024-05-06 NOTE — PROGRESS NOTES
SUBJECTIVE:          This is a total of 20 minutes in the care of Siddharth on the day of service including 15 minutes of face-to-face time with the remainder in chart review, care coordination, documentation on the date of service.                                             Siddharth Fontanez is a 34 year old female who presents to clinic today for the following health issue(s):  birth control No chief complaint on file.    Patient is a 34-year-old P0 who presents for discussion regarding birth control.  She has been approximately 6 months out from having bariatric surgery.  She does not have any particular issues at this point taking pills.  She notes that she has to take approximately different medications most in the form of vitamins a day because of the bowel resection.  She has lost upwards 100 pounds in the last half year.  She notes that that she is very aware of body dysphoria as a result of having bariatric surgery.    She is been on variation of Desogen in the generic form.  Asking the question of if generic is    Different when it comes to side effects.  This led to his short conversation about the difference between generic and name brand.  I described that overall generics that I prescribed are related to Desogen I have not received any complaints that 1 works better than another.    Examination is deferred    Assessment is menstrual irregularities with the management by way of birth control pills.      Plan: Will refill her prescription for Desogen generic and then return as needed      No LMP recorded. (Menstrual status: Irregular Periods)..     Patient is sexually active, .  Using oral contraceptives for contraception.    reports that she quit smoking about 10 months ago. Her smoking use included hookah. She has never used smokeless tobacco.        Today's PHQ-2 Score:       2023    10:15 AM   PHQ-2 (  [employer])   Q1: Little interest or pleasure in doing things 0   Q2: Feeling down, depressed  or hopeless 0   PHQ-2 Score 0     Today's PHQ-9 Score:       2023    11:18 AM   PHQ-9 SCORE   PHQ-9 Total Score MyChart 1 (Minimal depression)   PHQ-9 Total Score 1     Today's TRINITY-7 Score:       2023    11:18 AM   TRINITY-7 SCORE   Total Score 0 (minimal anxiety)   Total Score 0       Problem list and histories reviewed & adjusted, as indicated.  Additional history: as documented.    Patient Active Problem List   Diagnosis    Foot dislocation    Headache    CARDIOVASCULAR SCREENING; LDL GOAL LESS THAN 160    Morbid obesity (H)    Low grade squamous intraepithelial lesion on cytologic smear of cervix (LGSIL)    Migraine    Irregular periods    Known health problems: none    Morbid obesity with BMI of 45.0-49.9, adult (H)    Postsurgical malabsorption    Bariatric surgery status    Slow transit constipation    Chronic constipation    Hemorrhoids, unspecified hemorrhoid type    Class 1 obesity due to excess calories without serious comorbidity with body mass index (BMI) of 33.0 to 33.9 in adult    Acute bilateral low back pain without sciatica    Acute bilateral thoracic back pain     Past Surgical History:   Procedure Laterality Date    LAPAROSCOPIC BYPASS GASTRIC N/A 10/10/2023    Procedure: Laparoscopic Amairani-en-y Gastric Bypass;  Surgeon: Alvaro Zayas MD;  Location: [facility] OR    TONSILLECTOMY      approx    TONSILLECTOMY        Social History     Tobacco Use    Smoking status: Former     Types: Hookah     Quit date: 2023     Years since quittin.8    Smokeless tobacco: Never   Substance Use Topics    Alcohol use: No      Problem (# of Occurrences) Relation (Name,Age of Onset)    Gestational Diabetes (1) Sister    Diabetes (1) Mother    Hypertension (1) Mother    Obesity (3) Mother, Father, Sister              Current Outpatient Medications   Medication Sig Dispense Refill    calcium carbonate-vitamin D (CALCIUM 600 + D) 600-10 MG-MCG per tablet Take 1 tablet by mouth 2 times daily Take one twice  daily and at least 2 hours apart from iron 360 tablet 1    cholecalciferol 125 MCG (5000 UT) CAPS Take 1 capsule (5,000 Units) by mouth daily 90 capsule 3    cyanocobalamin (VITAMIN B-12) 1000 MCG tablet Take 1 tablet (1,000 mcg) by mouth daily 30 tablet 11    ferrous fumarate 65 mg, [place]. FE,-Vitamin C 125 mg (VITRON C)  MG TABS tablet Take 1 tablet by mouth daily 90 tablet 3    hydrocortisone (ANUSOL-HC) 25 MG suppository Place 1 suppository (25 mg) rectally 2 times daily 12 suppository 1    hydrocortisone, Perianal, (HYDROCORTISONE) 2.5 % cream Place rectally 2 times daily as needed for hemorrhoids 30 g 2    ISIBLOOM 0.15-30 MG-MCG tablet TAKE 1 TABLET BY MOUTH EVERY DAY 84 tablet 0    Multiple Vitamins-Iron (QC DAILY MULTIVITAMINS/IRON) TABS Take 2 tablets by mouth daily 180 tablet 3    polyethylene glycol (MIRALAX) 17 g packet Take 17 g by mouth daily 100 each 4    SENNA-docusate sodium (SENNA S) 8.6-50 MG tablet Take 2 tablets by mouth 2 times daily 120 tablet 0    thiamine (B-1) 100 MG tablet Take 1 tablet (100 mg) by mouth once a week 5 tablet 11    topiramate (TOPAMAX) 50 MG tablet Take 1 tablet (50 mg) by mouth daily 90 tablet 1     No current facility-administered medications for this visit.     Allergies   Allergen Reactions    Asa [Aspirin]      Gastric Bypass 10/10/23    Contrast Dye Hives     Hives after Isovue-370     Nsaids      Gastric Bypass 10/10/23               OBJECTIVE:     There were no vitals taken for this visit.  There is no height or weight on file to calculate BMI.    Exam:       In-Clinic Test Results:  No results found for this or any previous visit (from the past 24 hour(s)).    ASSESSMENT/PLAN:                                                    Menstrual management with birth control pills as she has been losing weight secondary to bariatric surgery.      Ari Rodriguez MD  [facility]

## 2024-05-07 ENCOUNTER — OFFICE VISIT (OUTPATIENT)
Dept: OBGYN | Facility: CLINIC | Age: 34
End: 2024-05-07
Payer: COMMERCIAL

## 2024-05-07 VITALS — BODY MASS INDEX: 38.11 KG/M2 | SYSTOLIC BLOOD PRESSURE: 112 MMHG | DIASTOLIC BLOOD PRESSURE: 70 MMHG | WEIGHT: 229 LBS

## 2024-05-07 DIAGNOSIS — Z30.09 BIRTH CONTROL COUNSELING: Primary | ICD-10-CM

## 2024-05-07 DIAGNOSIS — N93.9 ABNORMAL UTERINE BLEEDING: ICD-10-CM

## 2024-05-07 PROCEDURE — 99213 OFFICE O/P EST LOW 20 MIN: CPT | Performed by: OBSTETRICS & GYNECOLOGY

## 2024-05-07 RX ORDER — DESOGESTREL AND ETHINYL ESTRADIOL 0.15-0.03
1 KIT ORAL DAILY
Qty: 84 TABLET | Refills: 3 | Status: SHIPPED | OUTPATIENT
Start: 2024-05-07

## 2024-05-14 ENCOUNTER — THERAPY VISIT (OUTPATIENT)
Dept: PHYSICAL THERAPY | Facility: CLINIC | Age: 34
End: 2024-05-14
Attending: PHYSICIAN ASSISTANT
Payer: COMMERCIAL

## 2024-05-14 DIAGNOSIS — M54.50 ACUTE BILATERAL LOW BACK PAIN WITHOUT SCIATICA: Primary | ICD-10-CM

## 2024-05-14 DIAGNOSIS — M54.6 ACUTE BILATERAL THORACIC BACK PAIN: ICD-10-CM

## 2024-05-14 PROCEDURE — 97110 THERAPEUTIC EXERCISES: CPT | Mod: GP | Performed by: PHYSICAL THERAPIST

## 2024-05-23 ENCOUNTER — THERAPY VISIT (OUTPATIENT)
Dept: PHYSICAL THERAPY | Facility: CLINIC | Age: 34
End: 2024-05-23
Payer: COMMERCIAL

## 2024-05-23 DIAGNOSIS — M54.6 ACUTE BILATERAL THORACIC BACK PAIN: ICD-10-CM

## 2024-05-23 DIAGNOSIS — M54.50 ACUTE BILATERAL LOW BACK PAIN WITHOUT SCIATICA: Primary | ICD-10-CM

## 2024-05-23 PROCEDURE — 97110 THERAPEUTIC EXERCISES: CPT | Mod: GP | Performed by: PHYSICAL THERAPIST

## 2024-05-30 ENCOUNTER — THERAPY VISIT (OUTPATIENT)
Dept: PHYSICAL THERAPY | Facility: CLINIC | Age: 34
End: 2024-05-30
Payer: COMMERCIAL

## 2024-05-30 DIAGNOSIS — M54.50 ACUTE BILATERAL LOW BACK PAIN WITHOUT SCIATICA: Primary | ICD-10-CM

## 2024-05-30 DIAGNOSIS — M54.6 ACUTE BILATERAL THORACIC BACK PAIN: ICD-10-CM

## 2024-05-30 PROCEDURE — 97110 THERAPEUTIC EXERCISES: CPT | Mod: GP | Performed by: PHYSICAL THERAPIST

## 2024-06-06 ENCOUNTER — THERAPY VISIT (OUTPATIENT)
Dept: PHYSICAL THERAPY | Facility: CLINIC | Age: 34
End: 2024-06-06
Payer: COMMERCIAL

## 2024-06-06 DIAGNOSIS — M54.50 ACUTE BILATERAL LOW BACK PAIN WITHOUT SCIATICA: Primary | ICD-10-CM

## 2024-06-06 DIAGNOSIS — M54.6 ACUTE BILATERAL THORACIC BACK PAIN: ICD-10-CM

## 2024-06-06 PROCEDURE — 97110 THERAPEUTIC EXERCISES: CPT | Mod: GP | Performed by: PHYSICAL THERAPIST

## 2024-06-08 DIAGNOSIS — K91.2 POSTSURGICAL MALABSORPTION: ICD-10-CM

## 2024-06-14 RX ORDER — BACILLUS COAGULANS 1B CELL
1 CAPSULE ORAL DAILY
Qty: 120 TABLET | Refills: 0 | Status: SHIPPED | OUTPATIENT
Start: 2024-06-14

## 2024-06-14 NOTE — TELEPHONE ENCOUNTER
----- Message from Mj Shukla MD sent at 8/12/2021 10:51 AM EDT -----  Regarding: FW: Prescription Question  Contact: [phone]      ----- Message -----  From: Evy Mae MA  Sent: 8/12/2021   9:22 AM EDT  To: Mj Shukla MD  Subject: Prescription Question                            Please advise    ----- Message -----  From: Federico Angeles  Sent: 8/12/2021   8:55 AM EDT  To: Albertina Delgado j [facility]  Subject: Prescription Question                            My last steroid pack was May 15. Is it too soon for another? Lower left back pain especially in the mornings. Hard to stand completely upright. My next appointment is Sept 28. Need to discuss options. Epidural, ect.  Dae Angeles   Due for follow-up in October.    Will refill until then.    Then needs appt.    Jasmin MCCALLUM RN

## 2024-06-17 ENCOUNTER — THERAPY VISIT (OUTPATIENT)
Dept: PHYSICAL THERAPY | Facility: CLINIC | Age: 34
End: 2024-06-17
Payer: COMMERCIAL

## 2024-06-17 DIAGNOSIS — M54.50 ACUTE BILATERAL LOW BACK PAIN WITHOUT SCIATICA: Primary | ICD-10-CM

## 2024-06-17 DIAGNOSIS — M54.6 ACUTE BILATERAL THORACIC BACK PAIN: ICD-10-CM

## 2024-06-17 PROCEDURE — 97110 THERAPEUTIC EXERCISES: CPT | Mod: GP | Performed by: PHYSICAL THERAPIST

## 2024-06-24 ENCOUNTER — THERAPY VISIT (OUTPATIENT)
Dept: PHYSICAL THERAPY | Facility: CLINIC | Age: 34
End: 2024-06-24
Payer: COMMERCIAL

## 2024-06-24 DIAGNOSIS — M54.6 ACUTE BILATERAL THORACIC BACK PAIN: ICD-10-CM

## 2024-06-24 DIAGNOSIS — M54.50 ACUTE BILATERAL LOW BACK PAIN WITHOUT SCIATICA: Primary | ICD-10-CM

## 2024-06-24 PROCEDURE — 97110 THERAPEUTIC EXERCISES: CPT | Mod: GP | Performed by: PHYSICAL THERAPIST

## 2024-07-01 ENCOUNTER — THERAPY VISIT (OUTPATIENT)
Dept: PHYSICAL THERAPY | Facility: CLINIC | Age: 34
End: 2024-07-01
Payer: COMMERCIAL

## 2024-07-01 DIAGNOSIS — M54.50 ACUTE BILATERAL LOW BACK PAIN WITHOUT SCIATICA: Primary | ICD-10-CM

## 2024-07-01 DIAGNOSIS — M54.6 ACUTE BILATERAL THORACIC BACK PAIN: ICD-10-CM

## 2024-07-01 PROCEDURE — 97110 THERAPEUTIC EXERCISES: CPT | Mod: GP | Performed by: PHYSICAL THERAPIST

## 2024-07-08 ENCOUNTER — THERAPY VISIT (OUTPATIENT)
Dept: PHYSICAL THERAPY | Facility: CLINIC | Age: 34
End: 2024-07-08
Payer: COMMERCIAL

## 2024-07-08 DIAGNOSIS — M54.50 ACUTE BILATERAL LOW BACK PAIN WITHOUT SCIATICA: Primary | ICD-10-CM

## 2024-07-08 DIAGNOSIS — M54.6 ACUTE BILATERAL THORACIC BACK PAIN: ICD-10-CM

## 2024-07-08 PROCEDURE — 97110 THERAPEUTIC EXERCISES: CPT | Mod: GP | Performed by: PHYSICAL THERAPIST

## 2024-07-12 ENCOUNTER — DOCUMENTATION ONLY (OUTPATIENT)
Dept: SURGERY | Facility: CLINIC | Age: 34
End: 2024-07-12
Payer: COMMERCIAL

## 2024-07-15 ENCOUNTER — THERAPY VISIT (OUTPATIENT)
Dept: PHYSICAL THERAPY | Facility: CLINIC | Age: 34
End: 2024-07-15
Payer: COMMERCIAL

## 2024-07-15 DIAGNOSIS — M54.50 ACUTE BILATERAL LOW BACK PAIN WITHOUT SCIATICA: Primary | ICD-10-CM

## 2024-07-15 DIAGNOSIS — M54.6 ACUTE BILATERAL THORACIC BACK PAIN: ICD-10-CM

## 2024-07-15 PROCEDURE — 97110 THERAPEUTIC EXERCISES: CPT | Mod: GP | Performed by: PHYSICAL THERAPIST

## 2024-07-15 NOTE — PROGRESS NOTES
"     07/15/24 0500   Appointment Info   Signing clinician's name / credentials Maty Torres, PT, SCS   Total/Authorized Visits E&T   Visits Used 10   Medical Diagnosis Low back pain   PT Tx Diagnosis Low back pain with strength and mobility deficits   Other pertinent information name- \"I- on\"   Quick Adds Certification   Progress Note/Certification   Start of Care Date 04/30/24   Onset of illness/injury or Date of Surgery 10/10/23   Therapy Frequency 1x/week   Predicted Duration 3 months   Certification date from 04/30/24   Certification date to 07/29/24   Progress Note Due Date 07/29/24   Progress Note Completed Date 07/15/24   PT Goal 1   Goal Identifier exercise   Goal Description Pt will return to traditional exercise routine with 2/10 pain or less in back   Rationale to maximize safety and independence with performance of ADLs and functional tasks   Target Date 07/29/24   Subjective Report   Subjective Report Pt notes her back is a little more flared up today due to her cousin's wedding (dancing, standing, on her feet all day). Pt notes stretching daily, and strengthening 3-4X/week (but not that last few days due to the wedding). Pt notes being 60% better since eval   Objective Measures   Objective Measures Objective Measure 1;Objective Measure 2   Objective Measure 1   Objective Measure AROM lumbar   Details flexion= toes (pain); ; extension- mod limit (no pain); SG R=nil (no pain)  SG L=nil ( pain)   Treatment Interventions (PT)   Interventions Therapeutic Procedure/Exercise   Therapeutic Procedure/Exercise   Therapeutic Procedures Ther Proc 2;Ther Proc 3;Ther Proc 4;Ther Proc 5;Ther Proc 6   Ther Proc 1 posture -   Ther Proc 1 - Details being aware when sitting; trying not to slouch   Ther Proc 2 Nustep seat 9; level 5   Ther Proc 2 - Details X10 min   Ther Proc 3 GABBY   Ther Proc 3 - Details 1 min   Ther Proc 4 press ups   Ther Proc 4 - Details X10 with exhale at top (no change); X10 with PT OP ( no pain) "   Ther Proc 5 Pt education   Ther Proc 5 - Details on frequency of exercises (min of 40 reps for press ups/day); lumbar support roll, posture, avoiding flexion   Ther Proc 6 Leg press: seat 5   Ther Proc 6 - Details DL: 5 pl X10 X 3 sets ; SL: 4pl X10 each X 3 sets   PTRx Ther Proc 1 Standing Extension   PTRx Ther Proc 1 - Details No Notes   PTRx Ther Proc 2 Repeated Extension in Lying with Lock/Sag   PTRx Ther Proc 2 - Details X10   PTRx Ther Proc 3 Supine Abdominal Exercise #2 (Heelslide)   PTRx Ther Proc 3 - Details X20   PTRx Ther Proc 4 Bridging With Theraband   PTRx Ther Proc 4 - Details X5 sec X20 (BTB)   PTRx Ther Proc 5 Clamshell Feet together   PTRx Ther Proc 5 - Details X20 each (GTB at knees)   PTRx Ther Proc 6 Sit to Stand   PTRx Ther Proc 6 - Details BTB: X20   PTRx Ther Proc 7 Side Stepping With Theraband   PTRx Ther Proc 7 - Details X10 each RTB X 2 sets   Skilled Intervention instructed on exercises to improve lumbar ROM and pain   Patient Response/Progress added to HEP. Goal is promotion of pain free motion and tolerance for mobility work.   Therapeutic Activity   PTRx Ther Act 1 Posture Correction with Lumbar Roll   PTRx Ther Act 1 - Details No Notes   Education   Learner/Method Patient;Demonstration;Pictures/Video;No Barriers to Learning   Plan   Home program PTrx       PLAN  Continue therapy per current plan of care.    Beginning/End Dates of Progress Note Reporting Period:  07/15/24 to 07/15/2024    Referring Provider:  Kisha Marrero

## 2024-07-30 ENCOUNTER — THERAPY VISIT (OUTPATIENT)
Dept: PHYSICAL THERAPY | Facility: CLINIC | Age: 34
End: 2024-07-30
Payer: COMMERCIAL

## 2024-07-30 DIAGNOSIS — M54.50 ACUTE BILATERAL LOW BACK PAIN WITHOUT SCIATICA: Primary | ICD-10-CM

## 2024-07-30 DIAGNOSIS — M54.6 ACUTE BILATERAL THORACIC BACK PAIN: ICD-10-CM

## 2024-07-30 PROCEDURE — 97110 THERAPEUTIC EXERCISES: CPT | Mod: GP | Performed by: PHYSICAL THERAPIST

## 2024-07-30 NOTE — PROGRESS NOTES
"   07/30/24 0500   Appointment Info   Signing clinician's name / credentials Maty Torres, PT, SCS   Total/Authorized Visits E&T   Visits Used 11   Medical Diagnosis Low back pain   PT Tx Diagnosis Low back pain with strength and mobility deficits   Other pertinent information name- \"I- on\"   Quick Adds Certification   Progress Note/Certification   Start of Care Date 04/30/24   Onset of illness/injury or Date of Surgery 10/10/23   Therapy Frequency 1x/week   Predicted Duration 12 weeks   Certification date from 07/29/24   Certification date to 10/21/24   Progress Note Due Date 10/21/24   Progress Note Completed Date 07/30/24   PT Goal 1   Goal Identifier exercise   Goal Description Pt will return to traditional exercise routine with 2/10 pain or less in back   Rationale to maximize safety and independence with performance of ADLs and functional tasks   Goal Progress improving with her PT exercsises   Target Date 10/21/24   Subjective Report   Subjective Report Pt notes back is a little more sore this week, she notes having to help her mom up from a fall in the bathroom. Still trying to get her walking steps in throughout the week. Has only done a little of the PT strengthening since the flare up.   Objective Measures   Objective Measures Objective Measure 1;Objective Measure 2   Objective Measure 1   Objective Measure AROM lumbar   Details flexion= toes (pain); ; extension- mod limit (no pain); SG R=nil (no pain)  SG L=nil ( pain)   Treatment Interventions (PT)   Interventions Therapeutic Procedure/Exercise   Therapeutic Procedure/Exercise   Therapeutic Procedures Ther Proc 2;Ther Proc 3;Ther Proc 4;Ther Proc 5;Ther Proc 6   Ther Proc 1 posture -   Ther Proc 1 - Details being aware when sitting; trying not to slouch   Ther Proc 2 Nustep seat 9; level 5   Ther Proc 2 - Details X10 min   Ther Proc 4 press ups   Ther Proc 4 - Details X10 with exhale at top (no change); X10 with PT OP ( no pain)   Ther Proc 5 Pt " education   Ther Proc 5 - Details on frequency of exercises (min of 40 reps for press ups/day); lumbar support roll, posture, avoiding flexion   Ther Proc 6 Leg press: seat 5   Ther Proc 6 - Details DL: 5 pl X10 X 3 sets ; SL: 4pl X10 each X 3 sets   PTRx Ther Proc 1 Standing Extension   PTRx Ther Proc 1 - Details No Notes   PTRx Ther Proc 2 Repeated Extension in Lying with Lock/Sag   PTRx Ther Proc 2 - Details X10   PTRx Ther Proc 3 Supine Abdominal Exercise #2 (Heelslide)   PTRx Ther Proc 3 - Details X20   PTRx Ther Proc 4 Bridging With Theraband   PTRx Ther Proc 4 - Details X5 sec X20 (BTB)   PTRx Ther Proc 5 Clamshell Feet together   PTRx Ther Proc 5 - Details X20 each (BTB at knees)   PTRx Ther Proc 6 Sit to Stand   PTRx Ther Proc 6 - Details BTB: X20   PTRx Ther Proc 7 Side Stepping With Theraband   PTRx Ther Proc 7 - Details X10 each GTB X 3 sets   Skilled Intervention instructed on exercises to improve lumbar ROM and pain   Therapeutic Activity   PTRx Ther Act 1 Posture Correction with Lumbar Roll   PTRx Ther Act 1 - Details No Notes   Education   Learner/Method Patient;Demonstration;Pictures/Video;No Barriers to Learning   Plan   Home program PTrx         [facility]                                                                                   OUTPATIENT PHYSICAL THERAPY    PLAN OF TREATMENT FOR OUTPATIENT REHABILITATION   Patient's Last Name, First Name, Siddharth Dent YOB: 1990   Provider's Name   [facility]   Medical Record No.  [id number]     Onset Date: 10/10/23  Start of Care Date: 04/30/24     Medical Diagnosis:  Low back pain      PT Treatment Diagnosis:  Low back pain with strength and mobility deficits Plan of Treatment  Frequency/Duration: 1x/week/ 12 weeks    Certification date from 07/29/24 to 10/21/24         See note for plan of treatment details and functional goals     Maty Torres, PT                          I CERTIFY THE NEED FOR THESE SERVICES FURNISHED UNDER        THIS PLAN OF TREATMENT AND WHILE UNDER MY CARE     (Physician attestation of this document indicates review and certification of the therapy plan).              Referring Provider:  Kisha Marrero    Initial Assessment  See Epic Evaluation- Start of Care Date: 04/30/24

## 2024-08-20 ENCOUNTER — THERAPY VISIT (OUTPATIENT)
Dept: PHYSICAL THERAPY | Facility: CLINIC | Age: 34
End: 2024-08-20
Payer: COMMERCIAL

## 2024-08-20 DIAGNOSIS — M54.50 ACUTE BILATERAL LOW BACK PAIN WITHOUT SCIATICA: Primary | ICD-10-CM

## 2024-08-20 DIAGNOSIS — M54.6 ACUTE BILATERAL THORACIC BACK PAIN: ICD-10-CM

## 2024-08-20 PROCEDURE — 97110 THERAPEUTIC EXERCISES: CPT | Mod: GP | Performed by: PHYSICAL THERAPIST

## 2024-09-12 ENCOUNTER — THERAPY VISIT (OUTPATIENT)
Dept: PHYSICAL THERAPY | Facility: CLINIC | Age: 34
End: 2024-09-12
Payer: COMMERCIAL

## 2024-09-12 DIAGNOSIS — M54.50 ACUTE BILATERAL LOW BACK PAIN WITHOUT SCIATICA: Primary | ICD-10-CM

## 2024-09-12 DIAGNOSIS — M54.6 ACUTE BILATERAL THORACIC BACK PAIN: ICD-10-CM

## 2024-09-12 PROCEDURE — 97110 THERAPEUTIC EXERCISES: CPT | Mod: GP | Performed by: PHYSICAL THERAPIST

## 2024-10-04 ENCOUNTER — THERAPY VISIT (OUTPATIENT)
Dept: PHYSICAL THERAPY | Facility: CLINIC | Age: 34
End: 2024-10-04
Payer: COMMERCIAL

## 2024-10-04 DIAGNOSIS — M54.6 ACUTE BILATERAL THORACIC BACK PAIN: ICD-10-CM

## 2024-10-04 DIAGNOSIS — M54.50 ACUTE BILATERAL LOW BACK PAIN WITHOUT SCIATICA: Primary | ICD-10-CM

## 2024-10-04 PROCEDURE — 97110 THERAPEUTIC EXERCISES: CPT | Mod: GP | Performed by: PHYSICAL THERAPIST

## 2024-10-08 ENCOUNTER — HOSPITAL ENCOUNTER (EMERGENCY)
Facility: CLINIC | Age: 34
Discharge: HOME | End: 2024-10-08
Attending: STUDENT IN AN ORGANIZED HEALTH CARE EDUCATION/TRAINING PROGRAM | Admitting: STUDENT IN AN ORGANIZED HEALTH CARE EDUCATION/TRAINING PROGRAM
Payer: COMMERCIAL

## 2024-10-08 VITALS
BODY MASS INDEX: 32.44 KG/M2 | OXYGEN SATURATION: 98 % | WEIGHT: 190 LBS | HEIGHT: 64 IN | TEMPERATURE: 99.9 F | HEART RATE: 69 BPM | RESPIRATION RATE: 16 BRPM | DIASTOLIC BLOOD PRESSURE: 70 MMHG | SYSTOLIC BLOOD PRESSURE: 123 MMHG

## 2024-10-08 DIAGNOSIS — R06.7 SNEEZING: ICD-10-CM

## 2024-10-08 DIAGNOSIS — M25.522 LEFT ELBOW PAIN: ICD-10-CM

## 2024-10-08 PROCEDURE — 87637 SARSCOV2&INF A&B&RSV AMP PRB: CPT | Performed by: STUDENT IN AN ORGANIZED HEALTH CARE EDUCATION/TRAINING PROGRAM

## 2024-10-08 PROCEDURE — 99283 EMERGENCY DEPT VISIT LOW MDM: CPT

## 2024-10-08 RX ORDER — FLUTICASONE PROPIONATE 50 MCG
1 SPRAY, SUSPENSION (ML) NASAL DAILY PRN
Qty: 9.9 ML | Refills: 0 | Status: SHIPPED | OUTPATIENT
Start: 2024-10-08

## 2024-10-08 ASSESSMENT — ACTIVITIES OF DAILY LIVING (ADL)
ADLS_ACUITY_SCORE: 35
ADLS_ACUITY_SCORE: 35

## 2024-10-08 NOTE — ED PROVIDER NOTES
"  Emergency Department Note      History of Present Illness     Chief Complaint   Arm Pain and Cough      HPI   Henry Bautista is a 34 year old female who presents to the ED for left arm pain and sneezing. The patient reports that her daughter was attending school with a child with confirmed whooping cough. For the past 4-5 days she has been experiencing a cough and other cold symptoms. Around the same time, the patient started sneezing frequently with associated congestion. Denies sore throat. She also complains of left lateral elbow pain that has persisted for the past 2-3 months. 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 Nothing makes the pain better. Denies redness or swelling of the area and falls or injuries.     Independent Historian   None    Review of External Notes   None    Past Medical History     Medical History and Problem List   No pertinent past medical problems after review of care everywhere.    Medications   Topamax  Miralax   Anucort  Isibloom     Physical Exam     Patient Vitals for the past 24 hrs:   BP Temp Pulse Resp SpO2 Height Weight   10/08/24 0843 123/70 99.9  F (37.7  C) 69 16 98 % 1.626 m (5' 4\") 86.2 kg (190 lb)     Physical Exam  General: Awake, alert, in no acute distress   HEENT: Atraumatic   EOM normal   External ears normal   Trachea midline  Erythematous enlarged nasal turbinates bilaterally posterior oropharynx clear    Neck: Supple, normal ROM  CV: Regular rate, regular rhythm   No lower extremity edema  2+ radial and DP pulses  PULM: Breath sounds normal bilaterally  No wheezes or rales  ABD: Soft, non-tender, non-distended  Normal bowel sounds   No rebound or guarding   MSK: No gross deformities  Tenderness over left lateral epicondyle, full range of motion left elbow.  No joint effusion or overlying erythema/warmth  NEURO: Alert, no focal deficits  Skin: Warm, dry and intact      Diagnostics     Lab Results   Labs Ordered and Resulted from Time of ED " Arrival to Time of ED Departure   INFLUENZA A/B, RSV, & SARS-COV2 PCR - Normal       Result Value    Influenza A PCR Negative      Influenza B PCR Negative      RSV PCR Negative      SARS CoV2 PCR Negative         Imaging   No orders to display       Independent Interpretation   None    ED Course      Medications Administered   Medications - No data to display    Procedures   Procedures     Discussion of Management   None    ED Course   ED Course as of 10/08/24 1550   Tue Oct 08, 2024   0903 I obtained history and performed physical exam as noted above.        Additional Documentation  None    Medical Decision Making / Diagnosis     CMS Diagnoses: None    MIPS       None    MDM   Henry Bautista is a 34 year old female who presents primarily with sneezing as well as left elbow pain.  Viral panel is negative.  Her daughter attends a school and was reported to have a whooping cough exposure but patient has no URI symptoms resolved.  Will treat her with Flonase.  Symptoms have not been present long enough to treat for sinusitis.  Her elbow exam is consistent with lateral epicondylitis.  No traumatic injuries to necessitate x-rays.  No joint effusion, erythema to suggest septic joint.  Discussed supportive cares, work restrictions, TCO follow-up if not improving.     Disposition   The patient was discharged.     Diagnosis     ICD-10-CM    1. Sneezing  R06.7       2. Left elbow pain  M25.522            Discharge Medications   Discharge Medication List as of 10/8/2024 10:22 AM        START taking these medications    Details   fluticasone (FLONASE) 50 MCG/ACT nasal spray Spray 1 spray into both nostrils daily as needed for rhinitis or allergies., Disp-9.9 mL, R-0, Local Print               Scribe Disclosure:  I, Florecita Garner, am serving as a scribe at 9:24 AM on 10/8/2024 to document services personally performed by Alyx Erwin DO based on my observations and the provider's statements to me.        Melanie Ruiz  DO Alyx  10/08/24 1557

## 2024-10-08 NOTE — ED TRIAGE NOTES
Left elbow pain for 2x months, and daughter exposed at school to Whooping cough, pt reports URI symptoms.      Triage Assessment (Adult)       Row Name 10/08/24 0813          Triage Assessment    Airway WDL WDL        Cognitive/Neuro/Behavioral WDL    Cognitive/Neuro/Behavioral WDL WDL

## 2024-10-08 NOTE — Clinical Note
Henry Bautista was seen and treated in our emergency department on 10/8/2024.  She may return to work on 10/09/2024.  Please allow patient to return to work without pushing anything more than 20 pounds for the next 1 week.     If you have any questions or concerns, please don't hesitate to call.      Alyx Erwin, DO

## 2024-10-15 ENCOUNTER — THERAPY VISIT (OUTPATIENT)
Dept: PHYSICAL THERAPY | Facility: CLINIC | Age: 34
End: 2024-10-15
Payer: COMMERCIAL

## 2024-10-15 DIAGNOSIS — M54.50 ACUTE BILATERAL LOW BACK PAIN WITHOUT SCIATICA: Primary | ICD-10-CM

## 2024-10-15 DIAGNOSIS — M54.6 ACUTE BILATERAL THORACIC BACK PAIN: ICD-10-CM

## 2024-10-15 PROCEDURE — 97110 THERAPEUTIC EXERCISES: CPT | Mod: GP | Performed by: PHYSICAL THERAPIST

## 2024-10-22 ENCOUNTER — THERAPY VISIT (OUTPATIENT)
Dept: PHYSICAL THERAPY | Facility: CLINIC | Age: 34
End: 2024-10-22
Payer: COMMERCIAL

## 2024-10-22 DIAGNOSIS — M54.50 ACUTE BILATERAL LOW BACK PAIN WITHOUT SCIATICA: Primary | ICD-10-CM

## 2024-10-22 DIAGNOSIS — M54.6 ACUTE BILATERAL THORACIC BACK PAIN: ICD-10-CM

## 2024-10-22 PROCEDURE — 97110 THERAPEUTIC EXERCISES: CPT | Mod: GP | Performed by: PHYSICAL THERAPIST

## 2024-11-04 NOTE — PROGRESS NOTES
"Siddharth is a 34 year old who is being evaluated via a billable video visit.      The patient has been notified of following:     \"This video visit will be conducted via a call between you and your physician/provider. We have found that certain health care needs can be provided without the need for an in-person physical exam.  This service lets us provide the care you need with a video conversation.  If a prescription is necessary we can send it directly to your pharmacy.  If lab work is needed we can place an order for that and you can then stop by our lab to have the test done at a later time.    Video visits are billed at different rates depending on your insurance coverage.  Please reach out to your insurance provider with any questions.    If during the course of the call the physician/provider feels a video visit is not appropriate, you will not be charged for this service.\"    Patient has given verbal consent for Video visit? {YES-NO  Default Yes:4444::\"Yes\"}    How would you like to obtain your AVS? {AVS Preference:934366}    If the video visit is dropped, the invitation should be resent by: {video visit invitation:803851}    Will anyone else be joining your video visit? {:717985}    I{If patient encounters technical issues they should call [phone] :507314}    Video-Visit Details    Type of service:  Video Visit    Originating Location (pt. Location): Home    Distant Location (provider location):   Off-Site - Provider Home Office    Platform used for Video Visit: [employer]    Video Start Time: {video visit start/end time for provider to select:809574}    Video End Time:{video visit start/end time for provider to select:513047}  Return Bariatric Surgery Note    RE: Siddharth Fontanez  MR#: [id number]  : 1990  VISIT DATE: 2024    Dear Clinic, [facility] [place],    I had the pleasure of seeing your patient, Siddharth Fontanez, in my post-bariatric surgery assessment clinic.    Assessment & Plan   Problem List " Items Addressed This Visit    None       PATIENT INSTRUCTIONS:    *** minutes spent on the date of the encounter doing chart review, history and exam, result review, counseling, developing plan of care, documentation, and further activities as noted        CHIEF COMPLAINT: Post-bariatric surgery follow-up    HISTORY OF PRESENT ILLNESS:      4/18/2024    10:38 AM   Questions Regarding Prior Weight Loss Surgery Reviewed With Patient   I had the following weight loss procedure Amairani-en-y Gastric Bypass   What year was your surgery? 2023   How has your weight changed since your last visit? I have lost weight   Do you currently have any of the following None of the above   Do you have any concerns today? No   Last seen 4/19/2024.    Referral to PT placed for back pain.  Having problems with constipation/hemorrhoid. Anusol cream ordered. Miralax ordered.  They will contact you, otherwise call [phone] to schedule.      Weight History:      4/18/2024    10:38 AM   --   What is your highest lifetime weight? 320   What is your lowest weight since surgery? (In pounds) 200                      Patient is taking the following bariatric postoperative vitamins:  2 Complete multivitamins with minerals (at different times than calcium)  5000 Int Units of Vitamin D daily   500 mg of calcium  mg of calcium BID  500 mcg of Vitamin B12 sl daily  1000 mcg Vitamin B12 injection monthly  B complex daily   Thiamine weekly  1 Iron/Vit C. daily        4/18/2024    10:38 AM   Questions Regarding Co-Morbidities and Health Concerns Reviewed With Patient   Pre-diabetes Never   Diabetes II Never   High Blood Pressure Never   High cholesterol Never   Heartburn/Reflux Improved   Sleep apnea Never   PCOS Never   Back pain Stayed the same   Joint pain Stayed the same   Lower leg swelling Never           4/18/2024    10:38 AM   Eating Habits   How many meals do you eat per day? 3   Do you snack between meals? Sometimes   How much food are  you eating at each meal? 1/2 cup to 1 cup   Are you able to separate your meals and liquids by at least 30 minutes? Yes   Are you able to avoid liquid calories? Yes           4/18/2024    10:38 AM   Exercise Questions Reviewed With Patient   How often do you exercise? 3 to 4 times per week   What is the duration of your exercise (in minutes)? 30 Minutes   What types of exercise do you do? walking    home gym   What keeps you from being more active? I am as active as I can possbily be       Social History:      4/18/2024    10:38 AM   --   Are you smoking? No   Are you drinking alcohol? No       Medications:  Current Outpatient Medications   Medication Sig Dispense Refill    calcium carbonate-vitamin D (CALCIUM 600 + D) 600-10 MG-MCG per tablet Take 1 tablet by mouth 2 times daily Take one twice daily and at least 2 hours apart from iron 360 tablet 1    cholecalciferol 125 MCG (5000 UT) CAPS Take 1 capsule (5,000 Units) by mouth daily 90 capsule 3    cyanocobalamin (VITAMIN B-12) 1000 MCG tablet Take 1 tablet (1,000 mcg) by mouth daily 30 tablet 11    desogestrel-ethinyl estradiol (ISIBLOOM) 0.15-30 MG-MCG tablet Take 1 tablet by mouth daily 84 tablet 3    Elemental iron 65 mg Vitamin C 125 mg (VITRON-C)  MG TABS tablet TAKE 1 TABLET BY MOUTH DAILY 120 tablet 0    hydrocortisone (ANUSOL-HC) 25 MG suppository Place 1 suppository (25 mg) rectally 2 times daily 12 suppository 1    hydrocortisone, Perianal, (HYDROCORTISONE) 2.5 % cream Place rectally 2 times daily as needed for hemorrhoids 30 g 2    Multiple Vitamins-Iron (QC DAILY MULTIVITAMINS/IRON) TABS Take 2 tablets by mouth daily 180 tablet 3    polyethylene glycol (MIRALAX) 17 g packet Take 17 g by mouth daily 100 each 4    SENNA-docusate sodium (SENNA S) 8.6-50 MG tablet Take 2 tablets by mouth 2 times daily 120 tablet 0    thiamine (B-1) 100 MG tablet Take 1 tablet (100 mg) by mouth once a week 5 tablet 11    topiramate (TOPAMAX) 50 MG tablet Take 1 tablet  (50 mg) by mouth daily 90 tablet 1     No current facility-administered medications for this visit.         4/18/2024    10:38 AM   --   Do you avoid NSAIDs such as (Ibuprofen, Aleve, Naproxen, Advil)? Yes       ROS:  GI:       4/18/2024    10:38 AM   --   Vomiting No   Diarrhea No   Constipation Yes   Swallowing trouble No   Abdominal pain No   Heartburn No     Skin:       4/18/2024    10:38 AM   BAR RBS ROS - SKIN   Rash in skin folds No     Psych:       4/18/2024    10:38 AM   --   Depression No   Anxiety No     Female Only:       4/18/2024    10:38 AM   BAR RBS ROS -    Female only Birth control    Regular menstrual cycles   Stress urinary incontinence No       LABS/IMAGING/MEDICAL RECORDS REVIEW:   Reviewed    {DEXA:449563}    PHYSICAL EXAMINATION:  There were no vitals taken for this visit.  GENERAL: Healthy, alert and no distress  RESP: No audible wheeze, cough, or visible cyanosis.  No visible retractions or increased work of breathing.    SKIN: Visible skin clear. No significant rash, abnormal pigmentation or lesions.  PSYCH: Mentation appears normal, affect normal/bright, judgement and insight intact    COUNSELING PROVIDED:  We reviewed the important post op bariatric recommendations:  eating 3 meals daily  eating protein first, getting >60gm protein daily  eating slowly, chewing food well  avoiding/limiting calorie containing beverages  avoiding fluids 30 minutes before, during, and after meals  limiting restaurant or cafeteria eating to twice a week or less  Pt reminded to avoid marginal ulcers she should avoid tobacco at all, alcohol in excess, caffeine, and NSAIDS (unless indicated for cardioprotection or otherwise and opposed by a PPI).  Pt encouraged to establish and maintain a consistent physical activity routine, 6-8 hours of restorative sleep each night and optimal stress management.  Pt counseled on the importance of life long vitamin supplementation and life long follow up.

## 2024-11-05 ENCOUNTER — THERAPY VISIT (OUTPATIENT)
Dept: PHYSICAL THERAPY | Facility: CLINIC | Age: 34
End: 2024-11-05
Payer: COMMERCIAL

## 2024-11-05 DIAGNOSIS — M54.6 ACUTE BILATERAL THORACIC BACK PAIN: ICD-10-CM

## 2024-11-05 DIAGNOSIS — M54.50 ACUTE BILATERAL LOW BACK PAIN WITHOUT SCIATICA: Primary | ICD-10-CM

## 2024-11-05 PROCEDURE — 97110 THERAPEUTIC EXERCISES: CPT | Mod: GP | Performed by: PHYSICAL THERAPIST

## 2024-11-09 ENCOUNTER — HEALTH MAINTENANCE LETTER (OUTPATIENT)
Age: 34
End: 2024-11-09

## 2024-11-11 ENCOUNTER — LAB (OUTPATIENT)
Dept: LAB | Facility: CLINIC | Age: 34
End: 2024-11-11
Payer: COMMERCIAL

## 2024-11-11 ENCOUNTER — HOSPITAL ENCOUNTER (EMERGENCY)
Facility: CLINIC | Age: 34
Discharge: HOME | End: 2024-11-11
Admitting: EMERGENCY MEDICINE
Payer: COMMERCIAL

## 2024-11-11 ENCOUNTER — OFFICE VISIT (OUTPATIENT)
Dept: SURGERY | Facility: CLINIC | Age: 34
End: 2024-11-11
Payer: COMMERCIAL

## 2024-11-11 VITALS
BODY MASS INDEX: 35.59 KG/M2 | WEIGHT: 213.63 LBS | SYSTOLIC BLOOD PRESSURE: 97 MMHG | HEART RATE: 83 BPM | OXYGEN SATURATION: 100 % | HEIGHT: 65 IN | DIASTOLIC BLOOD PRESSURE: 86 MMHG | TEMPERATURE: 97 F | RESPIRATION RATE: 17 BRPM

## 2024-11-11 VITALS
OXYGEN SATURATION: 99 % | DIASTOLIC BLOOD PRESSURE: 70 MMHG | HEIGHT: 65 IN | HEART RATE: 69 BPM | WEIGHT: 210.8 LBS | BODY MASS INDEX: 35.12 KG/M2 | SYSTOLIC BLOOD PRESSURE: 102 MMHG

## 2024-11-11 DIAGNOSIS — Z98.84 BARIATRIC SURGERY STATUS: Primary | ICD-10-CM

## 2024-11-11 DIAGNOSIS — E66.01 CLASS 2 SEVERE OBESITY DUE TO EXCESS CALORIES WITH SERIOUS COMORBIDITY AND BODY MASS INDEX (BMI) OF 35.0 TO 35.9 IN ADULT (H): ICD-10-CM

## 2024-11-11 DIAGNOSIS — K59.01 SLOW TRANSIT CONSTIPATION: ICD-10-CM

## 2024-11-11 DIAGNOSIS — K91.2 POSTSURGICAL MALABSORPTION: ICD-10-CM

## 2024-11-11 DIAGNOSIS — E66.812 CLASS 2 SEVERE OBESITY DUE TO EXCESS CALORIES WITH SERIOUS COMORBIDITY AND BODY MASS INDEX (BMI) OF 35.0 TO 35.9 IN ADULT (H): ICD-10-CM

## 2024-11-11 PROBLEM — K59.03 DRUG INDUCED CONSTIPATION: Status: ACTIVE | Noted: 2024-11-11

## 2024-11-11 LAB
ALBUMIN SERPL BCG-MCNC: 4.1 G/DL (ref 3.5–5.2)
ALBUMIN UR-MCNC: NEGATIVE MG/DL
ALP SERPL-CCNC: 80 U/L (ref 40–150)
ALT SERPL W P-5'-P-CCNC: 13 U/L (ref 0–50)
ANION GAP SERPL CALCULATED.3IONS-SCNC: 14 MMOL/L (ref 7–15)
APPEARANCE UR: CLEAR
AST SERPL W P-5'-P-CCNC: 17 U/L (ref 0–45)
BILIRUB SERPL-MCNC: 0.2 MG/DL
BILIRUB UR QL STRIP: NEGATIVE
BUN SERPL-MCNC: 15.6 MG/DL (ref 6–20)
CALCIUM SERPL-MCNC: 8.6 MG/DL (ref 8.8–10.4)
CHLORIDE SERPL-SCNC: 102 MMOL/L (ref 98–107)
COLOR UR AUTO: YELLOW
CREAT SERPL-MCNC: 0.81 MG/DL (ref 0.51–0.95)
EGFRCR SERPLBLD CKD-EPI 2021: >90 ML/MIN/1.73M2
ERYTHROCYTE [DISTWIDTH] IN BLOOD BY AUTOMATED COUNT: 12.6 % (ref 10–15)
FERRITIN SERPL-MCNC: 65 NG/ML (ref 6–175)
GLUCOSE SERPL-MCNC: 90 MG/DL (ref 70–99)
GLUCOSE UR STRIP-MCNC: NEGATIVE MG/DL
HCG UR QL: NEGATIVE
HCO3 SERPL-SCNC: 21 MMOL/L (ref 22–29)
HCT VFR BLD AUTO: 39 % (ref 35–47)
HGB BLD-MCNC: 12.9 G/DL (ref 11.7–15.7)
HGB UR QL STRIP: NEGATIVE
HOLD SPECIMEN: NORMAL
IRON BINDING CAPACITY (ROCHE): 325 UG/DL (ref 240–430)
IRON SATN MFR SERPL: 23 % (ref 15–46)
IRON SERPL-MCNC: 76 UG/DL (ref 37–145)
KETONES UR STRIP-MCNC: 10 MG/DL
LEUKOCYTE ESTERASE UR QL STRIP: ABNORMAL
LIPASE SERPL-CCNC: 20 U/L (ref 13–60)
MCH RBC QN AUTO: 31.1 PG (ref 26.5–33)
MCHC RBC AUTO-ENTMCNC: 33.1 G/DL (ref 31.5–36.5)
MCV RBC AUTO: 94 FL (ref 78–100)
MUCOUS THREADS #/AREA URNS LPF: PRESENT /LPF
NITRATE UR QL: NEGATIVE
PH UR STRIP: 6 [PH] (ref 5–7)
PLATELET # BLD AUTO: 317 10E3/UL (ref 150–450)
POTASSIUM SERPL-SCNC: 4.2 MMOL/L (ref 3.4–5.3)
PROT SERPL-MCNC: 7.3 G/DL (ref 6.4–8.3)
PTH-INTACT SERPL-MCNC: 42 PG/ML (ref 15–65)
RBC # BLD AUTO: 4.15 10E6/UL (ref 3.8–5.2)
RBC URINE: 1 /HPF
SODIUM SERPL-SCNC: 137 MMOL/L (ref 135–145)
SP GR UR STRIP: 1.03 (ref 1–1.03)
SQUAMOUS EPITHELIAL: 2 /HPF
UROBILINOGEN UR STRIP-MCNC: NORMAL MG/DL
VIT B12 SERPL-MCNC: 287 PG/ML (ref 232–1245)
VIT D+METAB SERPL-MCNC: 57 NG/ML (ref 20–50)
WBC # BLD AUTO: 7.9 10E3/UL (ref 4–11)
WBC URINE: 3 /HPF

## 2024-11-11 PROCEDURE — 85027 COMPLETE CBC AUTOMATED: CPT

## 2024-11-11 PROCEDURE — 36415 COLL VENOUS BLD VENIPUNCTURE: CPT | Performed by: EMERGENCY MEDICINE

## 2024-11-11 PROCEDURE — 83690 ASSAY OF LIPASE: CPT | Performed by: EMERGENCY MEDICINE

## 2024-11-11 PROCEDURE — 81025 URINE PREGNANCY TEST: CPT | Performed by: EMERGENCY MEDICINE

## 2024-11-11 PROCEDURE — 83540 ASSAY OF IRON: CPT

## 2024-11-11 PROCEDURE — 99214 OFFICE O/P EST MOD 30 MIN: CPT | Performed by: PHYSICIAN ASSISTANT

## 2024-11-11 PROCEDURE — 83970 ASSAY OF PARATHORMONE: CPT

## 2024-11-11 PROCEDURE — 36415 COLL VENOUS BLD VENIPUNCTURE: CPT

## 2024-11-11 PROCEDURE — 99281 EMR DPT VST MAYX REQ PHY/QHP: CPT

## 2024-11-11 PROCEDURE — 84590 ASSAY OF VITAMIN A: CPT

## 2024-11-11 PROCEDURE — 81003 URINALYSIS AUTO W/O SCOPE: CPT | Performed by: EMERGENCY MEDICINE

## 2024-11-11 PROCEDURE — 83550 IRON BINDING TEST: CPT

## 2024-11-11 PROCEDURE — 82435 ASSAY OF BLOOD CHLORIDE: CPT | Performed by: EMERGENCY MEDICINE

## 2024-11-11 PROCEDURE — 82728 ASSAY OF FERRITIN: CPT

## 2024-11-11 PROCEDURE — 82607 VITAMIN B-12: CPT

## 2024-11-11 PROCEDURE — 82306 VITAMIN D 25 HYDROXY: CPT

## 2024-11-11 PROCEDURE — G2211 COMPLEX E/M VISIT ADD ON: HCPCS | Performed by: PHYSICIAN ASSISTANT

## 2024-11-11 RX ORDER — CALCIUM CARBONATE 300MG(750)
400 TABLET,CHEWABLE ORAL DAILY
Qty: 90 TABLET | Refills: 3 | Status: SHIPPED | OUTPATIENT
Start: 2024-11-11

## 2024-11-11 RX ORDER — LANOLIN ALCOHOL/MO/W.PET/CERES
1000 CREAM (GRAM) TOPICAL DAILY
Qty: 30 TABLET | Refills: 11 | Status: SHIPPED | OUTPATIENT
Start: 2024-11-11

## 2024-11-11 RX ORDER — POLYETHYLENE GLYCOL 3350 17 G/17G
17 POWDER, FOR SOLUTION ORAL DAILY
Qty: 510 G | Refills: 3 | Status: SHIPPED | OUTPATIENT
Start: 2024-11-11

## 2024-11-11 RX ORDER — BACILLUS COAGULANS 1B CELL
1 CAPSULE ORAL DAILY
Qty: 30 TABLET | Refills: 11 | Status: SHIPPED | OUTPATIENT
Start: 2024-11-11

## 2024-11-11 RX ORDER — CALCIUM CARBONATE/VITAMIN D3 600 MG-10
1 TABLET ORAL 2 TIMES DAILY
Qty: 60 TABLET | Refills: 11 | Status: SHIPPED | OUTPATIENT
Start: 2024-11-11

## 2024-11-11 RX ORDER — MENTHOL 5.8 MG/1
2 LOZENGE ORAL DAILY
Qty: 60 TABLET | Refills: 11 | Status: SHIPPED | OUTPATIENT
Start: 2024-11-11

## 2024-11-11 RX ORDER — LANOLIN ALCOHOL/MO/W.PET/CERES
100 CREAM (GRAM) TOPICAL WEEKLY
Qty: 5 TABLET | Refills: 11 | Status: SHIPPED | OUTPATIENT
Start: 2024-11-11

## 2024-11-11 ASSESSMENT — COLUMBIA-SUICIDE SEVERITY RATING SCALE - C-SSRS
1. IN THE PAST MONTH, HAVE YOU WISHED YOU WERE DEAD OR WISHED YOU COULD GO TO SLEEP AND NOT WAKE UP?: NO
2. HAVE YOU ACTUALLY HAD ANY THOUGHTS OF KILLING YOURSELF IN THE PAST MONTH?: NO
6. HAVE YOU EVER DONE ANYTHING, STARTED TO DO ANYTHING, OR PREPARED TO DO ANYTHING TO END YOUR LIFE?: NO

## 2024-11-11 ASSESSMENT — ACTIVITIES OF DAILY LIVING (ADL): ADLS_ACUITY_SCORE: 0

## 2024-11-11 NOTE — ASSESSMENT & PLAN NOTE
Discussed restarting MiraLAX and taking half dose daily to see if this is more tolerable.  If not can start magnesium 400 mg at night

## 2024-11-11 NOTE — PROGRESS NOTES
Return Bariatric Surgery Note    RE: Siddharth Fontanez  MR#: [id number]  : 1990  VISIT DATE: 2024    Dear Clinic, [facility] [place],    I had the pleasure of seeing your patient, Siddharth Fontanez, in my post-bariatric surgery assessment clinic.    Assessment & Plan   Problem List Items Addressed This Visit       Postsurgical malabsorption     Continue taking recommended post-op vitamins.  Labs ordered per protocol.           Relevant Medications    cyanocobalamin (VITAMIN B-12) 1000 MCG tablet    Multiple Vitamins-Iron (QC DAILY MULTIVITAMINS/IRON) TABS    thiamine (B-1) 100 MG tablet    Elemental iron 65 mg Vitamin C 125 mg (VITRON-C)  MG TABS tablet    Other Relevant Orders    CBC with platelets    Vitamin B12    Vitamin D Screen    Parathyroid Hormone Intact    Iron and Iron Binding Capacity    Ferritin    Vitamin A    Bariatric surgery status - Primary     10/10/2023 RYGBS LEL  Only concern today constipation         Slow transit constipation     Discussed restarting MiraLAX and taking half dose daily to see if this is more tolerable.  If not can start magnesium 400 mg at night         Relevant Medications    Magnesium 400 MG TABS    polyethylene glycol (MIRALAX) 17 GM/Dose powder    Class 2 severe obesity due to excess calories with serious comorbidity and body mass index (BMI) of 35.0 to 35.9 in adult (H)        PATIENT INSTRUCTIONS:  Labs ordered. Call [phone] to schedule.  Continue your bariatric vitamins     Try MiraLAX again but start with half of a packet daily if not tolerating can switch to magnesium 400 mg daily.     FOLLOW-UP:  Call [phone] to schedule in 6 month for constipation recheck and annually     31 minutes spent on the date of the encounter doing chart review, history and exam, result review, counseling, developing plan of care, documentation, and further activities as noted        CHIEF COMPLAINT: Post-bariatric surgery follow-up    HISTORY OF PRESENT ILLNESS:       11/11/2024     8:36 AM   Questions Regarding Prior Weight Loss Surgery Reviewed With Patient   I had the following weight loss procedure Sleeve Gastrectomy   What year was your surgery? 2023   How has your weight changed since your last visit? I have lost weight   Do you currently have any of the following None of the above   Do you have any concerns today? No     Last seen 4/19/2024.    Referral to PT placed for back pain.  Having problems with constipation/hemorrhoid. Anusol cream ordered. Miralax ordered.  They will contact you, otherwise call [phone] to schedule.    Weight History:      11/11/2024     8:36 AM   --   What is your highest lifetime weight? 330   What is your lowest weight since surgery? (In pounds) 214     Initial Weight (lbs): 316 lbs  Weight: 210 lb 12.8 oz (95.6 kg)   Cumulative weight loss (lbs): 105.2  Weight Loss Percentage: 33.29%    VITAMINS AND MINERALS:  Prescribed by provider  2 Multivitamin with Minerals-AM   600 mg Calcium With Vitamin D BID (4 pm/ bed time)  None for a month.  About 1 month.    1000 mcg Vitamin B-12 sublingual- month off  Vitron C AM  100 mg Thiamin one time per week off        11/11/2024     8:36 AM   Questions Regarding Co-Morbidities and Health Concerns Reviewed With Patient   Pre-diabetes Never   Diabetes II Never   High Blood Pressure Never   High cholesterol Never   Heartburn/Reflux Never   Sleep apnea Never   PCOS Never   Back pain Stayed the same   Joint pain Improved   Lower leg swelling Never           11/11/2024     8:36 AM   Eating Habits   How many meals do you eat per day? 3   Do you snack between meals? Sometimes   How much food are you eating at each meal? 1/2 cup to 1 cup   Are you able to separate your meals and liquids by at least 30 minutes? Yes   Are you able to avoid liquid calories? Yes           11/11/2024     8:36 AM   Exercise Questions Reviewed With Patient   How often do you exercise? 1 to 2 times per week   What is the duration of  your exercise (in minutes)? 60+ Minutes   What types of exercise do you do? walking    home gym    other   What keeps you from being more active? Lack of Time    Does PT for back pain once a week and does strength from this.     Social History:      11/11/2024     8:36 AM   --   Are you smoking? No   Are you drinking alcohol? No       Medications:  Current Outpatient Medications   Medication Sig Dispense Refill    calcium carbonate-vitamin D (CALCIUM 600 + D) 600-10 MG-MCG per tablet Take 1 tablet by mouth 2 times daily. Take one twice daily and at least 2 hours apart from iron 60 tablet 11    cholecalciferol 125 MCG (5000 UT) CAPS Take 1 capsule (5,000 Units) by mouth daily. 30 capsule 11    cyanocobalamin (VITAMIN B-12) 1000 MCG tablet Take 1 tablet (1,000 mcg) by mouth daily. 30 tablet 11    desogestrel-ethinyl estradiol (ISIBLOOM) 0.15-30 MG-MCG tablet Take 1 tablet by mouth daily 84 tablet 3    Elemental iron 65 mg Vitamin C 125 mg (VITRON-C)  MG TABS tablet Take 1 tablet by mouth daily. 30 tablet 11    hydrocortisone (ANUSOL-HC) 25 MG suppository Place 1 suppository (25 mg) rectally 2 times daily 12 suppository 1    hydrocortisone, Perianal, (HYDROCORTISONE) 2.5 % cream Place rectally 2 times daily as needed for hemorrhoids 30 g 2    Magnesium 400 MG TABS Take 400 mg by mouth daily. 90 tablet 3    Multiple Vitamins-Iron (QC DAILY MULTIVITAMINS/IRON) TABS Take 2 tablets by mouth daily. 60 tablet 11    polyethylene glycol (MIRALAX) 17 GM/Dose powder Take 17 g by mouth daily. 510 g 3    thiamine (B-1) 100 MG tablet Take 1 tablet (100 mg) by mouth once a week. 5 tablet 11     No current facility-administered medications for this visit.         11/11/2024     8:36 AM   --   Do you avoid NSAIDs such as (Ibuprofen, Aleve, Naproxen, Advil)? Yes       ROS:  GI:       11/11/2024     8:36 AM   --   Vomiting No   Diarrhea No   Constipation Yes, Has BM 2x a week.  Makes green smoothie- not that effective, hydrated,  "tried Miralax but got stomach cramps.     Swallowing trouble No   Abdominal pain No   Heartburn No     Skin:       11/11/2024     8:36 AM   BAR RBS ROS - SKIN   Rash in skin folds No     Psych:       11/11/2024     8:36 AM   --   Depression No   Anxiety No     Female Only:       11/11/2024     8:36 AM   BAR RBS ROS -    Female only Birth control    Regular menstrual cycles   Stress urinary incontinence No       LABS/IMAGING/MEDICAL RECORDS REVIEW:   Reviewed    PHYSICAL EXAMINATION:  /70   Pulse 69   Ht 5' 5\" (1.651 m)   Wt 210 lb 12.8 oz (95.6 kg)   SpO2 99%   BMI 35.08 kg/m    GENERAL: Healthy, alert and no distress  RESP: No audible wheeze, cough, or visible cyanosis.  No visible retractions or increased work of breathing.    SKIN: Visible skin clear. No significant rash, abnormal pigmentation or lesions.  PSYCH: Mentation appears normal, affect normal/bright, judgement and insight intact    COUNSELING PROVIDED:  We reviewed the important post op bariatric recommendations:  eating 3 meals daily  eating protein first, getting >60gm protein daily  eating slowly, chewing food well  avoiding/limiting calorie containing beverages  avoiding fluids 30 minutes before, during, and after meals  limiting restaurant or cafeteria eating to twice a week or less  Pt reminded to avoid marginal ulcers she should avoid tobacco at all, alcohol in excess, caffeine, and NSAIDS (unless indicated for cardioprotection or otherwise and opposed by a PPI).  Pt encouraged to establish and maintain a consistent physical activity routine, 6-8 hours of restorative sleep each night and optimal stress management.  Pt counseled on the importance of life long vitamin supplementation and life long follow up.      "

## 2024-11-11 NOTE — ED TRIAGE NOTES
Pt complains of generalized abd pain that started today. Pt endorses some constipation, last BM 9 days ago. Pt endorses passing gas. Pt has hx of gastric bypass surgery.     Of note pt had normal PCP appt today where she had labs drawn prior to her abd pain starting.

## 2024-11-11 NOTE — PATIENT INSTRUCTIONS
Nice to talk with you today.  Below is the plan discussed.-  . Kisha Marrero PA-C    Plan:  Labs ordered. Call [phone] to schedule.  Continue your bariatric vitamins     Try MiraLAX again but start with half of a packet daily if not tolerating can switch to magnesium 400 mg daily.     FOLLOW-UP:  Call [phone] to schedule in 6 month for constipation recheck and annually         Bariatric Post Op Guidelines  General:  Follow up annually lifelong.   Obesity is a chronic disease.  Weight gain can be expected. The goal of follow-up visits is to ensure adequate vitamin and protein absorption, evaluate food intake behavior, review exercise/activity level, and assist with weight regain.    To avoid marginal ulcers avoid all forms of tobacco, alcohol in excess, caffeine, and NSAIDS     Exercise is key for weight loss and weight maintenance. Aim for 30-60 minutes of physical activity most days.  Include cardiovascular and strength training.    Continue lifelong vitamins supplementation and annual lab follow up.  All patients should supplement with the following bariatric postoperative vitamins:  2 Complete multivitamins with minerals (at different times than calcium)  Vitamin D 5000 Int Units/125 mg daily   Calcium 600 mg twice daily or 500 mg three times daily   Vitamin B12: 500 mcg sl daily or 1000 mcg Inj monthly  B complex daily or Thiamine 100 mg weekly  1 Iron/Vit C. Daily for females who menstruate and/or as directed    Relay GI symptoms which can be a sign of complications. Inability to tolerate solid food (chicken, steak, fish) should by need to be evaluated.    There is a 10% increase of Alcohol Use Disorder in patients with bariatric surgery.   Most often occurring around 2 years post op.  Call if you feel alcohol is interfering in your daily life.  We can help.     Nutritional:  Eat 3 meals per day  (No snacks between meals.)  Do not skip meals.  This can cause overeating at the next meal and will  prevent adequate protein and nutritional intake.    Aim for 60-80 grams of protein per day.  Always eat your protein first. This assists with optimal nutrition and helps you stay full longer.    Eat your protein first, and then follow with fiber.    Add fiber by including fruits, vegetables, whole grains, and beans.     Portions should be 1 cup per meal.   Continue to use saucer/salad plates, infant/toddler silverware to keep portion sizes small and take small bites.  Make each meal last 20-30 minutes. Always stop eating when satisfied.    Aim for 64 oz. of calorie-free fluids daily.    Avoid drinking 30 min before, during, and 30 min after meal    Avoid high sugar and high fat foods to prevent high calorie intake.   Check nutrition labels for less than 10 grams of sugar and less than 10 grams of fat per serving.

## 2024-11-12 NOTE — PATIENT INSTRUCTIONS
Macario Messina-  Welcome to the [facility], [place]! It was great to visit with you and learn about  your progress. Below are the goals we discussed.  GOALS:  Drink 64 oz total fluids per day  Slowly increase fiber to 10-15 grams/day  Eat protein + 2 other foods groups  Thanks!  Oj Boswell RD, LD  [facility], [place]

## 2024-11-12 NOTE — PROGRESS NOTES
"Siddharth is a 34 year old who is being evaluated via a billable video visit.      The patient has been notified of following:     \"This video visit will be conducted via a call between you and your physician/provider. We have found that certain health care needs can be provided without the need for an in-person physical exam.  This service lets us provide the care you need with a video conversation.  If a prescription is necessary we can send it directly to your pharmacy.  If lab work is needed we can place an order for that and you can then stop by our lab to have the test done at a later time.    Video visits are billed at different rates depending on your insurance coverage.  Please reach out to your insurance provider with any questions.    If during the course of the call the physician/provider feels a video visit is not appropriate, you will not be charged for this service.\"    Patient has given verbal consent for Video visit? Yes    How would you like to obtain your AVS? MyChart    If the video visit is dropped, the invitation should be resent by: Text to cell phone: [phone]    Will anyone else be joining your video visit? No    I    Video-Visit Details    Type of service:  Video Visit    Originating Location (pt. Location): Home    Distant Location (provider location):   [facility]    Platform used for Video Visit: [facility]    Video Start Time: 11:38    Video End Time: 12:00       NUTRITION POST OP APPOINTMENT  DATE OF VISIT: November 12, 2024    Siddharth Fontanez  1990  female  [id number]  34 year old     ASSESSMENT:    REASON FOR VISIT:  Siddharth is a 34 year old year old female presents today for 1 year PO nutrition follow-up appointment. Patient is accompanied by self.    DIAGNOSIS:  Status post gastric bypass surgery.  Obesity Grade I BMI 30-34.9     ANTHROPOMETRICS:  Initial Weight: 316 lb   Height: 65\"    Current Weight: 213. 6 lb at 11/11/24 provider visit    BMI: " 35.55 kg/(m^2).    VITAMINS AND MINERALS:  Prescribed by weight loss provider   2 Multivitamin with Minerals-AM   600 mg Calcium With Vitamin D BID (4 pm/ bed time)  5000 International units Vitamin D  1000 mcg Vitamin B-12 sublingual  Vitron C AM  100 mg Thiamin one time per week  Gets periods      NUTRITION HISTORY:  Breakfast: protein drink- protein drink with 30 grams protein, banana, francoise, banana, collagen  Lunch: cubed chicken, cabbage salad, onion, spinach carrots, hard cooked egg, home made vinaigrette dressing  Supper: avocado toast, 1 egg or oatmeal+ cinnamon, stevia or protein drink, banana or grits with beef stew  Snacks: cucumber, dates or orange  Fluids consumed: 34-50 oz water, zero sugar orange juice made, protein drink 2-3X per week, Gatorade Zero 3X per week, no ETOH  Consuming liquid calories: Yes  Protein intake: 60-70 grams/day  Tolerate regular texture food: Yes  Any foods not tolerated details: No  If any food not tolerated: n/a  Portion size: 1 cup  Take 20-30 minutes to consume each meal: Yes   Eat protein foods first: No  Fluids and meals separate by at least 30 minutes: Yes  Chew foods thoroughly: Yes  Tolerating diet: Yes  Drinking high protein supplements: Yes  Consuming snacks per day: 1-2X per day  Additional Information: Patient reports  has random abdominal pain. Having 1 stool every 3-5 days. Overall patient is pleased with weight loss so far.        PHYSICAL ACTIVITY:  Type: walking  Frequency (days per week): 7  Duration (min): 2-5 K    PT 1X per week for back pain    DIAGNOSIS:  Previous Nutrition Diagnosis: Altered gastrointestinal function related to alteration in gastrointestinal structure as evidenced by history of gastric bypass surgery.- no change    Previous goals:  Eat 3 food groups per meal-improving  Eat solids at meals and aim for protein shakes between meals -not met       Current Nutrition Diagnosis: Altered gastrointestinal function related to alteration in  gastrointestinal structure as evidenced by history of gastric bypass surgery.    INTERVENTION:   Nutrition Prescription: Eat 3 meals a day at regular intervals. Consume 60-90 grams of protein daily. Follow post-surgical vitamin and mineral protocol.  Assessed learning needs and learning preferences. Discussed and will send High Fiber Nutrition Therapy (modified for weight loss surgery)    GOALS:  Drink 64 oz total fluids per day  Slowly increase fiber to 10-15 grams/day  Eat protein + 2 other foods groups    Implementation: Discussed progress toward previous goals; reinforced importance of following bariatric lifestyle changes.    NUTRITION MONITORING AND EVALUATION:  Anticipated compliance: fair-good  Verbalized fair-good understanding.    Follow up: Patient to follow up in 12 months.    TIME SPENT WITH PATIENT:  22 minutes  Oj Boswell RD, MARGUERITE  [facility]

## 2024-11-13 LAB
ANNOTATION COMMENT IMP: NORMAL
RETINYL PALMITATE SERPL-MCNC: <0.02 MG/L
VIT A SERPL-MCNC: 0.72 MG/L

## 2024-11-18 ENCOUNTER — THERAPY VISIT (OUTPATIENT)
Dept: PHYSICAL THERAPY | Facility: CLINIC | Age: 34
End: 2024-11-18
Payer: COMMERCIAL

## 2024-11-18 ENCOUNTER — VIRTUAL VISIT (OUTPATIENT)
Dept: SURGERY | Facility: CLINIC | Age: 34
End: 2024-11-18
Payer: COMMERCIAL

## 2024-11-18 DIAGNOSIS — K91.2 POSTSURGICAL MALABSORPTION: ICD-10-CM

## 2024-11-18 DIAGNOSIS — Z98.84 BARIATRIC SURGERY STATUS: Primary | ICD-10-CM

## 2024-11-18 DIAGNOSIS — M54.6 ACUTE BILATERAL THORACIC BACK PAIN: ICD-10-CM

## 2024-11-18 DIAGNOSIS — M54.50 ACUTE BILATERAL LOW BACK PAIN WITHOUT SCIATICA: Primary | ICD-10-CM

## 2024-11-18 DIAGNOSIS — E66.812 CLASS 2 SEVERE OBESITY DUE TO EXCESS CALORIES WITH SERIOUS COMORBIDITY AND BODY MASS INDEX (BMI) OF 35.0 TO 35.9 IN ADULT (H): ICD-10-CM

## 2024-11-18 DIAGNOSIS — E66.01 CLASS 2 SEVERE OBESITY DUE TO EXCESS CALORIES WITH SERIOUS COMORBIDITY AND BODY MASS INDEX (BMI) OF 35.0 TO 35.9 IN ADULT (H): ICD-10-CM

## 2024-11-18 PROCEDURE — 97110 THERAPEUTIC EXERCISES: CPT | Mod: GP

## 2024-11-18 PROCEDURE — 97803 MED NUTRITION INDIV SUBSEQ: CPT | Mod: 95

## 2024-11-18 PROCEDURE — 97112 NEUROMUSCULAR REEDUCATION: CPT | Mod: GP

## 2024-11-25 ENCOUNTER — THERAPY VISIT (OUTPATIENT)
Dept: PHYSICAL THERAPY | Facility: CLINIC | Age: 34
End: 2024-11-25
Payer: COMMERCIAL

## 2024-11-25 DIAGNOSIS — M54.50 ACUTE BILATERAL LOW BACK PAIN WITHOUT SCIATICA: Primary | ICD-10-CM

## 2024-11-25 DIAGNOSIS — M54.6 ACUTE BILATERAL THORACIC BACK PAIN: ICD-10-CM

## 2024-11-25 PROCEDURE — 97110 THERAPEUTIC EXERCISES: CPT | Mod: GP

## 2024-11-25 PROCEDURE — 97112 NEUROMUSCULAR REEDUCATION: CPT | Mod: GP

## 2024-12-21 ENCOUNTER — APPOINTMENT (OUTPATIENT)
Dept: CT IMAGING | Facility: CLINIC | Age: 34
End: 2024-12-21
Attending: EMERGENCY MEDICINE
Payer: COMMERCIAL

## 2024-12-21 ENCOUNTER — APPOINTMENT (OUTPATIENT)
Dept: ULTRASOUND IMAGING | Facility: CLINIC | Age: 34
End: 2024-12-21
Attending: EMERGENCY MEDICINE
Payer: COMMERCIAL

## 2024-12-21 PROCEDURE — 76856 US EXAM PELVIC COMPLETE: CPT

## 2024-12-21 PROCEDURE — 74177 CT ABD & PELVIS W/CONTRAST: CPT

## 2024-12-21 PROCEDURE — 76830 TRANSVAGINAL US NON-OB: CPT

## 2024-12-30 ENCOUNTER — THERAPY VISIT (OUTPATIENT)
Dept: PHYSICAL THERAPY | Facility: CLINIC | Age: 34
End: 2024-12-30
Payer: COMMERCIAL

## 2024-12-30 DIAGNOSIS — M54.50 ACUTE BILATERAL LOW BACK PAIN WITHOUT SCIATICA: Primary | ICD-10-CM

## 2024-12-30 DIAGNOSIS — M54.6 ACUTE BILATERAL THORACIC BACK PAIN: ICD-10-CM

## 2024-12-30 PROCEDURE — 97110 THERAPEUTIC EXERCISES: CPT | Mod: GP

## 2024-12-30 PROCEDURE — 97112 NEUROMUSCULAR REEDUCATION: CPT | Mod: GP

## 2025-01-04 ENCOUNTER — HOSPITAL ENCOUNTER (EMERGENCY)
Facility: CLINIC | Age: 35
Discharge: HOME | End: 2025-01-04
Attending: EMERGENCY MEDICINE | Admitting: EMERGENCY MEDICINE
Payer: COMMERCIAL

## 2025-01-04 ENCOUNTER — APPOINTMENT (OUTPATIENT)
Dept: GENERAL RADIOLOGY | Facility: CLINIC | Age: 35
End: 2025-01-04
Attending: EMERGENCY MEDICINE
Payer: COMMERCIAL

## 2025-01-04 VITALS
BODY MASS INDEX: 35.16 KG/M2 | HEIGHT: 65 IN | HEART RATE: 86 BPM | RESPIRATION RATE: 16 BRPM | DIASTOLIC BLOOD PRESSURE: 74 MMHG | SYSTOLIC BLOOD PRESSURE: 105 MMHG | OXYGEN SATURATION: 98 % | TEMPERATURE: 98.7 F | WEIGHT: 211 LBS

## 2025-01-04 DIAGNOSIS — J06.9 UPPER RESPIRATORY TRACT INFECTION, UNSPECIFIED TYPE: ICD-10-CM

## 2025-01-04 LAB
ALBUMIN SERPL BCG-MCNC: 3.6 G/DL (ref 3.5–5.2)
ALP SERPL-CCNC: 89 U/L (ref 40–150)
ALT SERPL W P-5'-P-CCNC: 8 U/L (ref 0–50)
ANION GAP SERPL CALCULATED.3IONS-SCNC: 13 MMOL/L (ref 7–15)
AST SERPL W P-5'-P-CCNC: 13 U/L (ref 0–45)
BASOPHILS # BLD AUTO: 0.1 10E3/UL (ref 0–0.2)
BASOPHILS NFR BLD AUTO: 0 %
BILIRUB SERPL-MCNC: 0.3 MG/DL
BUN SERPL-MCNC: 9.1 MG/DL (ref 6–20)
CALCIUM SERPL-MCNC: 8.8 MG/DL (ref 8.8–10.4)
CHLORIDE SERPL-SCNC: 105 MMOL/L (ref 98–107)
CREAT SERPL-MCNC: 0.8 MG/DL (ref 0.51–0.95)
EGFRCR SERPLBLD CKD-EPI 2021: >90 ML/MIN/1.73M2
EOSINOPHIL # BLD AUTO: 0.2 10E3/UL (ref 0–0.7)
EOSINOPHIL NFR BLD AUTO: 2 %
ERYTHROCYTE [DISTWIDTH] IN BLOOD BY AUTOMATED COUNT: 12.1 % (ref 10–15)
FLUAV RNA SPEC QL NAA+PROBE: NEGATIVE
FLUBV RNA RESP QL NAA+PROBE: NEGATIVE
GLUCOSE SERPL-MCNC: 104 MG/DL (ref 70–99)
HCG SERPL QL: NEGATIVE
HCO3 SERPL-SCNC: 24 MMOL/L (ref 22–29)
HCT VFR BLD AUTO: 42 % (ref 35–47)
HGB BLD-MCNC: 13.7 G/DL (ref 11.7–15.7)
IMM GRANULOCYTES # BLD: 0 10E3/UL
IMM GRANULOCYTES NFR BLD: 0 %
LYMPHOCYTES # BLD AUTO: 2 10E3/UL (ref 0.8–5.3)
LYMPHOCYTES NFR BLD AUTO: 18 %
MCH RBC QN AUTO: 30.6 PG (ref 26.5–33)
MCHC RBC AUTO-ENTMCNC: 32.6 G/DL (ref 31.5–36.5)
MCV RBC AUTO: 94 FL (ref 78–100)
MONOCYTES # BLD AUTO: 0.5 10E3/UL (ref 0–1.3)
MONOCYTES NFR BLD AUTO: 5 %
NEUTROPHILS # BLD AUTO: 8.7 10E3/UL (ref 1.6–8.3)
NEUTROPHILS NFR BLD AUTO: 76 %
NRBC # BLD AUTO: 0 10E3/UL
NRBC BLD AUTO-RTO: 0 /100
PLATELET # BLD AUTO: 298 10E3/UL (ref 150–450)
POTASSIUM SERPL-SCNC: 4.1 MMOL/L (ref 3.4–5.3)
PROT SERPL-MCNC: 7 G/DL (ref 6.4–8.3)
RBC # BLD AUTO: 4.48 10E6/UL (ref 3.8–5.2)
RSV RNA SPEC NAA+PROBE: NEGATIVE
S PYO DNA THROAT QL NAA+PROBE: NOT DETECTED
SARS-COV-2 RNA RESP QL NAA+PROBE: NEGATIVE
SODIUM SERPL-SCNC: 142 MMOL/L (ref 135–145)
WBC # BLD AUTO: 11.5 10E3/UL (ref 4–11)

## 2025-01-04 PROCEDURE — 82040 ASSAY OF SERUM ALBUMIN: CPT | Performed by: EMERGENCY MEDICINE

## 2025-01-04 PROCEDURE — 84703 CHORIONIC GONADOTROPIN ASSAY: CPT | Performed by: EMERGENCY MEDICINE

## 2025-01-04 PROCEDURE — 250N000013 HC RX MED GY IP 250 OP 250 PS 637: Performed by: EMERGENCY MEDICINE

## 2025-01-04 PROCEDURE — 85018 HEMOGLOBIN: CPT | Performed by: EMERGENCY MEDICINE

## 2025-01-04 PROCEDURE — 85025 COMPLETE CBC W/AUTO DIFF WBC: CPT | Performed by: EMERGENCY MEDICINE

## 2025-01-04 PROCEDURE — 84155 ASSAY OF PROTEIN SERUM: CPT | Performed by: EMERGENCY MEDICINE

## 2025-01-04 PROCEDURE — 36415 COLL VENOUS BLD VENIPUNCTURE: CPT | Performed by: EMERGENCY MEDICINE

## 2025-01-04 PROCEDURE — 87651 STREP A DNA AMP PROBE: CPT | Performed by: EMERGENCY MEDICINE

## 2025-01-04 PROCEDURE — 87637 SARSCOV2&INF A&B&RSV AMP PRB: CPT | Performed by: EMERGENCY MEDICINE

## 2025-01-04 PROCEDURE — 99284 EMERGENCY DEPT VISIT MOD MDM: CPT | Mod: 25 | Performed by: EMERGENCY MEDICINE

## 2025-01-04 PROCEDURE — 99284 EMERGENCY DEPT VISIT MOD MDM: CPT | Performed by: EMERGENCY MEDICINE

## 2025-01-04 PROCEDURE — 71046 X-RAY EXAM CHEST 2 VIEWS: CPT

## 2025-01-04 RX ORDER — ACETAMINOPHEN 500 MG
1000 TABLET ORAL ONCE
Status: COMPLETED | OUTPATIENT
Start: 2025-01-04 | End: 2025-01-04

## 2025-01-04 RX ADMIN — ACETAMINOPHEN 1000 MG: 500 TABLET, FILM COATED ORAL at 11:56

## 2025-01-04 ASSESSMENT — COLUMBIA-SUICIDE SEVERITY RATING SCALE - C-SSRS
6. HAVE YOU EVER DONE ANYTHING, STARTED TO DO ANYTHING, OR PREPARED TO DO ANYTHING TO END YOUR LIFE?: NO
1. IN THE PAST MONTH, HAVE YOU WISHED YOU WERE DEAD OR WISHED YOU COULD GO TO SLEEP AND NOT WAKE UP?: NO
2. HAVE YOU ACTUALLY HAD ANY THOUGHTS OF KILLING YOURSELF IN THE PAST MONTH?: NO

## 2025-01-04 ASSESSMENT — ACTIVITIES OF DAILY LIVING (ADL)
ADLS_ACUITY_SCORE: 52

## 2025-01-04 NOTE — CONSULTS
"Consult received for Vascular access care.  Per ED RN, patient doesn't need PIV from VAS, so cancelled consult. For additional needs place \"Nursing to Consult for Vascular Access\" [id number] order in EPIC.  "

## 2025-01-04 NOTE — ED TRIAGE NOTES
Pt is requesting a covid test     Triage Assessment (Adult)       Row Name 01/04/25 1120          Triage Assessment    Airway WDL WDL        Respiratory WDL    Respiratory WDL WDL;cough     Cough Frequency infrequent        Skin Circulation/Temperature WDL    Skin Circulation/Temperature WDL WDL        Cardiac WDL    Cardiac WDL WDL        Peripheral/Neurovascular WDL    Peripheral Neurovascular WDL WDL        Cognitive/Neuro/Behavioral WDL    Cognitive/Neuro/Behavioral WDL WDL

## 2025-01-04 NOTE — ED PROVIDER NOTES
[facility] EMERGENCY DEPARTMENT ([facility])    1/04/25      ED PROVIDER NOTE   History     Chief Complaint   Patient presents with    Cough     Pt reports SOB. She has had a cough x 2 days     HPI  Siddharth Fontanez is a 35 year old female who presents to the emergency department for shortness of breath and fever.  She reports multiple sick contacts at home.  Denies any fevers.  No vomiting diarrhea.  No abdominal pain.  She denies any chest pain but has had some muscle aches in her arms and legs.  She has had a mild sore throat without trouble swallowing, breathing or speaking.  No recent travel.  No rashes.  No leg pain or swelling.    Past Medical History  Past Medical History:   Diagnosis Date    Irregular periods     Migraine 08/10/2022    Papanicolaou smear of cervix with low grade squamous intraepithelial lesion (LGSIL) 08/25/2022     Past Surgical History:   Procedure Laterality Date    LAPAROSCOPIC BYPASS GASTRIC N/A 10/10/2023    Procedure: Laparoscopic Amairani-en-y Gastric Bypass;  Surgeon: Alvaro Zayas MD;  Location:  OR    TONSILLECTOMY  1995    approx    TONSILLECTOMY       calcium carbonate-vitamin D (CALCIUM 600 + D) 600-10 MG-MCG per tablet  cholecalciferol 125 MCG (5000 UT) CAPS  desogestrel-ethinyl estradiol (ISIBLOOM) 0.15-30 MG-MCG tablet  Elemental iron 65 mg Vitamin C 125 mg (VITRON-C)  MG TABS tablet  Magnesium 400 MG TABS  Multiple Vitamins-Iron (QC DAILY MULTIVITAMINS/IRON) TABS  polyethylene glycol (MIRALAX) 17 GM/Dose powder  thiamine (B-1) 100 MG tablet  cyanocobalamin (VITAMIN B-12) 1000 MCG tablet  hydrocortisone (ANUSOL-HC) 25 MG suppository  hydrocortisone, Perianal, (HYDROCORTISONE) 2.5 % cream      Allergies   Allergen Reactions    Contrast Dye Hives     Hives after Isovue-370    Sulfamethoxazole-Trimethoprim Hives    Aspirin Unknown     Gastric Bypass 10/10/23    Nsaids Other (See Comments)     Gastric Bypass 10/10/23     Family History  Family History   Problem  Relation Age of Onset    Obesity Mother     Hypertension Mother     Diabetes Mother     Obesity Father     Obesity Sister     Gestational Diabetes Sister      Social History   Social History     Tobacco Use    Smoking status: Former     Types: Hookah     Quit date: 2023     Years since quittin.5    Smokeless tobacco: Never   Vaping Use    Vaping status: Never Used   Substance Use Topics    Alcohol use: No    Drug use: Never     Comment: Hooka      Past medical history, past surgical history, medications, allergies, family history, and social history were reviewed with the patient. No additional pertinent items.   A medically appropriate review of systems was performed with pertinent positives and negatives noted in the HPI, and all other systems negative.    Physical Exam      Physical Exam  Physical Exam   Constitutional: oriented to person, place, and time. appears well-developed and well-nourished.   HENT:   Head: Normocephalic and atraumatic.  Uvula midline.  No submandibular swelling.  Tongue is not elevated.  No lymphadenopathy over the neck.  Neck: Normal range of motion.   Pulmonary/Chest: Effort normal. No respiratory distress.  Clear lungs without wheezing bilaterally.  Cardiac: No murmurs, rubs, gallops. RRR.  Abdominal: Abdomen soft, nontender, nondistended. No rebound tenderness.  MSK: Long bones without deformity or evidence of trauma.  No lower extremity edema.  Neurological: alert and oriented to person, place, and time.   Skin: Skin is warm and dry.   Psychiatric:  normal mood and affect.  behavior is normal. Thought content normal.       ED Course, Procedures, & Data      Procedures          Results for orders placed or performed during the hospital encounter of 25   XR Chest 2 Views     Status: None    Narrative    EXAM: XR CHEST 2 VIEWS  LOCATION: [facility]  DATE: 2025    INDICATION: cough, sob  COMPARISON: CT chest 2023. Chest  radiograph 10/20/2021      Impression    IMPRESSION: No focal consolidation, pleural effusion or pneumothorax. Cardiomediastinal silhouette is unremarkable.   Influenza A/B, RSV and SARS-CoV2 PCR (COVID-19) Nasopharyngeal     Status: Normal    Specimen: Nasopharyngeal; Swab   Result Value Ref Range    Influenza A PCR Negative Negative    Influenza B PCR Negative Negative    RSV PCR Negative Negative    SARS CoV2 PCR Negative Negative    Narrative    Testing was performed using the Xpert Xpress CoV2/Flu/RSV Assay on the [employer] GeneXpert Instrument. This test should be ordered for the detection of SARS-CoV2, influenza, and RSV viruses in individuals with signs and symptoms of respiratory tract infection. This test is for in vitro diagnostic use under the US FDA for laboratories certified under CLIA to perform high or moderate complexity testing. This test has been US FDA cleared. A negative result does not rule out the presence of PCR inhibitors in the specimen or target RNA in concentration below the limit of detection for the assay. If only one viral target is positive but coinfection with multiple targets is suspected, the sample should be re-tested with another FDA cleared, approved, or authorized test, if coninfection would change clinical management. This test was validated by the [facility] synd[facility]. These laboratories are certified under the Clinical Laboratory Improvement Amendments of 1988 (CLIA-88) as qualified to perfom high complexity laboratory testing.   HCG qualitative pregnancy (blood)     Status: Normal   Result Value Ref Range    hCG Serum Qualitative Negative Negative   CBC with platelets and differential     Status: Abnormal   Result Value Ref Range    WBC Count 11.5 (H) 4.0 - 11.0 10e3/uL    RBC Count 4.48 3.80 - 5.20 10e6/uL    Hemoglobin 13.7 11.7 - 15.7 g/dL    Hematocrit 42.0 35.0 - 47.0 %    MCV 94 78 - 100 fL    MCH 30.6 26.5 - 33.0 pg    MCHC 32.6 31.5 - 36.5 g/dL    RDW 12.1  10.0 - 15.0 %    Platelet Count 298 150 - 450 10e3/uL    % Neutrophils 76 %    % Lymphocytes 18 %    % Monocytes 5 %    % Eosinophils 2 %    % Basophils 0 %    % Immature Granulocytes 0 %    NRBCs per 100 WBC 0 <1 /100    Absolute Neutrophils 8.7 (H) 1.6 - 8.3 10e3/uL    Absolute Lymphocytes 2.0 0.8 - 5.3 10e3/uL    Absolute Monocytes 0.5 0.0 - 1.3 10e3/uL    Absolute Eosinophils 0.2 0.0 - 0.7 10e3/uL    Absolute Basophils 0.1 0.0 - 0.2 10e3/uL    Absolute Immature Granulocytes 0.0 <=0.4 10e3/uL    Absolute NRBCs 0.0 10e3/uL   Group A Streptococcus PCR Throat Swab     Status: Normal    Specimen: Throat; Swab   Result Value Ref Range    Group A strep by PCR Not Detected Not Detected    Narrative    The Xpert Xpress Strep A test, performed on the [employer], is a rapid, qualitative in vitro diagnostic test for the detection of Streptococcus pyogenes (Group A ß-hemolytic Streptococcus, Strep A) in throat swab specimens from patients with signs and symptoms of pharyngitis. The Xpert Xpress Strep A test can be used as an aid in the diagnosis of Group A Streptococcal pharyngitis. The assay is not intended to monitor treatment for Group A Streptococcus infections. The Xpert Xpress Strep A test utilizes an automated real-time polymerase chain reaction (PCR) to detect Streptococcus pyogenes DNA.   CBC with platelets differential     Status: Abnormal    Narrative    The following orders were created for panel order CBC with platelets differential.  Procedure                               Abnormality         Status                     ---------                               -----------         ------                     CBC with platelets and d...[[id number]]  Abnormal            Final result                 Please view results for these tests on the individual orders.     Medications - No data to display  Labs Ordered and Resulted from Time of ED Arrival to Time of ED Departure - No data to display  No  orders to display          Critical care was not performed.     Medical Decision Making  The patient's presentation was of moderate complexity (an acute illness with systemic symptoms).    The patient's evaluation involved:  strong consideration of a test (EKG, troponin, D-dimer) that was ultimately deferred  ordering and/or review of 3+ test(s) in this encounter (see separate area of note for details)    The patient's management necessitated only low risk treatment.    Assessment & Plan    Patient resenting with URI type symptoms.  She has no significant chest pain, cardiac pain, symptoms of a DVT concerning for pulmonary embolism.  Will defer CT and D-dimer.  Work appears largely benign.  Negative viral and influenza testing.  White blood count just above normal likely related to viral infection.  Chest x-ray without pneumonia or other infection.  She does have a cough here.  Discussed conservative measures such as cough suppressants, honey and follow-up with her primary care provider.  She did not want to wait for her metabolic panel to result.  Discussed that she should follow this up online    I have reviewed the nursing notes. I have reviewed the findings, diagnosis, plan and need for follow up with the patient.    New Prescriptions    No medications on file       Final diagnoses:   Upper respiratory tract infection, unspecified type       Edgar Paulino MD    [facility] EMERGENCY DEPARTMENT  1/4/2025     Edgar Paulino MD  01/04/25 1522

## 2025-01-04 NOTE — DISCHARGE INSTRUCTIONS
Return to the Emergency Department if you develop worsening symptoms, chest pain, shortness of breath, fevers or if you have any further concerns    If Siddharth has discomfort from fever or other pain, she can have:  Acetaminophen (Tylenol) every 6 hours as needed. Her dose is:    2 regular strength tabs (650 mg)                                     (43.2+ kg/96+ lb)    NOTE: If your acetaminophen (Tylenol) came with a dropper marked with 0.4 and 0.8 ml, call us ([phone]) or check with your doctor about the dose before using it.     AND/OR      Ibuprofen (Advil, Motrin) every 6 hours as needed. His/her dose is:    2 regular strength tabs (400 mg)                                                                         (40-60 kg/ lb)

## 2025-01-13 ENCOUNTER — THERAPY VISIT (OUTPATIENT)
Dept: PHYSICAL THERAPY | Facility: CLINIC | Age: 35
End: 2025-01-13
Payer: COMMERCIAL

## 2025-01-13 DIAGNOSIS — M54.50 ACUTE BILATERAL LOW BACK PAIN WITHOUT SCIATICA: Primary | ICD-10-CM

## 2025-01-13 DIAGNOSIS — M54.6 ACUTE BILATERAL THORACIC BACK PAIN: ICD-10-CM

## 2025-01-13 PROCEDURE — 97110 THERAPEUTIC EXERCISES: CPT | Mod: GP

## 2025-01-13 PROCEDURE — 97112 NEUROMUSCULAR REEDUCATION: CPT | Mod: GP

## 2025-01-13 NOTE — PROGRESS NOTES
"  Patient notes good improvement of symptoms from physical therapy. Patient has improved lumbar ROM, and reduced pain response. She also notes a decrease in symptom frequency. Patient mentioned how helpful the exercises can be to reduce to muscle tightness/tension she feels. At this time it would be best for this patient to independently complete her HEP to continue managing symptoms on her own. Patient will be discharged from physical therapy today.        01/13/25 0500   Appointment Info   Signing clinician's name / credentials Hector Omalley, PT DPT   Total/Authorized Visits [employer]   Visits Used 21   Medical Diagnosis Low back pain   PT Tx Diagnosis Low back pain with strength and mobility deficits   Other pertinent information name- \"I- on\"   Progress Note/Certification   Start of Care Date 04/30/24   Onset of illness/injury or Date of Surgery 10/10/23   Therapy Frequency 1x/week   Predicted Duration 12 weeks   Certification date from 10/21/24   Certification date to 01/13/25   Progress Note Due Date 01/13/24   Progress Note Completed Date 10/22/24   PT Goal 1   Goal Identifier exercise   Goal Description Pt will return to traditional exercise routine with 2/10 pain or less in back   Rationale to maximize safety and independence with performance of ADLs and functional tasks   Goal Progress Pain not limiting participation    Target Date 01/13/25   Subjective Report   Subjective Report Patient states having a long weekend of work. She notes that the back is overall improved but will still get occasional moments of tightness and tension - but says the exercises help to reduce those.   Objective Measures   Objective Measures Objective Measure 1;Objective Measure 2   Objective Measure 1   Objective Measure AROM lumbar   Details flexion= no loss (no pain); extension- min limit (no pain); SG R=nil (no pain)  SG L=nil (no pain)   Treatment Interventions (PT)   Interventions Therapeutic Procedure/Exercise;Neuromuscular " Re-education   Therapeutic Procedure/Exercise   Therapeutic Procedures: strength, endurance, ROM, flexibility minutes (38257) 18   Therapeutic Procedures Ther Proc 2;Ther Proc 3;Ther Proc 4;Ther Proc 5;Ther Proc 6   Ther Proc 1 posture -   Ther Proc 1 - Details being aware when sitting; trying not to slouch   Ther Proc 2 Nustep seat 9; level 6   Ther Proc 2 - Details x8 min   Ther Proc 5 Pt education   Ther Proc 5 - Details walking program (goal of 5K/day steps)   Ther Proc 6 Leg press: seat 5   Ther Proc 6 - Details NT   PTRx Ther Proc 1 Hip Flexion Straight Leg Raise   PTRx Ther Proc 1 - Details 2x10 repetitions   PTRx Ther Proc 2 Hip Abduction Straight Leg Raise   PTRx Ther Proc 2 - Details 2x10 repetitions - each side - cued for hip alignment   PTRx Ther Proc 3 Cervical Retraction with Overpressure   PTRx Ther Proc 3 - Details NT   PTRx Ther Proc 4 Flexion in Sitting with Patient Overpressure/Progression to Knee Extension   PTRx Ther Proc 4 - Details Verbally reviewed   PTRx Ther Proc 5 Standing Extension at Counter Supported   PTRx Ther Proc 5 - Details 2x10 repetitions   PTRx Ther Proc 6 Side Stepping With Theraband   PTRx Ther Proc 6 - Details Verbally reviewed   PTRx Ther Proc 7 Shoulder Theraband Rows   PTRx Ther Proc 7 - Details NT   PTRx Ther Proc 8 Shoulder Theraband Low Row/Pulldown   PTRx Ther Proc 8 - Details NT   PTRx Ther Proc 9 Bridging #4 Bent leg   PTRx Ther Proc 9 - Details Verbally reviewed   PTRx Ther Proc 10 Paloff Press - Rotation Variation   PTRx Ther Proc 10 - Details Verbally reviewed   PTRx Ther Proc 11 Seated Ball Exercise: Ant/Post Tilts   PTRx Ther Proc 11 - Details NT   PTRx Ther Proc 12 Paloff Press - Rotation Variation   PTRx Ther Proc 12 - Details 2x10 repetitions - both directions - BTB. Cued to maintain TA activation   PTRx Ther Proc 13 Seated Ball Exercise: Ant/Post Tilts   PTRx Ther Proc 13 - Details NT   Skilled Intervention instructed on exercises to improve lumbar ROM and  pain   Neuromuscular Re-education   Neuromuscular re-ed of mvmt, balance, coord, kinesthetic sense, posture, proprioception minutes (60313) 10   Neuro Re-ed 1 Supine Abdominal Exercise #6 (Dead Bug)   Neuro Re-ed 1 - Details 1x10/side   PTRx Neuro Re-ed 1 Supine Abdominal Exercise #8 (Toe Taps)   PTRx Neuro Re-ed 1 - Details Verbally reviewed   PTRx Neuro Re-ed 2 Pallof Press   PTRx Neuro Re-ed 2 - Details Verbally reviewed   Skilled Intervention Core strengthening - see above   Patient Response/Progress Pt tolerated well without increase in pain   Education   Learner/Method Patient;Demonstration;Pictures/Video;No Barriers to Learning   Plan   Home program PTrx - phone   Plan for next session Progress core/proximal hip strengthening as able - regular squat, lunges?   Total Session Time   Timed Code Treatment Minutes 28   Total Treatment Time (sum of timed and untimed services) 28     DISCHARGE  Reason for Discharge: No further expectation of progress. Patient has made great improvement in therapy, but it is best for her to discharge from therapy to independently complete HEP    Equipment Issued: Therapy bands    Discharge Plan: Patient to continue home program.    Referring Provider:  Kisha Marrero

## 2025-03-26 ENCOUNTER — TELEPHONE (OUTPATIENT)
Dept: FAMILY MEDICINE | Facility: CLINIC | Age: 35
End: 2025-03-26
Payer: COMMERCIAL

## 2025-07-01 ENCOUNTER — OFFICE VISIT (OUTPATIENT)
Dept: URGENT CARE | Facility: URGENT CARE | Age: 35
End: 2025-07-01
Payer: COMMERCIAL

## 2025-07-01 VITALS
SYSTOLIC BLOOD PRESSURE: 110 MMHG | DIASTOLIC BLOOD PRESSURE: 78 MMHG | HEART RATE: 71 BPM | WEIGHT: 232 LBS | TEMPERATURE: 98.6 F | BODY MASS INDEX: 38.65 KG/M2 | RESPIRATION RATE: 20 BRPM | HEIGHT: 65 IN | OXYGEN SATURATION: 98 %

## 2025-07-01 DIAGNOSIS — H60.331 ACUTE SWIMMER'S EAR OF RIGHT SIDE: Primary | ICD-10-CM

## 2025-07-01 PROCEDURE — 99213 OFFICE O/P EST LOW 20 MIN: CPT | Performed by: FAMILY MEDICINE

## 2025-07-01 RX ORDER — NEOMYCIN SULFATE, POLYMYXIN B SULFATE AND HYDROCORTISONE 10; 3.5; 1 MG/ML; MG/ML; [USP'U]/ML
3 SUSPENSION/ DROPS AURICULAR (OTIC) 4 TIMES DAILY
Qty: 10 ML | Refills: 0 | Status: SHIPPED | OUTPATIENT
Start: 2025-07-01 | End: 2025-07-11

## 2025-07-01 NOTE — PROGRESS NOTES
Urgent Care Clinic Visit    Chief Complaint   Patient presents with    Ear Problem     Pt states that she has on her R ear pain , itching ,some wet  in the ear , fever for x3days                7/1/2025    10:14 AM   Additional Questions   Roomed by BRADLY   Accompanied by NIALL

## 2025-07-01 NOTE — PROGRESS NOTES
"SUBJECTIVE:Henry Bautista is a 35 year old female who presents with right ear painfor day(s).   Severity: moderate   Timing:still present  Additional symptoms include none.    History of recurrent otitis: no    No past medical history on file.  No Known Allergies  Social History     Tobacco Use    Smoking status: Never     Passive exposure: Never    Smokeless tobacco: Not on file   Substance Use Topics    Alcohol use: Not Currently       ROS: CONSTITUTIONAL:NEGATIVE for fever, chills, change in weight    OBJECTIVE:  /78   Pulse 71   Temp 98.6  F (37  C) (Tympanic)   Resp 20   Ht 1.65 m (5' 4.96\")   Wt 105.2 kg (232 lb)   LMP 06/25/2025 (Approximate)   SpO2 98%   BMI 38.65 kg/m     The right TM is normal: no effusions, no erythema, and normal landmarks     The right auditory canal is erythematous, swollen, tender  The left TM is normal: no effusions, no erythema, and normal landmarks  The left auditory canal is normal and without drainage, edema or erythema  Oropharynx exam is normal: no lesions, erythema, adenopathy or exudate.GENERAL: no acute distress  EYES: EOMI,  PERRL, conjunctiva clear  SKIN: no suspicious lesions or rashes       ICD-10-CM    1. Acute swimmer's ear of right side  H60.331 neomycin-polymyxin-hydrocortisone (CORTISPORIN) 3.5-37946-0 otic suspension          F/U PCP/IM/FP/UC if worse, not any better    "

## (undated) DEVICE — BNDG ABDOMINAL BINDER 9X62-84" 79-89210

## (undated) DEVICE — GLOVE BIOGEL PI MICRO SZ 7.5 48575

## (undated) DEVICE — LINEN TOWEL PACK X5 5464

## (undated) DEVICE — ESU DISSECTOR SONICISION CVD JAW ULATRSONIC SCDA39

## (undated) DEVICE — GLOVE BIOGEL PI MICRO INDICATOR UNDERGLOVE SZ 7.5 48975

## (undated) DEVICE — SUCTION IRR STRYKERFLOW II W/TIP 250-070-520

## (undated) DEVICE — NDL SPINAL 20GA 3.5" 405182

## (undated) DEVICE — DEVICE SUTURE PASSER 14GA WECK EFX EFXSP2

## (undated) DEVICE — SU MONOCRYL 4-0 PS-2 18" UND Y496G

## (undated) DEVICE — CLIP APPLIER ENDO ROTATING 10MM MED/LG ER320

## (undated) DEVICE — ENDO TROCAR FIRST ENTRY KII FIOS Z-THRD 05X100MM CTF03

## (undated) DEVICE — ENDO TROCAR SLEEVE KII Z-THREADED 12X100MM CTS22

## (undated) DEVICE — ENDO TROCAR FIRST ENTRY KII FIOS Z-THRD 12X100MM CTF73

## (undated) DEVICE — SYR 03ML LL W/O NDL 309657

## (undated) DEVICE — DRAPE SHEET REV FOLD 3/4 9349

## (undated) DEVICE — EVAC SYSTEM CLEAR FLOW SC082500

## (undated) DEVICE — TUBING C02 INSUFFLATION LAP FILTER HEATER 6198

## (undated) DEVICE — STPL POWERED ECHELON 60MM PSEE60A

## (undated) DEVICE — DRSG GAUZE 4X4" 3033

## (undated) DEVICE — PREP CHLORAPREP 26ML TINTED HI-LITE ORANGE 930815

## (undated) DEVICE — DECANTER BAG 2002S

## (undated) DEVICE — SOL NACL 0.9% IRRIG 3000ML BAG 2B7477

## (undated) DEVICE — SOL NACL 0.9% IRRIG 1000ML BOTTLE 2F7124

## (undated) DEVICE — ESU HOLDER LAP INST DISP PURPLE LONG 330MM H-PRO-330

## (undated) DEVICE — SU WND CLOSURE VLOC 180 ABS 3-0 6" V-20 VLOCL0604

## (undated) DEVICE — PACK LAP CHOLE SLC15LCFSD

## (undated) DEVICE — ENDO SCOPE WARMER LF TM500

## (undated) DEVICE — STPL RELOAD REG TISSUE ECHELON 60 X 3.6MM BLUE GST60B

## (undated) DEVICE — ESU GROUND PAD UNIVERSAL W/O CORD

## (undated) DEVICE — SU VICRYL 0 TIE 12X18" J906G

## (undated) DEVICE — DRAPE BREAST/CHEST 29420

## (undated) DEVICE — SYR 30ML LL W/O NDL 302832

## (undated) DEVICE — SU VICRYL 3-0 SH 27" J316H

## (undated) DEVICE — DRAPE LEGGINGS 8421

## (undated) DEVICE — Device

## (undated) DEVICE — NDL 19GA 1.5"

## (undated) RX ORDER — ONDANSETRON 2 MG/ML
INJECTION INTRAMUSCULAR; INTRAVENOUS
Status: DISPENSED
Start: 2023-10-10

## (undated) RX ORDER — HYDROMORPHONE HCL IN WATER/PF 6 MG/30 ML
PATIENT CONTROLLED ANALGESIA SYRINGE INTRAVENOUS
Status: DISPENSED
Start: 2023-10-10

## (undated) RX ORDER — HEPARIN SODIUM 5000 [USP'U]/.5ML
INJECTION, SOLUTION INTRAVENOUS; SUBCUTANEOUS
Status: DISPENSED
Start: 2023-10-10

## (undated) RX ORDER — FAMOTIDINE 20 MG/1
TABLET, FILM COATED ORAL
Status: DISPENSED
Start: 2023-10-10

## (undated) RX ORDER — DIPHENHYDRAMINE HYDROCHLORIDE 50 MG/ML
INJECTION INTRAMUSCULAR; INTRAVENOUS
Status: DISPENSED
Start: 2023-10-10

## (undated) RX ORDER — BUPIVACAINE HYDROCHLORIDE 2.5 MG/ML
INJECTION, SOLUTION EPIDURAL; INFILTRATION; INTRACAUDAL
Status: DISPENSED
Start: 2023-10-10

## (undated) RX ORDER — HYDROMORPHONE HYDROCHLORIDE 1 MG/ML
INJECTION, SOLUTION INTRAMUSCULAR; INTRAVENOUS; SUBCUTANEOUS
Status: DISPENSED
Start: 2023-10-10

## (undated) RX ORDER — CEFAZOLIN SODIUM/WATER 2 G/20 ML
SYRINGE (ML) INTRAVENOUS
Status: DISPENSED
Start: 2023-10-10

## (undated) RX ORDER — ACETAMINOPHEN 325 MG/1
TABLET ORAL
Status: DISPENSED
Start: 2023-10-10

## (undated) RX ORDER — FENTANYL CITRATE 50 UG/ML
INJECTION, SOLUTION INTRAMUSCULAR; INTRAVENOUS
Status: DISPENSED
Start: 2023-10-10

## (undated) RX ORDER — FENTANYL CITRATE 0.05 MG/ML
INJECTION, SOLUTION INTRAMUSCULAR; INTRAVENOUS
Status: DISPENSED
Start: 2023-10-10